# Patient Record
Sex: FEMALE | Race: WHITE | NOT HISPANIC OR LATINO | Employment: FULL TIME | URBAN - METROPOLITAN AREA
[De-identification: names, ages, dates, MRNs, and addresses within clinical notes are randomized per-mention and may not be internally consistent; named-entity substitution may affect disease eponyms.]

---

## 2017-05-16 DIAGNOSIS — R42 DIZZINESS: Primary | ICD-10-CM

## 2017-05-24 ENCOUNTER — OFFICE VISIT (OUTPATIENT)
Dept: NEUROLOGY | Facility: CLINIC | Age: 47
End: 2017-05-24
Payer: COMMERCIAL

## 2017-05-24 VITALS
DIASTOLIC BLOOD PRESSURE: 83 MMHG | HEIGHT: 65 IN | BODY MASS INDEX: 20.64 KG/M2 | SYSTOLIC BLOOD PRESSURE: 122 MMHG | WEIGHT: 123.88 LBS | HEART RATE: 75 BPM

## 2017-05-24 DIAGNOSIS — F41.1 GENERALIZED ANXIETY DISORDER: ICD-10-CM

## 2017-05-24 PROCEDURE — 1160F RVW MEDS BY RX/DR IN RCRD: CPT | Mod: S$GLB,,, | Performed by: PSYCHIATRY & NEUROLOGY

## 2017-05-24 PROCEDURE — 99999 PR PBB SHADOW E&M-EST. PATIENT-LVL III: CPT | Mod: PBBFAC,,, | Performed by: PSYCHIATRY & NEUROLOGY

## 2017-05-24 PROCEDURE — 99204 OFFICE O/P NEW MOD 45 MIN: CPT | Mod: S$GLB,,, | Performed by: PSYCHIATRY & NEUROLOGY

## 2017-05-24 RX ORDER — ALPRAZOLAM 0.25 MG/1
.125-.25 TABLET ORAL 2 TIMES DAILY PRN
COMMUNITY
End: 2018-07-03

## 2017-05-24 NOTE — PROGRESS NOTES
Subjective:       Patient ID: Gaby Álvarez is a 47 y.o. female.    Chief Complaint:  Dizziness      History of Present Illness  HPI    This is a 47-year-old female who was referred for evaluation of dizziness and vertigo.  The patient was accompanied by her .  She reports that her symptoms her started on April 25, 2017 when she noted sudden onset of a spinning sensation when turning in the bed.  The first few days she had significant positional vertigo with associated gait imbalance.  Symptoms appear to be more prominent when turning to the left or looking up.  She had seen a primary care physician and subsequently was started on meclizine which she did not tolerate.  She denies any prior history of similar symptoms but did report that about a year ago she had transient episode of dizziness that resolved on its own.  She was seen at the [facility] earlier this month and had audiological studies done.  She was noted to have left positional vertigo with asymmetrical hearing loss and was advised vestibular exercises and further vestibular testing however this was scheduled for the end of July.  Hence her referral here.    The patient had an MRI scan of the brain done recently that showed some nonspecific changes but was otherwise normal.  It is to be noted that the patient has had a history of migraine headaches in the past usually occurring around her periods.  She is presently in early menopause reporting that last period was 3 months ago.  She has not had any recent headaches.  She denies any recent sinus problems or viral illnesses.  She denies any history of head trauma.  The vertigo is not associated with any other focal neurological symptoms.  She has a history of chronic anxiety and had been on Xanax which she tried taking more recently for the symptoms however even a quarter of a tablet made her drowsy.  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  Her  was  present today did [place] an appointment to see him at the end of this month for the vestibular testing.       Review of Systems  Review of Systems   Constitutional: Negative.    HENT: Negative for hearing loss.    Eyes: Negative.  Negative for visual disturbance.   Respiratory: Negative.  Negative for shortness of breath.    Cardiovascular: Negative.  Negative for chest pain and palpitations.   Gastrointestinal: Negative.    Genitourinary: Negative.    Musculoskeletal: Negative.  Negative for back pain, gait problem and neck pain.   Skin: Negative.    Neurological: Positive for dizziness and headaches. Negative for tremors, seizures, syncope, speech difficulty, weakness and numbness.   Psychiatric/Behavioral: Negative.  Negative for confusion and decreased concentration.       Objective:      Neurologic Exam     Mental Status   Oriented to person, place, and time.   Registration: recalls 3 of 3 objects. Follows 3 step commands.   Attention: normal. Concentration: normal.   Speech: speech is normal   Level of consciousness: alert  Knowledge: good.   Able to name object. Able to read. Able to repeat. Normal comprehension.     Cranial Nerves   Cranial nerves II through XII intact.     Motor Exam   Muscle bulk: normal  Overall muscle tone: normal    Strength   Strength 5/5 throughout.     Sensory Exam   Light touch normal.   Proprioception normal.   Pinprick normal.     Gait, Coordination, and Reflexes     Gait  Gait: normal    Coordination   Romberg: negative  Finger to nose coordination: normal    Tremor   Resting tremor: absent  Intention tremor: absent  Action tremor: absent    Reflexes   Right brachioradialis: 1+  Left brachioradialis: 1+  Right biceps: 1+  Left biceps: 1+  Right triceps: 1+  Left triceps: 1+  Right patellar: 1+  Left patellar: 1+  Right achilles: 1+  Left achilles: 1+  Right plantar: normal  Left plantar: normal      Physical Exam    Constitutional: She is oriented to person, place, and time. She appears well-developed and well-nourished.   HENT:   Head: Normocephalic and atraumatic.   Neck: Normal range of motion. Neck supple. Carotid bruit is not present.   Neurological: She is oriented to person, place, and time. She has normal strength. She has a normal Finger-Nose-Finger Test and a normal Romberg Test. Gait normal.   Reflex Scores:       Tricep reflexes are 1+ on the right side and 1+ on the left side.       Bicep reflexes are 1+ on the right side and 1+ on the left side.       Brachioradialis reflexes are 1+ on the right side and 1+ on the left side.       Patellar reflexes are 1+ on the right side and 1+ on the left side.       Achilles reflexes are 1+ on the right side and 1+ on the left side.  Psychiatric: Her speech is normal.   Vitals reviewed.        Assessment:        1. Positional vertigo, unspecified laterality    2. Generalized anxiety disorder            Plan:         Discussed with patient and .  Advised him to keep her appointment with ENT later this month for an evaluation and vestibular testing.  Instead of the Xanax she would be given a prescription for Valium 2 mg, half tablet to be used twice a day as needed for the vertiginous symptoms.  However she has been advised to hold taking the Valium a day or 2 before she has the ENT testing.  She is advised that she may follow-up with me after the ENT evaluation is completed and the recommendations of treatment are initiated.  The possibility that some of her symptoms may be related to vertiginous migraine is to be kept in mind.  Magnesium oxide 250 mg daily at bedtime is initiated for migraine prophylaxis to see if this might help some of her vertiginous symptoms.  They will contact me after the ENT evaluation for follow-up in [place].

## 2017-05-24 NOTE — PATIENT INSTRUCTIONS
Discussed with patient and .  Advised him to keep her appointment with ENT later this month for an evaluation and vestibular testing.  Instead of the Xanax she would be given a prescription for Valium 2 mg, half tablet to be used twice a day as needed for the vertiginous symptoms.  However she has been advised to hold taking the Valium a day or 2 before she has the ENT testing.  She is advised that she may follow-up with me after the ENT evaluation is completed and the recommendations of treatment are initiated.  The possibility that some of her symptoms may be related to vertiginous migraine is to be kept in mind.  Magnesium oxide 250 mg daily at bedtime is initiated for migraine prophylaxis to see if this might help some of her vertiginous symptoms.  They will contact me after the ENT evaluation for follow-up in [place].

## 2017-05-25 RX ORDER — DIAZEPAM 2 MG/1
1-2 TABLET ORAL EVERY 12 HOURS PRN
Qty: 30 TABLET | Refills: 1 | Status: SHIPPED | OUTPATIENT
Start: 2017-05-25 | End: 2018-07-03

## 2017-07-05 DIAGNOSIS — Z12.31 SCREENING MAMMOGRAM, ENCOUNTER FOR: Primary | ICD-10-CM

## 2017-07-11 ENCOUNTER — HOSPITAL ENCOUNTER (OUTPATIENT)
Dept: RADIOLOGY | Facility: HOSPITAL | Age: 47
Discharge: HOME | End: 2017-07-11
Attending: OBSTETRICS & GYNECOLOGY
Payer: COMMERCIAL

## 2017-07-11 VITALS — HEIGHT: 65 IN | BODY MASS INDEX: 20.49 KG/M2 | WEIGHT: 123 LBS

## 2017-07-11 DIAGNOSIS — Z12.31 SCREENING MAMMOGRAM, ENCOUNTER FOR: ICD-10-CM

## 2017-07-11 PROCEDURE — 77067 SCR MAMMO BI INCL CAD: CPT | Mod: TC

## 2017-08-23 DIAGNOSIS — R42 DIZZINESS: Primary | ICD-10-CM

## 2018-07-03 ENCOUNTER — OFFICE VISIT (OUTPATIENT)
Dept: PSYCHIATRY | Facility: CLINIC | Age: 48
End: 2018-07-03
Payer: COMMERCIAL

## 2018-07-03 VITALS
HEIGHT: 65 IN | WEIGHT: 132.63 LBS | HEART RATE: 80 BPM | BODY MASS INDEX: 22.1 KG/M2 | DIASTOLIC BLOOD PRESSURE: 81 MMHG | SYSTOLIC BLOOD PRESSURE: 126 MMHG

## 2018-07-03 DIAGNOSIS — F43.23 ADJUSTMENT DISORDER WITH MIXED ANXIETY AND DEPRESSED MOOD: ICD-10-CM

## 2018-07-03 DIAGNOSIS — F41.1 GAD (GENERALIZED ANXIETY DISORDER): ICD-10-CM

## 2018-07-03 PROCEDURE — 90792 PSYCH DIAG EVAL W/MED SRVCS: CPT | Mod: S$GLB,,, | Performed by: PSYCHIATRY & NEUROLOGY

## 2018-07-03 PROCEDURE — 99999 PR PBB SHADOW E&M-EST. PATIENT-LVL III: CPT | Mod: PBBFAC,,, | Performed by: PSYCHIATRY & NEUROLOGY

## 2018-07-03 RX ORDER — BETAHISTINE HCL 100 %
POWDER (GRAM) MISCELLANEOUS
Refills: 5 | COMMUNITY
Start: 2018-06-19 | End: 2018-07-03

## 2018-07-03 RX ORDER — ESCITALOPRAM OXALATE 10 MG/1
TABLET ORAL
Qty: 1 TABLET | Refills: 0
Start: 2018-07-03 | End: 2018-07-20 | Stop reason: SDUPTHER

## 2018-07-03 RX ORDER — CLONAZEPAM 0.5 MG/1
0.25 TABLET ORAL NIGHTLY
COMMUNITY
Start: 2018-05-02 | End: 2018-09-04

## 2018-07-03 NOTE — PROGRESS NOTES
Outpatient Psychiatry Initial Visit (MD/NP)    7/3/2018    Gaby Álvarez, a 48 y.o. female, presenting for initial evaluation visit. Met with patient.    Reason for Encounter: self-referral. Patient complains of anxiety, depression.     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  I have not met patient before and do not feel this is a conflict of information.     Pt reports hx of a vestibular disorder, called superior canal dehiscence which is currently being evaluated.  She recently changed providers to a Neurotologist.in [place], Dr Zavaleta whom she saw today.  She is concerned because he does not file insurance and she will have to pay out of pocket for vestibular testing.  Pt reports symptoms of intense vertigo/dizziness which initially started 4/2017.  She reports symptoms were abrupt, unable to ambulate, was in bed for weeks.  She has been taking betahistine BID with some improvement, but has had some improvement in her balance, fullness in her ears.  She tried injections first but did not tolerate it and it caused high anxiety.  Pt reports her previous Neurologist Dr Rosa had recommended that she have a craniotomy which has caused her considerable stress.   Pt has been taking Clonazepam 0.25 mg nightly, but decided to stop it most of June and did not do well (very anxious, depressed). She is back to taking it 2-3 times a week. She did not experience any w/d symptoms.  Dr Zavaleta prescribed today Lexapro 10 mg - she believes to start taking 5 mg daily for unclear time duration (Rx in car, I tried to call patient to have her review it, but she could not understand it).     Pt reports this medical stressor has occurred along with problems with her 24 yo daughter (not getting along with her father), her son graduating from high school.   She  has taught x 23 years and she reports an incident occurred at school in the month of May which has caused her significant distress. She realized that during [employer] testing, there was something written on the wall that needed to be flipped over so the student would not have the information during the testing.   Pt did flip the info over within a few minutes, but was very bothered that she had done this so she admitted her wrong doing to her principal.  Pt reports she ended up being written up - the first time this has ever happened to her which has been very distressing for her.  Additionally, she reports there was a meeting at school with the student and other team members and she was so concerned they would talk about her, that she decided to record the conversation on her phone.  She realized later that nothing actually did record on her phone, but she has lived in intense fear that she may lose her job.  She also feels her coworkers are treating her differently, and she is upset that no one has asked her how she is doing.     Pt endorses the following:     MDE: depressed mood, anhedonia, hopelessness, feeling tired, worthlessness, + guilt.  poor focus. She has had decreased self care, excessive sleep.  Denies suicidal/homicidal ideations.  Appetite is ok.  PHQ-9: 12 (extremely difficult)  + isolative behavior. Pt is fearful about going back to school, especially team meeting in about 2 weeks.     IVONNE: Pt endorses feeling anxious, being unable to control anxiety, worrying excessively, trouble relaxing, irritability, restlessness. IVONNE-7: 12.  She denies symptoms of OCD    She denies hx of davina.  MDQ: 2 yes: irritability, easily distracted - minor problems.     Pt denies hx of violence, no HI.   She denies hx of auditory/visual hallucinations. Pt is paranoid that coworkers are plotting against her and talking about her. She does feel friends and coworkers are talking about her and are plotting together.    Past  Psychiatric History:  Prior diagnosis:  none  Inpatient psychiatric tx: none   Outpatient psychiatric tx:  None     Prior medications:   prescribed something x 2 days - Cymbalta or Wellbutrin - sick to stomach, did not take it PCP  Left work due to anxiety in  - mother ill. 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, congenital  Worst panic attack ever with DH Maneveur      Hx TBI: none   Hx seizures: none         Past Surgical History:  GALLBLADDER SURGERY[[id number]]       SECTION[[id number]                Family History:     Suicides:none   Substance abuse:  None   Bipolar Disorder:  None   Anxiety: father - anxieety   Depression: None       Social History:  Childhood: grew up in [place], 2 brother, middle child, 10 months older than younger brother, close to older brother, lost both parents 2010 mom, dad 11 months later   Marital status:  Children:   Resides: lives with  of 26 yrs, 22 yo daughter (), 18 yo - U, greatkkid, honor roll   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, Literacy   Hobbies: glittering shoes for [employer], used to exercise - vertigo limits this   Alevism: [place]   Education level:   : [employer] Bachelor's Science none   Legal:  None   Hx of abuse:  None       Substance History:  Tobacco: none   Alcohol: occasional, 1 glass wine occasional, not weekly  Drug use: none   Caffeine: decaf    Rehab:none   Prior/current AA: none       Review Of Systems:     GENERAL:  No weight gain or loss - lost 33 lbs  "a few yrs ago   SKIN:  No rashes or lacerations  HEAD:  No headaches  EYES:  No exophthalmos, jaundice or blindness  EARS:  occ dizziness, + tinnitus or hearing loss  NOSE:  No changes in smell  MOUTH & THROAT:  No dyskinetic movements or obvious goiter  CHEST:  No shortness of breath, hyperventilation or cough  CARDIOVASCULAR:  No tachycardia or chest pain  ABDOMEN:  No nausea, vomiting, pain, constipation or diarrhea, right sided abdominal pain RLQ  URINARY:  No frequency, dysuria or sexual dysfunction  ENDOCRINE:  No polydipsia, polyuria  MUSCULOSKELETAL:  No pain or stiffness of the joints  NEUROLOGIC:  No weakness, sensory changes, seizures,+ some confusion, + forgotful, forgot bill few months ago, memory loss, tremor or other abnormal movements    Current Evaluation:     Nutritional Screening: Considering the patient's height and weight, medications, medical history and preferences, should a referral be made to the dietitian? no    Constitutional  Vitals:  Most recent vital signs, dated less than 90 days prior to this appointment, were not reviewed (not available)  Vitals:    07/03/18 1445   BP: 126/81   Pulse: 80   Weight: 60.2 kg (132 lb 9.7 oz)   Height: 5' 5" (1.651 m)        General:  age appropriate, normal weight, well nourished, well dressed, neatly groomed     Musculoskeletal  Muscle Strength/Tone:  no tremor   Gait & Station:  non-ataxic     Psychiatric  Speech:  no latency; no press   Mood & Affect:  anxious  mood-congruent   Thought Process:  racing   Associations:  intact, circumstantial   Thought Content:  normal, no suicidality, no homicidality, delusions, or paranoia, hallucinations: (auditory: no, visual: no), paranoid   Insight:  fair   Judgement: fair    Orientation:  grossly intact, person, place, situation, time/date, day of week, month of year, year   Memory: intact for content of interview, memory >3 objects at five mins, able to remember recent events- no, able to remember remote " events- yes   Language: grossly intact   Attention Span & Concentration:  able to focus   Fund of Knowledge:  intact and appropriate to age and level of education, familiar with aspects of current personal life       Relevant Elements of Neurological Exam: normal gait    Functioning in Relationships:  Spouse/partner: supportive, encouraged her to get help  Peers: limited   Employers: + conflcits     Laboratory Data  No visits with results within 1 Month(s) from this visit.   Latest known visit with results is:   No results found for any previous visit.         Medications  Outpatient Encounter Prescriptions as of 7/3/2018   Medication Sig Dispense Refill    betahistine HCl (BETAHISTINE, BULK, MISC) 1 capsule by Misc.(Non-Drug; Combo Route) route 2 (two) times daily. Its a compounded drug made into capsule 12 mg      clonazePAM (KLONOPIN) 0.5 MG tablet Take 0.25 mg by mouth every evening. patietn only taking half pill trying to wean off      [DISCONTINUED] alprazolam (XANAX) 0.25 MG tablet Take 0.125-0.25 mg by mouth 2 (two) times daily as needed for Anxiety.      [DISCONTINUED] betahistine HCl (BETAHISTINE, BULK,) 100 % Powd   5    [DISCONTINUED] diazePAM (VALIUM) 2 MG tablet Take 0.5-1 tablets (1-2 mg total) by mouth every 12 (twelve) hours as needed for Anxiety (And dizziness). 30 tablet 1     No facility-administered encounter medications on file as of 7/3/2018.            Assessment - Diagnosis - Goals:     Impression: pt presents for intake following high anxiety/stress related to health and occupational stressors. Pt's symptoms are bordering on delusional.  Family has been very concerned and her sister in law reached out to schedule this appt for her.     Adjustment Disorder with mixed anxiety and depressed mood  Generalized Anxiety Disorder  Superior Canal Dehiscence     GAF: 52     Strengths and Liabilities: Strength: Patient accepts guidance/feedback, Strength: Patient is expressive/articulate.,  Strength: Patient is intelligent.    Treatment Goals:  Specify outcomes written in observable, behavioral terms:   Anxiety: acquiring relapse prevention skills, reducing negative automatic thoughts, reducing physical symptoms of anxiety and reducing time spent worrying (<30 minutes/day)  Depression: acquiring relapse prevention skills, increasing energy, increasing interest in usual activities and increasing motivation    Treatment Plan/Recommendations:   · Medication Management: The risks and benefits of medication were discussed with the patient.    - agree with trial of Lexapro 10 mg with instructions of: Take one-half tablet by mouth daily for 7 days, then increase to one tablet by mouth daily  - agree with continued use of Clonazepam 0.25 mg nightly PRN anxiety. Discussed risk of decreased RT, sedation, addictive potential, and not to mix with alcohol.   - may need to augment with atypical antipsychotic, but will start first with SSRI and monitor for improvement in thinking, decrease in paranoia  - Pt instructed to go to ER with thoughts of harming self, others   - Call to report any worsening of symptoms or problems with the medication    Return to Clinic: 2-3 weeks     Counseling time: 40 mins  Total time: 70 mins

## 2018-07-20 ENCOUNTER — OFFICE VISIT (OUTPATIENT)
Dept: PSYCHIATRY | Facility: CLINIC | Age: 48
End: 2018-07-20
Payer: COMMERCIAL

## 2018-07-20 VITALS
HEIGHT: 65 IN | DIASTOLIC BLOOD PRESSURE: 79 MMHG | BODY MASS INDEX: 21.89 KG/M2 | WEIGHT: 131.38 LBS | SYSTOLIC BLOOD PRESSURE: 113 MMHG | HEART RATE: 73 BPM

## 2018-07-20 DIAGNOSIS — F43.23 ADJUSTMENT DISORDER WITH MIXED ANXIETY AND DEPRESSED MOOD: ICD-10-CM

## 2018-07-20 DIAGNOSIS — F41.1 GAD (GENERALIZED ANXIETY DISORDER): ICD-10-CM

## 2018-07-20 PROCEDURE — 99214 OFFICE O/P EST MOD 30 MIN: CPT | Mod: S$GLB,,, | Performed by: PSYCHIATRY & NEUROLOGY

## 2018-07-20 PROCEDURE — 99999 PR PBB SHADOW E&M-EST. PATIENT-LVL III: CPT | Mod: PBBFAC,,, | Performed by: PSYCHIATRY & NEUROLOGY

## 2018-07-20 PROCEDURE — 3008F BODY MASS INDEX DOCD: CPT | Mod: CPTII,S$GLB,, | Performed by: PSYCHIATRY & NEUROLOGY

## 2018-07-20 RX ORDER — ESCITALOPRAM OXALATE 10 MG/1
TABLET ORAL
Qty: 30 TABLET | Refills: 2 | Status: SHIPPED | OUTPATIENT
Start: 2018-07-20 | End: 2018-08-20 | Stop reason: SDUPTHER

## 2018-07-20 NOTE — PROGRESS NOTES
Outpatient Psychiatry Follow Up Visit (MD/NP)    7/20/2018    Gaby Álvarez, a 48 y.o. female, presenting for initial evaluation visit. Met with patient.    Reason for Encounter: self-referral. Patient complains of anxiety, depression.     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  I have not met patient before and do not feel this is a conflict of information.     Pt reports hx of a vestibular disorder, called superior canal dehiscence which is currently being evaluated.  She recently changed providers to a Neurotologist.in [place], Dr Zavaleta whom she saw today.  She is concerned because he does not file insurance and she will have to pay out of pocket for vestibular testing.  Pt reports symptoms of intense vertigo/dizziness which initially started 4/2017.  She reports symptoms were abrupt, unable to ambulate, was in bed for weeks.  She has been taking betahistine BID with some improvement, but has had some improvement in her balance, fullness in her ears.  She tried injections first but did not tolerate it and it caused high anxiety.  Pt reports her previous Neurologist Dr Rosa had recommended that she have a craniotomy which has caused her considerable stress.   Pt has been taking Clonazepam 0.25 mg nightly, but decided to stop it most of June and did not do well (very anxious, depressed). She is back to taking it 2-3 times a week. She did not experience any w/d symptoms.  Dr Zavaleta prescribed today Lexapro 10 mg - she believes to start taking 5 mg daily for unclear time duration (Rx in car, I tried to call patient to have her review it, but she could not understand it).     Pt reports this medical stressor has occurred along with problems with her 22 yo daughter (not getting along with her father), her son graduating from high school.    She has taught x 23 years and she reports an incident occurred at school in the month of May which has caused her significant distress. She realized that during [employer] testing, there was something written on the wall that needed to be flipped over so the student would not have the information during the testing.   Pt did flip the info over within a few minutes, but was very bothered that she had done this so she admitted her wrong doing to her principal.  Pt reports she ended up being written up - the first time this has ever happened to her which has been very distressing for her.  Additionally, she reports there was a meeting at school with the student and other team members and she was so concerned they would talk about her, that she decided to record the conversation on her phone.  She realized later that nothing actually did record on her phone, but she has lived in intense fear that she may lose her job.  She also feels her coworkers are treating her differently, and she is upset that no one has asked her how she is doing.     Pt endorses the following:     MDE: depressed mood, anhedonia, hopelessness, feeling tired, worthlessness, + guilt.  poor focus. She has had decreased self care, excessive sleep.  Denies suicidal/homicidal ideations.  Appetite is ok.  PHQ-9: 12 (extremely difficult)  + isolative behavior. Pt is fearful about going back to school, especially team meeting in about 2 weeks.     IVONNE: Pt endorses feeling anxious, being unable to control anxiety, worrying excessively, trouble relaxing, irritability, restlessness. IVONNE-7: 12.  She denies symptoms of OCD    She denies hx of davina.  MDQ: 2 yes: irritability, easily distracted - minor problems.     Pt denies hx of violence, no HI.   She denies hx of auditory/visual hallucinations. Pt is paranoid that coworkers are plotting against her and talking about her. She does feel friends and coworkers are talking about her and are plotting together.    Past  Psychiatric History:  Prior diagnosis:  none  Inpatient psychiatric tx: none   Outpatient psychiatric tx:  None     Prior medications:   prescribed something x 2 days - Cymbalta or Wellbutrin - sick to stomach, did not take it PCP  Left work due to anxiety in  - mother ill. 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, congenital  Worst panic attack ever with DH Maneveur      Hx TBI: none   Hx seizures: none         Past Surgical History:  GALLBLADDER SURGERY[[id number]]       SECTION[[id number]                Family History:     Suicides:none   Substance abuse:  None   Bipolar Disorder:  None   Anxiety: father - anxieety   Depression: None       Social History:  Childhood: grew up in [place], 2 brother, middle child, 10 months older than younger brother, close to older brother, lost both parents 2010 mom, dad 11 months later   Marital status:  Children:   Resides: lives with  of 26 yrs, 24 yo daughter (), 16 yo - [employer], greatkkid, honor roll   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  She has taken  a half of Clonazepam a few times in interim, including the night she returned for a school meeting for the leadership team.  She is in charge of hospitality.  Pt reports her coworkers were still treating her differently, and she suspects they were texting each other about her.  She is still upset that no one asked if she was ok.  Noticed people were smirking at her.  She still worries about possibly losing her job.  Her principal did send her a card in the mail with a positive message, but the patient suspects it was a warning for her that she may lose her job.   She does notice some improvement.  She is going out of the home again, going to stores,  has noted some improvement.   She denies problems with sleep, appetite.  She anticipates increased anxiety with approach of school year.   Denies suicidal/homicidal ideations.  No full blown panic attacks.  She is worrying a little less, but still worries about everything.     Pt does not plan to return to the doctor she saw for second opinion for her vestibular condition.  She will resume care with Dr Rosa.   Does not have an appt scheduled at this time.   Denies symptoms of davina.  Denies auditory/visual hallucinations    Review Of Systems:     GENERAL:  No weight gain or loss - lost 33 lbs a few yrs ago   SKIN:  No rashes or lacerations  MOUTH & THROAT:  No dyskinetic movements or obvious goiter  CHEST:  No shortness of breath, hyperventilation or cough  CARDIOVASCULAR:  No tachycardia or chest pain  MUSCULOSKELETAL:  No pain or stiffness of the joints  NEUROLOGIC:  + 2 episodes of dizziness   Current Evaluation:     Nutritional Screening: Considering the patient's height and weight, medications, medical history and preferences, should a referral be made to the dietitian? no    Constitutional  Vitals:  Most recent vital signs, dated less than 90 days prior to this appointment, were not reviewed (not available)  Vitals:    07/20/18 0856   BP: 113/79   Pulse:  "73   Weight: 59.6 kg (131 lb 6.3 oz)   Height: 5' 5" (1.651 m)        General:  age appropriate, normal weight, well nourished, well dressed, neatly groomed     Musculoskeletal  Muscle Strength/Tone:  no tremor   Gait & Station:  non-ataxic     Psychiatric  Speech:  no latency; no press   Mood & Affect:  anxious  mood-congruent   Thought Process:  racing   Associations:  intact, circumstantial   Thought Content:  normal, no suicidality, no homicidality, delusions, or paranoia, hallucinations: (auditory: no, visual: no), paranoid   Insight:  fair   Judgement: fair    Orientation:  grossly intact, person, place, situation, time/date, day of week, month of year, year   Memory: intact for content of interview, memory >3 objects at five mins, able to remember recent events- no, able to remember remote events- yes   Language: grossly intact   Attention Span & Concentration:  able to focus   Fund of Knowledge:  intact and appropriate to age and level of education, familiar with aspects of current personal life       Relevant Elements of Neurological Exam: normal gait    Functioning in Relationships:  Spouse/partner: supportive, encouraged her to get help  Peers: limited   Employers: + conflcits     Laboratory Data  No visits with results within 1 Month(s) from this visit.   Latest known visit with results is:   No results found for any previous visit.         Medications  Outpatient Encounter Prescriptions as of 7/20/2018   Medication Sig Dispense Refill    betahistine HCl (BETAHISTINE, BULK, MISC) 1 capsule by Misc.(Non-Drug; Combo Route) route 2 (two) times daily. Its a compounded drug made into capsule 12 mg      escitalopram oxalate (LEXAPRO) 10 MG tablet Take one-half tablet by mouth daily for 7 days, then increase to one tablet by mouth daily 1 tablet 0    clonazePAM (KLONOPIN) 0.5 MG tablet Take 0.25 mg by mouth every evening. patietn only taking half pill trying to wean off       No facility-administered " encounter medications on file as of 7/20/2018.            Assessment - Diagnosis - Goals:     Impression: pt presents for intake following high anxiety/stress related to health and occupational stressors. Pt's symptoms are bordering on delusional.  Family has been very concerned and her sister in law reached out to schedule this appt for her.     Adjustment Disorder with mixed anxiety and depressed mood R/O Delusional Disorder   Generalized Anxiety Disorder  Superior Canal Dehiscence     GAF: 53     Strengths and Liabilities: Strength: Patient accepts guidance/feedback, Strength: Patient is expressive/articulate., Strength: Patient is intelligent.    Treatment Goals:  Specify outcomes written in observable, behavioral terms:   Anxiety: acquiring relapse prevention skills, reducing negative automatic thoughts, reducing physical symptoms of anxiety and reducing time spent worrying (<30 minutes/day)  Depression: acquiring relapse prevention skills, increasing energy, increasing interest in usual activities and increasing motivation    Treatment Plan/Recommendations:   · Medication Management: The risks and benefits of medication were discussed with the patient.    - Continue Lexapro 10 mg daily   - agree with continued use of Clonazepam 0.25 mg nightly PRN anxiety. Discussed risk of decreased RT, sedation, addictive potential, and not to mix with alcohol.   - may need to augment with atypical antipsychotic, but will start first with SSRI and monitor for improvement in thinking, decrease in paranoia  - Pt instructed to go to ER with thoughts of harming self, others   - Call to report any worsening of symptoms or problems with the medication    Return to Clinic: 4 weeks

## 2018-08-20 ENCOUNTER — OFFICE VISIT (OUTPATIENT)
Dept: PSYCHIATRY | Facility: CLINIC | Age: 48
End: 2018-08-20
Payer: COMMERCIAL

## 2018-08-20 VITALS
HEIGHT: 65 IN | BODY MASS INDEX: 22.56 KG/M2 | DIASTOLIC BLOOD PRESSURE: 81 MMHG | SYSTOLIC BLOOD PRESSURE: 118 MMHG | HEART RATE: 79 BPM | WEIGHT: 135.38 LBS

## 2018-08-20 DIAGNOSIS — F33.3 MDD (MAJOR DEPRESSIVE DISORDER), RECURRENT, SEVERE, WITH PSYCHOSIS: ICD-10-CM

## 2018-08-20 DIAGNOSIS — F41.1 GAD (GENERALIZED ANXIETY DISORDER): ICD-10-CM

## 2018-08-20 PROCEDURE — 99999 PR PBB SHADOW E&M-EST. PATIENT-LVL III: CPT | Mod: PBBFAC,,, | Performed by: PSYCHIATRY & NEUROLOGY

## 2018-08-20 PROCEDURE — 3008F BODY MASS INDEX DOCD: CPT | Mod: CPTII,S$GLB,, | Performed by: PSYCHIATRY & NEUROLOGY

## 2018-08-20 PROCEDURE — 99214 OFFICE O/P EST MOD 30 MIN: CPT | Mod: S$GLB,,, | Performed by: PSYCHIATRY & NEUROLOGY

## 2018-08-20 RX ORDER — ESCITALOPRAM OXALATE 10 MG/1
TABLET ORAL
Qty: 30 TABLET | Refills: 2 | Status: SHIPPED | OUTPATIENT
Start: 2018-08-20 | End: 2018-09-04

## 2018-08-20 RX ORDER — ARIPIPRAZOLE 2 MG/1
2 TABLET ORAL DAILY
Qty: 30 TABLET | Refills: 0 | Status: SHIPPED | OUTPATIENT
Start: 2018-08-20 | End: 2018-09-04 | Stop reason: SDUPTHER

## 2018-09-04 ENCOUNTER — OFFICE VISIT (OUTPATIENT)
Dept: PSYCHIATRY | Facility: CLINIC | Age: 48
End: 2018-09-04
Payer: COMMERCIAL

## 2018-09-04 VITALS
HEART RATE: 75 BPM | WEIGHT: 140.13 LBS | HEIGHT: 65 IN | SYSTOLIC BLOOD PRESSURE: 112 MMHG | BODY MASS INDEX: 23.35 KG/M2 | DIASTOLIC BLOOD PRESSURE: 77 MMHG

## 2018-09-04 DIAGNOSIS — F33.3 MDD (MAJOR DEPRESSIVE DISORDER), RECURRENT, SEVERE, WITH PSYCHOSIS: ICD-10-CM

## 2018-09-04 DIAGNOSIS — F41.1 GAD (GENERALIZED ANXIETY DISORDER): ICD-10-CM

## 2018-09-04 PROCEDURE — 99214 OFFICE O/P EST MOD 30 MIN: CPT | Mod: S$GLB,,, | Performed by: PSYCHIATRY & NEUROLOGY

## 2018-09-04 PROCEDURE — 3008F BODY MASS INDEX DOCD: CPT | Mod: CPTII,S$GLB,, | Performed by: PSYCHIATRY & NEUROLOGY

## 2018-09-04 PROCEDURE — 99999 PR PBB SHADOW E&M-EST. PATIENT-LVL III: CPT | Mod: PBBFAC,,, | Performed by: PSYCHIATRY & NEUROLOGY

## 2018-09-04 RX ORDER — ESCITALOPRAM OXALATE 10 MG/1
TABLET ORAL
Qty: 1 TABLET | Refills: 0
Start: 2018-09-04 | End: 2018-10-04 | Stop reason: DRUGHIGH

## 2018-09-04 RX ORDER — ARIPIPRAZOLE 2 MG/1
2 TABLET ORAL DAILY
Qty: 30 TABLET | Refills: 1 | Status: SHIPPED | OUTPATIENT
Start: 2018-09-04 | End: 2018-10-04 | Stop reason: SDUPTHER

## 2018-09-04 RX ORDER — ESCITALOPRAM OXALATE 20 MG/1
20 TABLET ORAL DAILY
Qty: 30 TABLET | Refills: 1 | Status: SHIPPED | OUTPATIENT
Start: 2018-09-04 | End: 2018-10-04 | Stop reason: SDUPTHER

## 2018-09-04 NOTE — PROGRESS NOTES
Outpatient Psychiatry Follow Up Visit (MD/NP)    9/4/2018    Gaby Álvarez, a 48 y.o. female, presenting for follow up visit. Met with patient alone and with her .      Reason for Encounter: self-referral. Patient complains of anxiety, depression.     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  I have not met patient before and do not feel this is a conflict of information.     Pt reports hx of a vestibular disorder, called superior canal dehiscence which is currently being evaluated.  She recently changed providers to a Neurotologist.in [place], Dr Zavaleta whom she saw today.  She is concerned because he does not file insurance and she will have to pay out of pocket for vestibular testing.  Pt reports symptoms of intense vertigo/dizziness which initially started 4/2017.  She reports symptoms were abrupt, unable to ambulate, was in bed for weeks.  She has been taking betahistine BID with some improvement, but has had some improvement in her balance, fullness in her ears.  She tried injections first but did not tolerate it and it caused high anxiety.  Pt reports her previous Neurologist Dr Rosa had recommended that she have a craniotomy which has caused her considerable stress.   Pt has been taking Clonazepam 0.25 mg nightly, but decided to stop it most of June and did not do well (very anxious, depressed). She is back to taking it 2-3 times a week. She did not experience any w/d symptoms.  Dr Zavaleta prescribed today Lexapro 10 mg - she believes to start taking 5 mg daily for unclear time duration (Rx in car, I tried to call patient to have her review it, but she could not understand it).     Pt reports this medical stressor has occurred along with problems with her 24 yo daughter (not getting along with her father), her son graduating  from high school.   She has taught x 23 years and she reports an incident occurred at school in the month of May which has caused her significant distress. She realized that during LEAP testing, there was something written on the wall that needed to be flipped over so the student would not have the information during the testing.   Pt did flip the info over within a few minutes, but was very bothered that she had done this so she admitted her wrong doing to her principal.  Pt reports she ended up being written up - the first time this has ever happened to her which has been very distressing for her.  Additionally, she reports there was a meeting at school with the student and other team members and she was so concerned they would talk about her, that she decided to record the conversation on her phone.  She realized later that nothing actually did record on her phone, but she has lived in intense fear that she may lose her job.  She also feels her coworkers are treating her differently, and she is upset that no one has asked her how she is doing.     Pt endorses the following:     MDE: depressed mood, anhedonia, hopelessness, feeling tired, worthlessness, + guilt.  poor focus. She has had decreased self care, excessive sleep.  Denies suicidal/homicidal ideations.  Appetite is ok.  PHQ-9: 12 (extremely difficult)  + isolative behavior. Pt is fearful about going back to school, especially team meeting in about 2 weeks.     IVONNE: Pt endorses feeling anxious, being unable to control anxiety, worrying excessively, trouble relaxing, irritability, restlessness. IVONNE-7: 12.  She denies symptoms of OCD    She denies hx of davina.  MDQ: 2 yes: irritability, easily distracted - minor problems.     Pt denies hx of violence, no HI.   She denies hx of auditory/visual hallucinations. Pt is paranoid that coworkers are plotting against her and talking about her. She does feel friends and coworkers are talking about her and are plotting  together.    Past Psychiatric History:  Prior diagnosis:  none  Inpatient psychiatric tx: none   Outpatient psychiatric tx:  None     Prior medications:   prescribed something x 2 days - Cymbalta or Wellbutrin - sick to stomach, did not take it PCP  Left work due to anxiety in  - mother ill. 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, congenital  Worst panic attack ever with DH Maneveur      Hx TBI: none   Hx seizures: none         Past Surgical History:  GALLBLADDER SURGERY[[id number]]       SECTION[[id number]                Family History:     Suicides:none   Substance abuse:  None   Bipolar Disorder:  None   Anxiety: father - anxieety   Depression: None       Social History:  Childhood: grew up in [place], 2 brother, middle child, 10 months older than younger brother, close to older brother, lost both parents 2010 mom, dad 11 months later   Marital status:  Children:   Resides: lives with  of 26 yrs, 24 yo daughter (), 18 yo - [employer], greatkkid, honor roll   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 Abilify 2 mg daily was added last visit.  She has not taken Clonazepam.   School is back in session.  Anxiety has been very high. She thinks she needs a leave of absence.  She took off several days recently and felt better away from stress of school.  She continues to think that teachers and principal at school are shunning her, but pt feels she is less focused on this.  Anxiety makes symptoms of superior canal dehiscence worse and she has not had time to address this med issue.  She is considering paying out of pocket and seeing SCD specialist, Dr Zavaleta. She is fearful to have work up done as it triggered significant anxiety in past.  Betahistine TID does help with her symptoms.   Pt is anxious more than depressed.   When anxiety rises, she gets more neuro symptoms.  Ears are full.  No vertigo, but off balance.  She thinks a lot of her symptoms are related to SCD.  She has poor focus, poor memory - ST memory issues.   She does feel like she needs time off.  She wants to get better and be herself again.    Dr Laughlin does not take insurance.  He will perform a 4 hour test to determine if she needs surgery - Middle fossa craniotomy. No falls in interim.  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   She does not feel ready to have surgery.   She has been worrying about all the "crap" at school.   Anxiety is through the roof.  Coworkers do not respect her.   Se feels like the longer she is way from work, she feels better. + excessive worry, but she feels she can control it.   Does not feel she can go back to work.     PSYCH ROS:   Does not feel as sad as she used to feel this past summer.  Still not motivated.  Sleeps a lot. Appetite is fine, + wt gain. 5 lbs.  No hopelessness.  "I have lost my confidence, knocked off my horse."  Denies suicidal/homicidal ideations.  No panic attacks.  Tense, worried, on edge, no racing thoughts, mld irritability with daughter.   She has let go of thoughts she has " "done something wrong. Feels isolated at work.  Admin does not come to her for advice, she does not eat lunch with friends.   Paranoia is reduced, not looking at social media anymore.    Denies auditory/visual hallucinations  Not crying as much.      Depression: 3-4/10   Anxiety: 6-7/10     Rare wine. No drug use.    Per  [place]: pt is still paranoia and anxious.  There has been a slight improvement on Abilify. Constantly worried about what people think of her. Anxiety is very high.    Clonazepam - stopped it, it was making her feel more down.      Pt reports Dr Rosa, Neurology had recommended she see a mental health provider who specialized in tx patients with SCD.  She does not know name but will let me know.     Review Of Systems:     GENERAL:  + weight gain   SKIN:  No rashes or lacerations  MOUTH & THROAT:  No dyskinetic movements or obvious goiter  CHEST:  No shortness of breath, hyperventilation or cough  CARDIOVASCULAR:  No tachycardia or chest pain  MUSCULOSKELETAL:  No pain or stiffness of the joints  NEUROLOGIC:  Fullness, off balance.   Current Evaluation:     Nutritional Screening: Considering the patient's height and weight, medications, medical history and preferences, should a referral be made to the dietitian? no    Constitutional  Vitals:  Most recent vital signs, dated less than 90 days prior to this appointment, were not reviewed (not available)  Vitals:    09/04/18 0819   BP: 112/77   Pulse: 75   Weight: 63.6 kg (140 lb 1.6 oz)   Height: 5' 5" (1.651 m)        General:  age appropriate, normal weight, well nourished, well dressed, neatly groomed     Musculoskeletal  Muscle Strength/Tone:  no tremor   Gait & Station:  non-ataxic     Psychiatric  Speech:  no latency; no press   Mood & Affect:  anxious  mood-congruent, less anxious appearing    Thought Process:  illogical at times. Can answer questions in linear fashion   Associations:  No RAMAKRISHNA    Thought Content:  no suicidality, no " homicidality, delusions, hallucinations: (auditory: no, visual: no), + paranoid   Insight:  fair   Judgement: fair    Orientation:  grossly intact, person, place, situation, time/date, day of week, month of year, year   Memory: intact for content of interview, memory >3 objects at five mins, able to remember recent events- no, able to remember remote events- yes   Language: grossly intact   Attention Span & Concentration:  able to focus   Fund of Knowledge:  intact and appropriate to age and level of education, familiar with aspects of current personal life       Relevant Elements of Neurological Exam: normal gait    Functioning in Relationships:  Spouse/partner: supportive, encouraged her to get help  Peers: limited   Employers: + conflcits     Laboratory Data  No visits with results within 1 Month(s) from this visit.   Latest known visit with results is:   No results found for any previous visit.         Medications  Outpatient Encounter Medications as of 9/4/2018   Medication Sig Dispense Refill    ARIPiprazole (ABILIFY) 2 MG Tab Take 1 tablet (2 mg total) by mouth once daily. 30 tablet 0    betahistine HCl (BETAHISTINE, BULK, MISC) 1 capsule by Misc.(Non-Drug; Combo Route) route 2 (two) times daily. Its a compounded drug made into capsule 12 mg      escitalopram oxalate (LEXAPRO) 10 MG tablet Take one tablet by mouth daily 30 tablet 2    [DISCONTINUED] clonazePAM (KLONOPIN) 0.5 MG tablet Take 0.25 mg by mouth every evening. patietn only taking half pill trying to wean off       No facility-administered encounter medications on file as of 9/4/2018.            Assessment - Diagnosis - Goals:     Impression: pt presents for intake following high anxiety/stress related to health and occupational stressors. Pt's symptoms are bordering on delusional.  Family has been very concerned and her sister in law reached out to schedule this appt for her.  Pt with limited progress with addition of Lexapro. Addition of  Abilify last visits showing small benefit.  Anxiety remains high.     MDD, single episode, severe with paranoid ideations   R/O Delusional Disorder   Generalized Anxiety Disorder  Superior Canal Dehiscence     GAF: 53    Strengths and Liabilities: Strength: Patient accepts guidance/feedback, Strength: Patient is expressive/articulate., Strength: Patient is intelligent.    Treatment Goals:  Specify outcomes written in observable, behavioral terms:   Anxiety: acquiring relapse prevention skills, reducing negative automatic thoughts, reducing physical symptoms of anxiety and reducing time spent worrying (<30 minutes/day)  Depression: acquiring relapse prevention skills, increasing energy, increasing interest in usual activities and increasing motivation    Treatment Plan/Recommendations:   · Medication Management: The risks and benefits of medication were discussed with the patient.    - titrate Lexapro to 15 mg daily until prescription is completed, then 20 mg daily to target anxiety.   - D/C Clonazepam - pt feels this med makes her feel worse  - Continue Abilify 2 mg daily for paranoia, augment treatment of mood symptoms.  Typical TENISHA's reviewed including weight gain, abnormal movements, EPS, TD, metabolic side effects.  Consider titration next visit  - Pt instructed to go to ER with thoughts of harming self, others   - Call to report any worsening of symptoms or problems with the medication  - discussed referral to psychotherapy - she will contact provider to Dr Rosa recommended   - Will proceed with medical leave of absence effective immediately - to be reassessed in one month.  Pt's symptoms are severe enough to limit her ability to perform her job and stressors at work are triggering an exacerbation of her symptoms. Letter provided to pt today.     Return to Clinic: 4 weeks

## 2018-09-06 ENCOUNTER — PATIENT MESSAGE (OUTPATIENT)
Dept: PSYCHIATRY | Facility: CLINIC | Age: 48
End: 2018-09-06

## 2018-09-07 ENCOUNTER — PATIENT MESSAGE (OUTPATIENT)
Dept: PSYCHIATRY | Facility: CLINIC | Age: 48
End: 2018-09-07

## 2018-09-07 DIAGNOSIS — Z12.31 SCREENING MAMMOGRAM, ENCOUNTER FOR: Primary | ICD-10-CM

## 2018-09-10 ENCOUNTER — TELEPHONE (OUTPATIENT)
Dept: PSYCHIATRY | Facility: CLINIC | Age: 48
End: 2018-09-10

## 2018-09-10 NOTE — TELEPHONE ENCOUNTER
----- Message from Stephanie Mcpherson MD sent at 9/8/2018  6:51 PM CDT -----  Please forward pt letter dated 9/4/18 - alert via My Chart, thank you

## 2018-09-11 ENCOUNTER — PATIENT MESSAGE (OUTPATIENT)
Dept: PSYCHIATRY | Facility: CLINIC | Age: 48
End: 2018-09-11

## 2018-09-11 NOTE — TELEPHONE ENCOUNTER
Spoke to patient and explained that the paperwork was received and that we do have it and received it on Friday after Dr Mcpherson was out  Of clinic and explained she was out due to a family emergency and she will be back in clinic tomorrow and will call her when paperwork is completed.  Patient understood

## 2018-09-14 ENCOUNTER — HOSPITAL ENCOUNTER (OUTPATIENT)
Dept: RADIOLOGY | Facility: HOSPITAL | Age: 48
Discharge: HOME | End: 2018-09-14
Attending: OBSTETRICS & GYNECOLOGY
Payer: COMMERCIAL

## 2018-09-14 DIAGNOSIS — Z12.31 SCREENING MAMMOGRAM, ENCOUNTER FOR: ICD-10-CM

## 2018-09-14 PROCEDURE — 77063 BREAST TOMOSYNTHESIS BI: CPT | Mod: TC,PO

## 2018-10-04 ENCOUNTER — OFFICE VISIT (OUTPATIENT)
Dept: PSYCHIATRY | Facility: CLINIC | Age: 48
End: 2018-10-04
Payer: COMMERCIAL

## 2018-10-04 VITALS
HEART RATE: 65 BPM | BODY MASS INDEX: 23.66 KG/M2 | SYSTOLIC BLOOD PRESSURE: 107 MMHG | WEIGHT: 142 LBS | HEIGHT: 65 IN | DIASTOLIC BLOOD PRESSURE: 75 MMHG

## 2018-10-04 DIAGNOSIS — F41.1 GAD (GENERALIZED ANXIETY DISORDER): ICD-10-CM

## 2018-10-04 DIAGNOSIS — F33.3 MDD (MAJOR DEPRESSIVE DISORDER), RECURRENT, SEVERE, WITH PSYCHOSIS: ICD-10-CM

## 2018-10-04 PROCEDURE — 90833 PSYTX W PT W E/M 30 MIN: CPT | Mod: S$GLB,,, | Performed by: PSYCHIATRY & NEUROLOGY

## 2018-10-04 PROCEDURE — 99999 PR PBB SHADOW E&M-EST. PATIENT-LVL III: CPT | Mod: PBBFAC,,, | Performed by: PSYCHIATRY & NEUROLOGY

## 2018-10-04 PROCEDURE — 3008F BODY MASS INDEX DOCD: CPT | Mod: CPTII,S$GLB,, | Performed by: PSYCHIATRY & NEUROLOGY

## 2018-10-04 PROCEDURE — 99213 OFFICE O/P EST LOW 20 MIN: CPT | Mod: S$GLB,,, | Performed by: PSYCHIATRY & NEUROLOGY

## 2018-10-04 RX ORDER — ALPRAZOLAM 0.25 MG/1
0.25 TABLET ORAL DAILY PRN
Qty: 20 TABLET | Refills: 0 | Status: SHIPPED | OUTPATIENT
Start: 2018-10-04 | End: 2019-11-26

## 2018-10-04 RX ORDER — ESCITALOPRAM OXALATE 20 MG/1
20 TABLET ORAL DAILY
Qty: 90 TABLET | Refills: 0 | Status: SHIPPED | OUTPATIENT
Start: 2018-10-04 | End: 2018-11-19 | Stop reason: SDUPTHER

## 2018-10-04 RX ORDER — ARIPIPRAZOLE 2 MG/1
2 TABLET ORAL DAILY
Qty: 90 TABLET | Refills: 0 | Status: SHIPPED | OUTPATIENT
Start: 2018-10-04 | End: 2018-11-19

## 2018-10-04 NOTE — PROGRESS NOTES
Outpatient Psychiatry Follow Up Visit (MD/NP)    10/4/2018    Gaby Álvarez, a 48 y.o. female, presenting for follow up visit. Met with patient alone and with her .      Reason for Encounter: self-referral. Patient complains of anxiety, depression.     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  I have not met patient before and do not feel this is a conflict of information.     Pt reports hx of a vestibular disorder, called superior canal dehiscence which is currently being evaluated.  She recently changed providers to a Neurotologist.in [place], Dr Zavaleta whom she saw today.  She is concerned because he does not file insurance and she will have to pay out of pocket for vestibular testing.  Pt reports symptoms of intense vertigo/dizziness which initially started 4/2017.  She reports symptoms were abrupt, unable to ambulate, was in bed for weeks.  She has been taking betahistine BID with some improvement, but has had some improvement in her balance, fullness in her ears.  She tried injections first but did not tolerate it and it caused high anxiety.  Pt reports her previous Neurologist Dr Rosa had recommended that she have a craniotomy which has caused her considerable stress.   Pt has been taking Clonazepam 0.25 mg nightly, but decided to stop it most of June and did not do well (very anxious, depressed). She is back to taking it 2-3 times a week. She did not experience any w/d symptoms.  Dr Zavaleta prescribed today Lexapro 10 mg - she believes to start taking 5 mg daily for unclear time duration (Rx in car, I tried to call patient to have her review it, but she could not understand it).     Pt reports this medical stressor has occurred along with problems with her 22 yo daughter (not getting along with her father), her son graduating  from high school.   She has taught x 23 years and she reports an incident occurred at school in the month of May which has caused her significant distress. She realized that during [employer] testing, there was something written on the wall that needed to be flipped over so the student would not have the information during the testing.   Pt did flip the info over within a few minutes, but was very bothered that she had done this so she admitted her wrong doing to her principal.  Pt reports she ended up being written up - the first time this has ever happened to her which has been very distressing for her.  Additionally, she reports there was a meeting at school with the student and other team members and she was so concerned they would talk about her, that she decided to record the conversation on her phone.  She realized later that nothing actually did record on her phone, but she has lived in intense fear that she may lose her job.  She also feels her coworkers are treating her differently, and she is upset that no one has asked her how she is doing.     Pt endorses the following:     MDE: depressed mood, anhedonia, hopelessness, feeling tired, worthlessness, + guilt.  poor focus. She has had decreased self care, excessive sleep.  Denies suicidal/homicidal ideations.  Appetite is ok.  PHQ-9: 12 (extremely difficult)  + isolative behavior. Pt is fearful about going back to school, especially team meeting in about 2 weeks.     IVONNE: Pt endorses feeling anxious, being unable to control anxiety, worrying excessively, trouble relaxing, irritability, restlessness. IVONNE-7: 12.  She denies symptoms of OCD    She denies hx of davina.  MDQ: 2 yes: irritability, easily distracted - minor problems.     Pt denies hx of violence, no HI.   She denies hx of auditory/visual hallucinations. Pt is paranoid that coworkers are plotting against her and talking about her. She does feel friends and coworkers are talking about her and are plotting  together.    Past Psychiatric History:  Prior diagnosis:  none  Inpatient psychiatric tx: none   Outpatient psychiatric tx:  None     Prior medications:   prescribed something x 2 days - Cymbalta or Wellbutrin - sick to stomach, did not take it PCP  Left work due to anxiety in  - mother ill. 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, congenital  Worst panic attack ever with [employer] M[employer]      Hx TBI: none   Hx seizures: none         Past Surgical History:  GALLBLADDER SURGERY[[id number]]       SECTION[[id number]                Family History:     Suicides:none   Substance abuse:  None   Bipolar Disorder:  None   Anxiety: father - anxieety   Depression: None       Social History:  Childhood: grew up in [place], 2 brother, middle child, 10 months older than younger brother, close to older brother, lost both parents 2010 mom, dad 11 months later   Marital status:  Children:   Resides: lives with  of 26 yrs, 24 yo daughter (), 16 yo - [employer], greatkkid, honor roll   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  She has not taken Clonazepam (too sedating)   Currently on medical leave from work - off until March. Receiving a portion of her salary. Will receive short term disability. Pt was seen x one by Dr Dominick [facility] upon prior referral of Dr Rosa (possible specialist in SCD).  He recommended PRN Xanax per pt in addition to what she is currently taking.  Pt asked for a support group to possibly attend - he agreed this would be helpful, possibility for future development.     She is much improved.  So relieved to not be in school environment  Unsure if she will f/up with Dr Santos or Dr Rosa - Dr Santos wants to do additional vestibular testing on her which made her feel worse/anxious in past - she endorses some anticipatory anxiety about how to proceed.     She denies depressed mood.  Motivation/energy are improving. With less anxiety, her vestibular symptoms are improved.   She is calmer, no recent anxiety attacks.  Worries some that she will run into coworkers in store "what will they think?"   Appetite is increased, hungry a lot.  Normally a light eater.  Less irritable.  Denies suicidal/homicidal ideations.  No davina.  Still expresses some paranoid thinking about coworkers, but able to let it go more easily.     She has downloaded a [employer] liana and has started Yoga.    Her  has also started doing Yoga with her.     reports pt is improved.  He is encouraging that she pursue vestibular testing. He does note paranoia is less.      Rare wine. No drug use `.    Clonazepam - stopped it, it was making her feel more down.      Medications:   Lexapro 20 mg daily   Abilify 2 mg daily     Psychotherapy:   · Target symptoms: depression, anxiety, paranoia   · Why chosen therapy is appropriate versus another modality: relevant to diagnosis, patient responds to this modality  · Outcome monitoring methods: self-report, observation  · Therapeutic " "intervention type: supportive psychotherapy, brief CBT  · Topics discussed/themes: building skills sets for symptom management, symptom recognition, nutrition, exercise  · The patient's response to the intervention is accepting. The patient's progress toward treatment goals is positive progress.  · Duration of intervention: 20 minutes    Review Of Systems:     GENERAL:  + weight gain Body mass index is 23.63 kg/m².  SKIN:  No rashes or lacerations  MOUTH & THROAT:  No dyskinetic movements or obvious goiter  CHEST:  No shortness of breath, hyperventilation or cough  CARDIOVASCULAR:  No tachycardia or chest pain  MUSCULOSKELETAL:  No pain or stiffness of the joints  NEUROLOGIC:  Fullness, off balance - vestibular symptoms are improved.   Current Evaluation:     Nutritional Screening: Considering the patient's height and weight, medications, medical history and preferences, should a referral be made to the dietitian? no    Constitutional  Vitals:  Most recent vital signs, dated less than 90 days prior to this appointment, were not reviewed (not available)  Vitals:    10/04/18 0900   BP: 107/75   Pulse: 65   Weight: 64.4 kg (141 lb 15.6 oz)   Height: 5' 5" (1.651 m)        General:  age appropriate, normal weight, well nourished, well dressed, neatly groomed     Musculoskeletal  Muscle Strength/Tone:  no tremor   Gait & Station:  non-ataxic     Psychiatric  Speech:  no latency; no press   Mood & Affect:  "better"  Full   Thought Process:  Linear, more logical    Associations:  No RAMAKRISHNA    Thought Content:  no suicidality, no homicidality, delusions, hallucinations: (auditory: no, visual: no), less paranoid   Insight:  improved   Judgement: Improved    Orientation:  grossly intact, person, place, situation, time/date, day of week, month of year, year   Memory: intact for content of interview, memory >3 objects at five mins, able to remember recent events- no, able to remember remote events- yes   Language: grossly intact "   Attention Span & Concentration:  able to focus   Fund of Knowledge:  intact and appropriate to age and level of education, familiar with aspects of current personal life       Relevant Elements of Neurological Exam: normal gait    Functioning in Relationships:  Spouse/partner: supportive, encouraged her to get help  Peers: limited   Employers: + conflicts, now on leave    Laboratory Data  No visits with results within 1 Month(s) from this visit.   Latest known visit with results is:   No results found for any previous visit.         Medications  Outpatient Encounter Medications as of 10/4/2018   Medication Sig Dispense Refill    ARIPiprazole (ABILIFY) 2 MG Tab Take 1 tablet (2 mg total) by mouth once daily. 30 tablet 1    betahistine HCl (BETAHISTINE, BULK, MISC) 1 capsule by Misc.(Non-Drug; Combo Route) route 2 (two) times daily. Its a compounded drug made into capsule 12 mg      escitalopram oxalate (LEXAPRO) 20 MG tablet Take 1 tablet (20 mg total) by mouth once daily. 30 tablet 1    [DISCONTINUED] escitalopram oxalate (LEXAPRO) 10 MG tablet Take one and one-half tablets by mouth daily until prescription runs out. 1 tablet 0     No facility-administered encounter medications on file as of 10/4/2018.            Assessment - Diagnosis - Goals:     Impression: pt presents for intake following high anxiety/stress related to health and occupational stressors. Pt's symptoms are bordering on delusional.  Family has been very concerned and her sister in law reached out to schedule this appt for her.  Pt with limited progress with addition of Lexapro. Addition of Abilify last visits showing small benefit.  Now with titration of Lexapro and taking Abilify several months and taking medical leave of absence, the patient is considerably improved.    MDD, single episode, in partial remission   R/O Delusional Disorder   Generalized Anxiety Disorder  Superior Canal Dehiscence     GAF: 60    Strengths and Liabilities:  Strength: Patient accepts guidance/feedback, Strength: Patient is expressive/articulate., Strength: Patient is intelligent.    Treatment Goals:  Specify outcomes written in observable, behavioral terms:   Anxiety: acquiring relapse prevention skills, reducing negative automatic thoughts, reducing physical symptoms of anxiety and reducing time spent worrying (<30 minutes/day)  Depression: acquiring relapse prevention skills, increasing energy, increasing interest in usual activities and increasing motivation    Treatment Plan/Recommendations:   · Medication Management: The risks and benefits of medication were discussed with the patient.    - continue Lexapro 20 mg daily to target anxiety, mood.   - Continue Abilify 2 mg daily for paranoia, augment treatment of mood symptoms.  Typical TENISHA's reviewed including weight gain, abnormal movements, EPS, TD, metabolic side effects.  - Small Rx of Xanax 0.25 mg daily PRN anxiety.  Discussed risk of decreased RT, sedation, addictive potential, and not to mix with alcohol.   - Pt instructed to go to ER with thoughts of harming self, others   - Call to report any worsening of symptoms or problems with the medication  - Continue medical leave of absence   - Psychotherapy is recommended; discussed [facility] - contact # provided.   - follow up with Neuro   - Discussed nonpharmacologic interventions for anxiety including regular exercise, healthy diet, practice of mindfulness, social relatedness      Return to Clinic: 6 - 8 weeks

## 2018-10-10 PROBLEM — F33.3 MDD (MAJOR DEPRESSIVE DISORDER), RECURRENT, SEVERE, WITH PSYCHOSIS: Status: ACTIVE | Noted: 2018-10-10

## 2018-11-19 ENCOUNTER — OFFICE VISIT (OUTPATIENT)
Dept: PSYCHIATRY | Facility: CLINIC | Age: 48
End: 2018-11-19
Payer: COMMERCIAL

## 2018-11-19 VITALS
HEIGHT: 65 IN | DIASTOLIC BLOOD PRESSURE: 74 MMHG | HEART RATE: 70 BPM | WEIGHT: 147.69 LBS | BODY MASS INDEX: 24.61 KG/M2 | SYSTOLIC BLOOD PRESSURE: 108 MMHG

## 2018-11-19 DIAGNOSIS — F33.3 MDD (MAJOR DEPRESSIVE DISORDER), RECURRENT, SEVERE, WITH PSYCHOSIS: ICD-10-CM

## 2018-11-19 DIAGNOSIS — F41.1 GAD (GENERALIZED ANXIETY DISORDER): ICD-10-CM

## 2018-11-19 PROCEDURE — 3008F BODY MASS INDEX DOCD: CPT | Mod: CPTII,S$GLB,, | Performed by: PSYCHIATRY & NEUROLOGY

## 2018-11-19 PROCEDURE — 99999 PR PBB SHADOW E&M-EST. PATIENT-LVL III: CPT | Mod: PBBFAC,,, | Performed by: PSYCHIATRY & NEUROLOGY

## 2018-11-19 PROCEDURE — 90833 PSYTX W PT W E/M 30 MIN: CPT | Mod: S$GLB,,, | Performed by: PSYCHIATRY & NEUROLOGY

## 2018-11-19 PROCEDURE — 99213 OFFICE O/P EST LOW 20 MIN: CPT | Mod: S$GLB,,, | Performed by: PSYCHIATRY & NEUROLOGY

## 2018-11-19 RX ORDER — ESCITALOPRAM OXALATE 20 MG/1
20 TABLET ORAL DAILY
Qty: 90 TABLET | Refills: 0 | Status: SHIPPED | OUTPATIENT
Start: 2018-11-19 | End: 2019-01-17 | Stop reason: SDUPTHER

## 2018-11-19 RX ORDER — ARIPIPRAZOLE 2 MG/1
TABLET ORAL
Qty: 1 TABLET | Refills: 0
Start: 2018-11-19 | End: 2018-11-21 | Stop reason: SDUPTHER

## 2018-11-19 RX ORDER — BUSPIRONE HYDROCHLORIDE 7.5 MG/1
TABLET ORAL
Qty: 60 TABLET | Refills: 2 | Status: SHIPPED | OUTPATIENT
Start: 2018-11-19 | End: 2019-01-17

## 2018-11-19 NOTE — PROGRESS NOTES
Outpatient Psychiatry Follow Up Visit (MD/NP)    11/19/2018    Gaby Álvarez, a 48 y.o. female, presenting for follow up visit. Met with patient alone     Reason for Encounter: self-referral. Patient complains of anxiety, depression.     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  I have not met patient before and do not feel this is a conflict of information.     Pt reports hx of a vestibular disorder, called superior canal dehiscence which is currently being evaluated.  She recently changed providers to a Neurotologist.in [place], Dr Zavaleta whom she saw today.  She is concerned because he does not file insurance and she will have to pay out of pocket for vestibular testing.  Pt reports symptoms of intense vertigo/dizziness which initially started 4/2017.  She reports symptoms were abrupt, unable to ambulate, was in bed for weeks.  She has been taking betahistine BID with some improvement, but has had some improvement in her balance, fullness in her ears.  She tried injections first but did not tolerate it and it caused high anxiety.  Pt reports her previous Neurologist Dr Rosa had recommended that she have a craniotomy which has caused her considerable stress.   Pt has been taking Clonazepam 0.25 mg nightly, but decided to stop it most of June and did not do well (very anxious, depressed). She is back to taking it 2-3 times a week. She did not experience any w/d symptoms.  Dr Zavaleta prescribed today Lexapro 10 mg - she believes to start taking 5 mg daily for unclear time duration (Rx in car, I tried to call patient to have her review it, but she could not understand it).     Pt reports this medical stressor has occurred along with problems with her 22 yo daughter (not getting along with her father), her son graduating from high school.    She has taught x 23 years and she reports an incident occurred at school in the month of May which has caused her significant distress. She realized that during LEAP testing, there was something written on the wall that needed to be flipped over so the student would not have the information during the testing.   Pt did flip the info over within a few minutes, but was very bothered that she had done this so she admitted her wrong doing to her principal.  Pt reports she ended up being written up - the first time this has ever happened to her which has been very distressing for her.  Additionally, she reports there was a meeting at school with the student and other team members and she was so concerned they would talk about her, that she decided to record the conversation on her phone.  She realized later that nothing actually did record on her phone, but she has lived in intense fear that she may lose her job.  She also feels her coworkers are treating her differently, and she is upset that no one has asked her how she is doing.     Pt endorses the following:     MDE: depressed mood, anhedonia, hopelessness, feeling tired, worthlessness, + guilt.  poor focus. She has had decreased self care, excessive sleep.  Denies suicidal/homicidal ideations.  Appetite is ok.  PHQ-9: 12 (extremely difficult)  + isolative behavior. Pt is fearful about going back to school, especially team meeting in about 2 weeks.     IVONNE: Pt endorses feeling anxious, being unable to control anxiety, worrying excessively, trouble relaxing, irritability, restlessness. IVONNE-7: 12.  She denies symptoms of OCD    She denies hx of davina.  MDQ: 2 yes: irritability, easily distracted - minor problems.     Pt denies hx of violence, no HI.   She denies hx of auditory/visual hallucinations. Pt is paranoid that coworkers are plotting against her and talking about her. She does feel friends and coworkers are talking about her and are plotting together.    Past  Psychiatric History:  Prior diagnosis:  none  Inpatient psychiatric tx: none   Outpatient psychiatric tx:  None     Prior medications:   prescribed something x 2 days - Cymbalta or Wellbutrin - sick to stomach, did not take it PCP  Left work due to anxiety in  - mother ill. 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, congenital  Worst panic attack ever with DH Maneveur      Hx TBI: none   Hx seizures: none         Past Surgical History:  GALLBLADDER SURGERY[PRC385]       SECTION[[id number]                Family History:     Suicides:none   Substance abuse:  None   Bipolar Disorder:  None   Anxiety: father - anxieety   Depression: None       Social History:  Childhood: grew up in [place], 2 brother, middle child, 10 months older than younger brother, close to older brother, lost both parents 2010 mom, dad 11 months later   Marital status:  Children:   Resides: lives with  of 26 yrs, 24 yo daughter (), 18 yo - [employer], greatkkid, honor roll   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  She has not taken Xanax.   Currently on medical leave from work - off until March. Receiving a portion of her salary. Receives short term disability. Pt was seen x one by Dr Dominick Hernandez [facility] upon prior referral of Dr Rosa (possible specialist in SCD).  He recommended PRN Xanax per pt in addition to what she is currently taking.  Pt asked for a support group to possibly attend - he agreed this would be helpful, possibility for future development.     Pt has not followed up with Dr Rosa or Dr Santos.  She is managing symptoms of Superior canal dehiscence with betahistine. Does not want surgery.   Pt has gained 15 lbs since intake, and 12 lbs since starting Abilify. Body mass index is 24.58 kg/m².  She has decided to retire from teaching position after 27 yrs.  Had hoped to make it to 28 yrs, but cannot even think about school without getting anxious.     Anxiety is decreased, worries about things,but can work way through it.  Appetite is increased. Hungry all the time. Sleeping well. Not been going to gym or doing Yoga. Isolating. She avoids coworkers, shops in stores in different town.  Feels less confident. Not going to social events. Less depressed. Enjoys some pleasurable activities with family.  Denies hopelessness/worthlessness. Denies suicidal/homicidal ideations.  No crying spells. Doubts self. Defines self as perfectionist.  Will miss students, but cannot think of going back into school.  Denies symptoms of davina/psychosis.   Can see how she put up a wall of defense and may have pushed friends/coworkers away. No paranoid ideations volunteered.     Denies suicidal/homicidal ideations.  Depression < 5/10. Anxiety 3/10. (10 worst)  No panic attacks or agoraphobia.     Ready to move forward with next chapter of her life.       Rare wine. No drug use `.    Psychotherapy:   · Target symptoms: depression, anxiety  · Why chosen therapy is appropriate versus  "another modality: relevant to diagnosis, patient responds to this modality  · Outcome monitoring methods: self-report, observation  · Therapeutic intervention type: supportive psychotherapy, brief CBT  · Topics discussed/themes: building skills sets for symptom management, symptom recognition, nutrition, exercise  · The patient's response to the intervention is accepting. The patient's progress toward treatment goals is fair progress.  · Duration of intervention: 20 minutes    Medications:   Lexapro 20 mg daily   Abilify 2 mg daily   Xanax 0.25 mg daily prn anxiety     Psychotherapy:   · Target symptoms: depression, anxiety, paranoia   · Why chosen therapy is appropriate versus another modality: relevant to diagnosis, patient responds to this modality  · Outcome monitoring methods: self-report, observation  · Therapeutic intervention type: supportive psychotherapy, brief CBT  · Topics discussed/themes: building skills sets for symptom management, symptom recognition, nutrition, exercise  · The patient's response to the intervention is accepting. The patient's progress toward treatment goals is positive progress.  · Duration of intervention: 20 minutes    Review Of Systems:     GENERAL:  + weight gain Body mass index is 24.58 kg/m².  CARDIOVASCULAR:  No tachycardia or chest pain  MUSCULOSKELETAL:  No pain or stiffness of the joints  NEUROLOGIC:  Fullness, off balance - vestibular symptoms are improved.   Current Evaluation:     Nutritional Screening: Considering the patient's height and weight, medications, medical history and preferences, should a referral be made to the dietitian? no    Constitutional  Vitals:  Most recent vital signs, dated less than 90 days prior to this appointment, were not reviewed (not available)  Vitals:    11/19/18 1350   BP: 108/74   Pulse: 70   Weight: 67 kg (147 lb 11.3 oz)   Height: 5' 5" (1.651 m)        General:  age appropriate, normal weight, well nourished, well dressed, neatly " "groomed, + wt gain noted      Musculoskeletal  Muscle Strength/Tone:  no tremor   Gait & Station:  non-ataxic     Psychiatric  Speech:  no latency; no press   Mood & Affect:  "ok"   Mostly full, smiles    Thought Process:  Linear, more logical    Associations:  No RAMAKRISHNA    Thought Content:  no suicidality, no homicidality, delusions, hallucinations: (auditory: no, visual: no), less paranoid   Insight:  improved   Judgement: Improved    Orientation:  grossly intact, person, place, situation, time/date, day of week, month of year, year   Memory: intact for content of interview, memory >3 objects at five mins, able to remember recent events- no, able to remember remote events- yes   Language: grossly intact   Attention Span & Concentration:  able to focus   Fund of Knowledge:  intact and appropriate to age and level of education, familiar with aspects of current personal life       Relevant Elements of Neurological Exam: normal gait    Functioning in Relationships:  Spouse/partner: supportive, encouraged her to get help  Peers: limited   Employers: + conflicts, now on leave    Laboratory Data  No visits with results within 1 Month(s) from this visit.   Latest known visit with results is:   No results found for any previous visit.         Medications  Outpatient Encounter Medications as of 11/19/2018   Medication Sig Dispense Refill    betahistine HCl (BETAHISTINE, BULK, MISC) 1 capsule by Misc.(Non-Drug; Combo Route) route 2 (two) times daily. Its a compounded drug made into capsule 12 mg      ALPRAZolam (XANAX) 0.25 MG tablet Take 1 tablet (0.25 mg total) by mouth daily as needed for Anxiety. 20 tablet 0    ARIPiprazole (ABILIFY) 2 MG Tab Take 1 tablet (2 mg total) by mouth once daily. 90 tablet 0    escitalopram oxalate (LEXAPRO) 20 MG tablet Take 1 tablet (20 mg total) by mouth once daily. 90 tablet 0     No facility-administered encounter medications on file as of 11/19/2018.            Assessment - Diagnosis - " Goals:     Impression: pt presents for intake following high anxiety/stress related to health and occupational stressors. Pt's symptoms are bordering on delusional.  Family has been very concerned and her sister in law reached out to schedule this appt for her.  Pt with limited progress with addition of Lexapro. Addition of Abilify last visits showing small benefit.  Now with titration of Lexapro and taking Abilify several months and taking medical leave of absence, the patient is improved. + wt gain on Abilify - request to wean off.     MDD, single episode, in partial remission   R/O Delusional Disorder   Generalized Anxiety Disorder  Superior Canal Dehiscence     GAF: 60    Strengths and Liabilities: Strength: Patient accepts guidance/feedback, Strength: Patient is expressive/articulate., Strength: Patient is intelligent.    Treatment Goals:  Specify outcomes written in observable, behavioral terms:   Anxiety: acquiring relapse prevention skills, reducing negative automatic thoughts, reducing physical symptoms of anxiety and reducing time spent worrying (<30 minutes/day)  Depression: acquiring relapse prevention skills, increasing energy, increasing interest in usual activities and increasing motivation    Treatment Plan/Recommendations:   · Medication Management: The risks and benefits of medication were discussed with the patient.    - continue Lexapro 20 mg daily to target anxiety, mood.   - Wean Abilify to 1 mg daily for 10 days, then stop - pt has gained significant wt on this med.   - Xanax 0.25 mg daily PRN anxiety.  Discussed risk of decreased RT, sedation, addictive potential, and not to mix with alcohol.   - Pt instructed to go to ER with thoughts of harming self, others   - Call to report any worsening of symptoms or problems with the medication  - Continue medical leave of absence   - Psychotherapy is recommended; discussed seeing LCSW at [facility] - contact # provided.   - follow up with Neuro   - Discussed  nonpharmacologic interventions for anxiety including regular exercise, healthy diet, practice of mindfulness, social relatedness  - Add Buspar 7.5 mg BID to target residual anxiety.  Discussed risks of med interaction.  Pt will wait until she has weaned off of Abilify before starting   - LAZARO: Dr Rosa - to update him on her current meds and to her [employer] for release of her info (pt presents a letter today from this company)      Return to Clinic: January

## 2018-11-20 ENCOUNTER — DOCUMENTATION ONLY (OUTPATIENT)
Dept: PSYCHIATRY | Facility: CLINIC | Age: 48
End: 2018-11-20

## 2018-11-20 NOTE — PROGRESS NOTES
Received lab work from [email or web address], 9/19/18:   Dr Poncho Rothman    T4, free 1.0  T3, free 2.6  DHEA 231    RBC Mg 4.7  Vitamin B6 10.1  Zinc 87     CMP: gluc 80, BUN 13, Cr 0.73, eGFR 96, Na 140, K 4.5, , Ca 9.9, normal liver enzymes     HbA1c 4.8    TSH 1.42

## 2018-11-21 ENCOUNTER — PATIENT MESSAGE (OUTPATIENT)
Dept: PSYCHIATRY | Facility: CLINIC | Age: 48
End: 2018-11-21

## 2018-11-21 RX ORDER — ARIPIPRAZOLE 2 MG/1
TABLET ORAL
Qty: 15 TABLET | Refills: 0 | Status: SHIPPED | OUTPATIENT
Start: 2018-11-21 | End: 2018-12-19

## 2018-12-03 ENCOUNTER — PATIENT MESSAGE (OUTPATIENT)
Dept: PSYCHIATRY | Facility: CLINIC | Age: 48
End: 2018-12-03

## 2018-12-10 ENCOUNTER — PATIENT MESSAGE (OUTPATIENT)
Dept: PSYCHIATRY | Facility: CLINIC | Age: 48
End: 2018-12-10

## 2018-12-18 ENCOUNTER — PATIENT MESSAGE (OUTPATIENT)
Dept: PSYCHIATRY | Facility: CLINIC | Age: 48
End: 2018-12-18

## 2018-12-18 RX ORDER — ARIPIPRAZOLE 2 MG/1
TABLET ORAL
Qty: 15 TABLET | Refills: 0 | OUTPATIENT
Start: 2018-12-18

## 2018-12-19 ENCOUNTER — PATIENT MESSAGE (OUTPATIENT)
Dept: PSYCHIATRY | Facility: CLINIC | Age: 48
End: 2018-12-19

## 2019-01-17 ENCOUNTER — OFFICE VISIT (OUTPATIENT)
Dept: PSYCHIATRY | Facility: CLINIC | Age: 49
End: 2019-01-17
Payer: COMMERCIAL

## 2019-01-17 ENCOUNTER — TELEPHONE (OUTPATIENT)
Dept: PSYCHIATRY | Facility: CLINIC | Age: 49
End: 2019-01-17

## 2019-01-17 VITALS
SYSTOLIC BLOOD PRESSURE: 110 MMHG | HEIGHT: 65 IN | WEIGHT: 157.19 LBS | HEART RATE: 75 BPM | DIASTOLIC BLOOD PRESSURE: 77 MMHG | BODY MASS INDEX: 26.19 KG/M2

## 2019-01-17 DIAGNOSIS — F41.1 GAD (GENERALIZED ANXIETY DISORDER): ICD-10-CM

## 2019-01-17 DIAGNOSIS — F33.3 MDD (MAJOR DEPRESSIVE DISORDER), RECURRENT, SEVERE, WITH PSYCHOSIS: ICD-10-CM

## 2019-01-17 PROCEDURE — 90833 PSYTX W PT W E/M 30 MIN: CPT | Mod: S$GLB,,, | Performed by: PSYCHIATRY & NEUROLOGY

## 2019-01-17 PROCEDURE — 99999 PR PBB SHADOW E&M-EST. PATIENT-LVL III: CPT | Mod: PBBFAC,,, | Performed by: PSYCHIATRY & NEUROLOGY

## 2019-01-17 PROCEDURE — 3008F PR BODY MASS INDEX (BMI) DOCUMENTED: ICD-10-PCS | Mod: CPTII,S$GLB,, | Performed by: PSYCHIATRY & NEUROLOGY

## 2019-01-17 PROCEDURE — 99999 PR PBB SHADOW E&M-EST. PATIENT-LVL III: ICD-10-PCS | Mod: PBBFAC,,, | Performed by: PSYCHIATRY & NEUROLOGY

## 2019-01-17 PROCEDURE — 3008F BODY MASS INDEX DOCD: CPT | Mod: CPTII,S$GLB,, | Performed by: PSYCHIATRY & NEUROLOGY

## 2019-01-17 PROCEDURE — 99213 PR OFFICE/OUTPT VISIT, EST, LEVL III, 20-29 MIN: ICD-10-PCS | Mod: S$GLB,,, | Performed by: PSYCHIATRY & NEUROLOGY

## 2019-01-17 PROCEDURE — 99213 OFFICE O/P EST LOW 20 MIN: CPT | Mod: S$GLB,,, | Performed by: PSYCHIATRY & NEUROLOGY

## 2019-01-17 PROCEDURE — 90833 PR PSYCHOTHERAPY W/PATIENT W/E&M, 30 MIN (ADD ON): ICD-10-PCS | Mod: S$GLB,,, | Performed by: PSYCHIATRY & NEUROLOGY

## 2019-01-17 RX ORDER — ESCITALOPRAM OXALATE 20 MG/1
20 TABLET ORAL DAILY
Qty: 90 TABLET | Refills: 1 | Status: SHIPPED | OUTPATIENT
Start: 2019-01-17 | End: 2019-03-06 | Stop reason: SDUPTHER

## 2019-01-17 NOTE — PROGRESS NOTES
Outpatient Psychiatry Follow Up Visit (MD/NP)    1/17/2019    Gaby Álvarez, a 48 y.o. female, presenting for follow up visit. Met with patient alone     Reason for Encounter: self-referral. Patient complains of anxiety, depression.     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  I have not met patient before and do not feel this is a conflict of information.     Pt reports hx of a vestibular disorder, called superior canal dehiscence which is currently being evaluated.  She recently changed providers to a Neurotologist.in [place], Dr Zavaleta whom she saw today.  She is concerned because he does not file insurance and she will have to pay out of pocket for vestibular testing.  Pt reports symptoms of intense vertigo/dizziness which initially started 4/2017.  She reports symptoms were abrupt, unable to ambulate, was in bed for weeks.  She has been taking betahistine BID with some improvement, but has had some improvement in her balance, fullness in her ears.  She tried injections first but did not tolerate it and it caused high anxiety.  Pt reports her previous Neurologist Dr Rosa had recommended that she have a craniotomy which has caused her considerable stress.   Pt has been taking Clonazepam 0.25 mg nightly, but decided to stop it most of June and did not do well (very anxious, depressed). She is back to taking it 2-3 times a week. She did not experience any w/d symptoms.  Dr Zavaleta prescribed today Lexapro 10 mg - she believes to start taking 5 mg daily for unclear time duration (Rx in car, I tried to call patient to have her review it, but she could not understand it).     Pt reports this medical stressor has occurred along with problems with her 24 yo daughter (not getting along with her father), her son graduating from high school.   She  has taught x 23 years and she reports an incident occurred at school in the month of May which has caused her significant distress. She realized that during [employer] testing, there was something written on the wall that needed to be flipped over so the student would not have the information during the testing.   Pt did flip the info over within a few minutes, but was very bothered that she had done this so she admitted her wrong doing to her principal.  Pt reports she ended up being written up - the first time this has ever happened to her which has been very distressing for her.  Additionally, she reports there was a meeting at school with the student and other team members and she was so concerned they would talk about her, that she decided to record the conversation on her phone.  She realized later that nothing actually did record on her phone, but she has lived in intense fear that she may lose her job.  She also feels her coworkers are treating her differently, and she is upset that no one has asked her how she is doing.     Pt endorses the following:     MDE: depressed mood, anhedonia, hopelessness, feeling tired, worthlessness, + guilt.  poor focus. She has had decreased self care, excessive sleep.  Denies suicidal/homicidal ideations.  Appetite is ok.  PHQ-9: 12 (extremely difficult)  + isolative behavior. Pt is fearful about going back to school, especially team meeting in about 2 weeks.     IVONNE: Pt endorses feeling anxious, being unable to control anxiety, worrying excessively, trouble relaxing, irritability, restlessness. IVONNE-7: 12.  She denies symptoms of OCD    She denies hx of davina.  MDQ: 2 yes: irritability, easily distracted - minor problems.     Pt denies hx of violence, no HI.   She denies hx of auditory/visual hallucinations. Pt is paranoid that coworkers are plotting against her and talking about her. She does feel friends and coworkers are talking about her and are plotting together.    Past  Psychiatric History:  Prior diagnosis:  none  Inpatient psychiatric tx: none   Outpatient psychiatric tx:  None     Prior medications:   prescribed something x 2 days - Cymbalta or Wellbutrin - sick to stomach, did not take it PCP  Left work due to anxiety in  - mother ill. 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, congenital  Worst panic attack ever with [employer]      Hx TBI: none   Hx seizures: none         Past Surgical History:  GALLBLADDER SURGERY[[id number]]       SECTION[[id number]                Family History:     Suicides:none   Substance abuse:  None   Bipolar Disorder:  None   Anxiety: father - anxieety   Depression: None       Social History:  Childhood: grew up in [place], 2 brother, middle child, 10 months older than younger brother, close to older brother, lost both parents 2010 mom, dad 11 months later   Marital status:  Children:   Resides: lives with  of 26 yrs, 22 yo daughter (), 18 yo - [employer], greatkkid, honor roll   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 Rare Xanax (1/4 tab), no Buspar.   Currently on medical leave from work - but thinks she can return to work soon.  She knows she will be anxious, but she is lacking a sense of purpose.  Does not feel productive.  Does not like being alone.  Feels lost.  She has not been exercising. Not eating healthy.  She has been trying to reach out to some of her coworkers to break the ice.     Pt has not followed up with Dr Rosa or Dr Santos.  She is managing symptoms of Superior canal dehiscence with betahistine. Does not want surgery.     Anxiety is decreased, worries about things,but can work way through it.  Appetite is increased. Sleeping well. Not been going to gym or doing Yoga. Less depressed. Enjoys some pleasurable activities with family.  Denies hopelessness/worthlessness. Denies suicidal/homicidal ideations.  No crying spells. Doubts self. Defines self as perfectionist. Mild paranoia volunteered, but dwelling on this less.   No recent panic attacks. Her SCD symptoms are improved since she is less anxious.   IVONNE-7: 3 (anxious, worrying, irritable).   PHQ-9: 6 (anhedonia, depressed mood, sleep and appetite issues, feels mad at herself). Staying up late. Trying not to wake up earlier. No hopelessness.    Denies suicidal/homicidal ideations.  Rare wine. No drug use `.    Psychotherapy:   · Target symptoms: depression, anxiety  · Why chosen therapy is appropriate versus another modality: relevant to diagnosis, patient responds to this modality  · Outcome monitoring methods: self-report, observation  · Therapeutic intervention type: supportive psychotherapy, brief CBT  · Topics discussed/themes: building skills sets for symptom management, symptom recognition, nutrition, exercise  · The patient's response to the intervention is accepting. The patient's progress toward treatment goals is fair progress.  · Duration of intervention: 20 minutes    Medications:   Lexapro 20  "mg daily   Xanax 0.25 mg daily prn anxiety     Psychotherapy:   · Target symptoms: depression, anxiety, paranoia   · Why chosen therapy is appropriate versus another modality: relevant to diagnosis, patient responds to this modality  · Outcome monitoring methods: self-report, observation  · Therapeutic intervention type: supportive psychotherapy, brief CBT  · Topics discussed/themes: building skills sets for symptom management, symptom recognition, nutrition, exercise  · The patient's response to the intervention is accepting. The patient's progress toward treatment goals is positive progress.  · Duration of intervention: 20 minutes    Review Of Systems:     GENERAL:  + weight gain Body mass index is 26.16 kg/m².  CARDIOVASCULAR:  No tachycardia or chest pain  MUSCULOSKELETAL:  No pain or stiffness of the joints  NEUROLOGIC:  Fullness, off balance - vestibular symptoms are improved.   Current Evaluation:     Nutritional Screening: Considering the patient's height and weight, medications, medical history and preferences, should a referral be made to the dietitian? no    Constitutional  Vitals:  Most recent vital signs, dated less than 90 days prior to this appointment, were not reviewed (not available)  Vitals:    01/17/19 1059   BP: 110/77   Pulse: 75   Weight: 71.3 kg (157 lb 3 oz)   Height: 5' 5" (1.651 m)        General:  age appropriate, normal weight, well nourished, well dressed, neatly groomed, + wt gain     Musculoskeletal  Muscle Strength/Tone:  no tremor   Gait & Station:  non-ataxic     Psychiatric  Speech:  no latency; no press   Mood & Affect:  "better"  Mostly full, smiles    Thought Process:  Linear, more logical    Associations:  No RAMAKRISHNA    Thought Content:  no suicidality, no homicidality, delusions, hallucinations: (auditory: no, visual: no), less paranoid   Insight:  improved   Judgement: Improved    Orientation:  grossly intact, person, place, situation, time/date, day of week, month of year, year "   Memory: intact for content of interview, memory >3 objects at five mins, able to remember recent events- no, able to remember remote events- yes   Language: grossly intact   Attention Span & Concentration:  able to focus   Fund of Knowledge:  intact and appropriate to age and level of education, familiar with aspects of current personal life       Relevant Elements of Neurological Exam: normal gait    Functioning in Relationships:  Spouse/partner: supportive, encouraged her to get help  Peers: limited   Employers:  on leave    Laboratory Data  No visits with results within 1 Month(s) from this visit.   Latest known visit with results is:   No results found for any previous visit.         Medications  Outpatient Encounter Medications as of 1/17/2019   Medication Sig Dispense Refill    betahistine HCl (BETAHISTINE, BULK, MISC) 1 capsule by Misc.(Non-Drug; Combo Route) route 2 (two) times daily. Its a compounded drug made into capsule 12 mg      ALPRAZolam (XANAX) 0.25 MG tablet Take 1 tablet (0.25 mg total) by mouth daily as needed for Anxiety. 20 tablet 0    escitalopram oxalate (LEXAPRO) 20 MG tablet Take 1 tablet (20 mg total) by mouth once daily. 90 tablet 0    [DISCONTINUED] busPIRone (BUSPAR) 7.5 MG tablet Take one tablet PO twice daily 60 tablet 2     No facility-administered encounter medications on file as of 1/17/2019.            Assessment - Diagnosis - Goals:     Impression: pt presents for intake following high anxiety/stress related to health and occupational stressors. Pt's symptoms are bordering on delusional.  Family has been very concerned and her sister in law reached out to schedule this appt for her.  Pt with limited progress with addition of Lexapro. Addition of Abilify last visits showing small benefit.  Now with titration of Lexapro and taking Abilify several months and taking medical leave of absence, the patient is improved. + wt gain on Abilify - request to wean off.  No significant  decompensation off of Abilify.  Pt would like to return to work.     MDD, single episode, in partial remission   R/O Delusional Disorder   Generalized Anxiety Disorder  Superior Canal Dehiscence     GAF: 60    Strengths and Liabilities: Strength: Patient accepts guidance/feedback, Strength: Patient is expressive/articulate., Strength: Patient is intelligent.    Treatment Goals:  Specify outcomes written in observable, behavioral terms:   Anxiety: acquiring relapse prevention skills, reducing negative automatic thoughts, reducing physical symptoms of anxiety and reducing time spent worrying (<30 minutes/day)  Depression: acquiring relapse prevention skills, increasing energy, increasing interest in usual activities and increasing motivation    Treatment Plan/Recommendations:   · Medication Management: The risks and benefits of medication were discussed with the patient.    - continue Lexapro 20 mg daily to target anxiety, mood.   - Xanax 0.25 mg daily PRN anxiety.  Discussed risk of decreased RT, sedation, addictive potential, and not to mix with alcohol.   - Pt instructed to go to ER with thoughts of harming self, others   - Call to report any worsening of symptoms or problems with the medication  - Psychotherapy is recommended; discussed seeing LCSW at [facility] [place] - contact # provided.   - follow up with Neuro   - Discussed nonpharmacologic interventions for anxiety including regular exercise, healthy diet, practice of mindfulness, social relatedness  - Pt cleared to return to work full time 1/24/19.   - LAZARO: Dr Rosa - to update him on her current meds and to her [employer] for release of her info (pt presents a letter today from this company)      Return to Clinic: about 2 months

## 2019-03-06 ENCOUNTER — OFFICE VISIT (OUTPATIENT)
Dept: PSYCHIATRY | Facility: CLINIC | Age: 49
End: 2019-03-06
Payer: COMMERCIAL

## 2019-03-06 VITALS
HEIGHT: 65 IN | HEART RATE: 63 BPM | BODY MASS INDEX: 26.12 KG/M2 | DIASTOLIC BLOOD PRESSURE: 78 MMHG | WEIGHT: 156.75 LBS | SYSTOLIC BLOOD PRESSURE: 114 MMHG

## 2019-03-06 DIAGNOSIS — F41.1 GAD (GENERALIZED ANXIETY DISORDER): ICD-10-CM

## 2019-03-06 DIAGNOSIS — F33.3 MDD (MAJOR DEPRESSIVE DISORDER), RECURRENT, SEVERE, WITH PSYCHOSIS: Primary | ICD-10-CM

## 2019-03-06 PROCEDURE — 99213 OFFICE O/P EST LOW 20 MIN: CPT | Mod: S$GLB,,, | Performed by: PSYCHIATRY & NEUROLOGY

## 2019-03-06 PROCEDURE — 99999 PR PBB SHADOW E&M-EST. PATIENT-LVL III: CPT | Mod: PBBFAC,,, | Performed by: PSYCHIATRY & NEUROLOGY

## 2019-03-06 PROCEDURE — 99213 PR OFFICE/OUTPT VISIT, EST, LEVL III, 20-29 MIN: ICD-10-PCS | Mod: S$GLB,,, | Performed by: PSYCHIATRY & NEUROLOGY

## 2019-03-06 PROCEDURE — 90833 PSYTX W PT W E/M 30 MIN: CPT | Mod: S$GLB,,, | Performed by: PSYCHIATRY & NEUROLOGY

## 2019-03-06 PROCEDURE — 90833 PR PSYCHOTHERAPY W/PATIENT W/E&M, 30 MIN (ADD ON): ICD-10-PCS | Mod: S$GLB,,, | Performed by: PSYCHIATRY & NEUROLOGY

## 2019-03-06 PROCEDURE — 3008F PR BODY MASS INDEX (BMI) DOCUMENTED: ICD-10-PCS | Mod: CPTII,S$GLB,, | Performed by: PSYCHIATRY & NEUROLOGY

## 2019-03-06 PROCEDURE — 3008F BODY MASS INDEX DOCD: CPT | Mod: CPTII,S$GLB,, | Performed by: PSYCHIATRY & NEUROLOGY

## 2019-03-06 PROCEDURE — 99999 PR PBB SHADOW E&M-EST. PATIENT-LVL III: ICD-10-PCS | Mod: PBBFAC,,, | Performed by: PSYCHIATRY & NEUROLOGY

## 2019-03-06 RX ORDER — AMOXICILLIN 500 MG/1
CAPSULE ORAL
COMMUNITY
Start: 2019-02-26 | End: 2019-06-05 | Stop reason: ALTCHOICE

## 2019-03-06 RX ORDER — ESCITALOPRAM OXALATE 20 MG/1
20 TABLET ORAL DAILY
Qty: 90 TABLET | Refills: 2 | Status: SHIPPED | OUTPATIENT
Start: 2019-03-06 | End: 2019-06-05 | Stop reason: SDUPTHER

## 2019-03-06 NOTE — PROGRESS NOTES
Outpatient Psychiatry Follow Up Visit (MD/NP)    3/6/2019    Gaby Álvarez, a 48 y.o. female, presenting for follow up visit. Met with patient alone     Reason for Encounter: self-referral. Patient complains of anxiety, depression.     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  I have not met patient before and do not feel this is a conflict of information.     Pt reports hx of a vestibular disorder, called superior canal dehiscence which is currently being evaluated.  She recently changed providers to a Neurotologist.in [place], Dr Zavaleta whom she saw today.  She is concerned because he does not file insurance and she will have to pay out of pocket for vestibular testing.  Pt reports symptoms of intense vertigo/dizziness which initially started 4/2017.  She reports symptoms were abrupt, unable to ambulate, was in bed for weeks.  She has been taking betahistine BID with some improvement, but has had some improvement in her balance, fullness in her ears.  She tried injections first but did not tolerate it and it caused high anxiety.  Pt reports her previous Neurologist Dr Rosa had recommended that she have a craniotomy which has caused her considerable stress.   Pt has been taking Clonazepam 0.25 mg nightly, but decided to stop it most of June and did not do well (very anxious, depressed). She is back to taking it 2-3 times a week. She did not experience any w/d symptoms.  Dr Zavaleta prescribed today Lexapro 10 mg - she believes to start taking 5 mg daily for unclear time duration (Rx in car, I tried to call patient to have her review it, but she could not understand it).     Pt reports this medical stressor has occurred along with problems with her 24 yo daughter (not getting along with her father), her son graduating from high school.   She  has taught x 23 years and she reports an incident occurred at school in the month of May which has caused her significant distress. She realized that during LE[employer] testing, there was something written on the wall that needed to be flipped over so the student would not have the information during the testing.   Pt did flip the info over within a few minutes, but was very bothered that she had done this so she admitted her wrong doing to her principal.  Pt reports she ended up being written up - the first time this has ever happened to her which has been very distressing for her.  Additionally, she reports there was a meeting at school with the student and other team members and she was so concerned they would talk about her, that she decided to record the conversation on her phone.  She realized later that nothing actually did record on her phone, but she has lived in intense fear that she may lose her job.  She also feels her coworkers are treating her differently, and she is upset that no one has asked her how she is doing.     Pt endorses the following:     MDE: depressed mood, anhedonia, hopelessness, feeling tired, worthlessness, + guilt.  poor focus. She has had decreased self care, excessive sleep.  Denies suicidal/homicidal ideations.  Appetite is ok.  PHQ-9: 12 (extremely difficult)  + isolative behavior. Pt is fearful about going back to school, especially team meeting in about 2 weeks.     IVONNE: Pt endorses feeling anxious, being unable to control anxiety, worrying excessively, trouble relaxing, irritability, restlessness. IVONNE-7: 12.  She denies symptoms of OCD    She denies hx of davina.  MDQ: 2 yes: irritability, easily distracted - minor problems.     Pt denies hx of violence, no HI.   She denies hx of auditory/visual hallucinations. Pt is paranoid that coworkers are plotting against her and talking about her. She does feel friends and coworkers are talking about her and are plotting together.    Past  Psychiatric History:  Prior diagnosis:  none  Inpatient psychiatric tx: none   Outpatient psychiatric tx:  None     Prior medications:   prescribed something x 2 days - Cymbalta or Wellbutrin - sick to stomach, did not take it PCP  Left work due to anxiety in  - mother ill. 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, congenital  Worst panic attack ever with [employer] Maneveur      Hx TBI: none   Hx seizures: none         Past Surgical History:  GALLBLADDER SURGERY[SOC019]       SECTION[[id number]                Family History:     Suicides:none   Substance abuse:  None   Bipolar Disorder:  None   Anxiety: father - anxieety   Depression: None       Social History:  Childhood: grew up in [place], 2 brother, middle child, 10 months older than younger brother, close to older brother, lost both parents 2010 mom, dad 11 months later   Marital status:  Children:   Resides: lives with  of 26 yrs, 24 yo daughter (), 16 yo - [employer], greatkkid, honor roll   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 Rare Xanax (1/4 - 1/2 tab)  She is managing symptoms of Superior canal dehiscence with betahistine. Does not want surgery.     "I am back at work. It was awkward.  But I am back at work.  I have lost friends. 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  She feels people scatter when she walks in.   Coworker made comment about problems with microphones.  Pt expressing mild paranoid ideations.     Feels stigmatized, but at point of whatever.  Sister in law dx with stage IV cancer. More in her plate/   She was my other go to.    tells her to stick to it.  She can retire in 2 yrs.   Feels she is being targeted in meetings with eye contact.     She feels herself.  With the children, she is focused on her job.  Does her job description.   Offers to help team.     Going back was extremely difficult.  Sucked it up.   Taking one step at a time.     Not depressed. Feels ok.  Will visit sister in law today.    I know these women.   I love when the bell rings, I go to duty, so at ease.       Sleeping well.  Appetite is fine.  Body mass index is 26.08 kg/m².   Eating healthy.  Sister in law motivating her.   trying to lose weight.      Energy is low at times.   Denies hopelessness/worthlessness.   Not doing housework - her goal this week.  Can experience.   Denies suicidal/homicidal ideations. Denies auditory/visual hallucinations  Self care is good.     Anxiety improved helps vestibular issue.  Quick dizzy spells, breathe through it.  Took one Xanax 1/4 tab crumb once, before she went back to fine.   Anticipating going back to work.    She has an URI - on antibiotic, sore thraot.     Can deescalate her own paranoia.  feels like she has not let go of what happened.   Anxiety was so intense, turned 48, son graduate, end of year baseball, work mistake, anxiety [employer] high   Feels she has gotten stronger.     Denies suicidal/homicidal ideations.  Rare wine. No " "drug use `.    Psychotherapy:   · Target symptoms: depression, anxiety  · Why chosen therapy is appropriate versus another modality: relevant to diagnosis, patient responds to this modality  · Outcome monitoring methods: self-report, observation  · Therapeutic intervention type: supportive psychotherapy  · Topics discussed/themes: building skills sets for symptom management, symptom recognition, nutrition, exercise  · The patient's response to the intervention is accepting. The patient's progress toward treatment goals is fair progress.  · Duration of intervention: 20 minutes    Medications:   Lexapro 20 mg daily   Xanax 0.25 mg daily prn anxiety - very rare     Psychotherapy:   · Target symptoms: depression, anxiety, paranoia   · Why chosen therapy is appropriate versus another modality: relevant to diagnosis, patient responds to this modality  · Outcome monitoring methods: self-report, observation  · Therapeutic intervention type: supportive psychotherapy, brief CBT  · Topics discussed/themes: building skills sets for symptom management, symptom recognition, nutrition, exercise  · The patient's response to the intervention is accepting. The patient's progress toward treatment goals is positive progress.  · Duration of intervention: 20 minutes    Review Of Systems:     GENERAL:  + weight gain Body mass index is 26.08 kg/m².  CARDIOVASCULAR:  No tachycardia or chest pain  MUSCULOSKELETAL:  No pain or stiffness of the joints  NEUROLOGIC:  Fullness, off balance - vestibular symptoms are improved.   Current Evaluation:     Nutritional Screening: Considering the patient's height and weight, medications, medical history and preferences, should a referral be made to the dietitian? no    Constitutional  Vitals:  Most recent vital signs, dated less than 90 days prior to this appointment, were not reviewed (not available)  Vitals:    03/06/19 0848   BP: 114/78   Pulse: 63   Weight: 71.1 kg (156 lb 12 oz)   Height: 5' 5" " "(1.651 m)        General:  age appropriate, normal weight, well nourished, well dressed, neatly groomed     Musculoskeletal  Muscle Strength/Tone:  no tremor   Gait & Station:  non-ataxic     Psychiatric  Speech:  no latency; no press   Mood & Affect:  "better"  Mostly full, smiles    Thought Process:  Linear with questions,    Associations:  No RAMAKRISHNA    Thought Content:  no suicidality, no homicidality, hallucinations: (auditory: no, visual: no), mild paranoid ideations    Insight:  improved   Judgement: Improved    Orientation:  grossly intact, person, place, situation, time/date, day of week, month of year, year   Memory: intact for content of interview, memory >3 objects at five mins, able to remember recent events- no, able to remember remote events- yes   Language: grossly intact   Attention Span & Concentration:  able to focus   Fund of Knowledge:  intact and appropriate to age and level of education, familiar with aspects of current personal life       Relevant Elements of Neurological Exam: normal gait    Functioning in Relationships:  Spouse/partner: supportive, encouraged her to get help  Peers: limited   Employers:  on leave    Laboratory Data  No visits with results within 1 Month(s) from this visit.   Latest known visit with results is:   No results found for any previous visit.         Medications  Outpatient Encounter Medications as of 3/6/2019   Medication Sig Dispense Refill    amoxicillin (AMOXIL) 500 MG capsule       betahistine HCl (BETAHISTINE, BULK, MISC) 1 capsule by Misc.(Non-Drug; Combo Route) route 2 (two) times daily. Its a compounded drug made into capsule 12 mg      ALPRAZolam (XANAX) 0.25 MG tablet Take 1 tablet (0.25 mg total) by mouth daily as needed for Anxiety. 20 tablet 0    escitalopram oxalate (LEXAPRO) 20 MG tablet Take 1 tablet (20 mg total) by mouth once daily. 90 tablet 1     No facility-administered encounter medications on file as of 3/6/2019.            Assessment - " Diagnosis - Goals:     Impression: pt presents for intake following high anxiety/stress related to health and occupational stressors. Pt's symptoms are bordering on delusional.  Family has been very concerned and her sister in law reached out to schedule this appt for her.  Pt with limited progress with addition of Lexapro. Addition of Abilify last visits showing small benefit.  Now with titration of Lexapro and taking Abilify several months and taking medical leave of absence, the patient is improved. + wt gain on Abilify - request to wean off.  No significant decompensation off of Abilify.  Pt has returned to work, reports she is doing fairly well.     MDD, single episode, in partial remission   R/O Delusional Disorder   Generalized Anxiety Disorder  Superior Canal Dehiscence     GAF: 60    Strengths and Liabilities: Strength: Patient accepts guidance/feedback, Strength: Patient is expressive/articulate., Strength: Patient is intelligent.    Treatment Goals:  Specify outcomes written in observable, behavioral terms:   Anxiety: acquiring relapse prevention skills, reducing negative automatic thoughts, reducing physical symptoms of anxiety and reducing time spent worrying (<30 minutes/day)  Depression: acquiring relapse prevention skills, increasing energy, increasing interest in usual activities and increasing motivation    Treatment Plan/Recommendations:   · Medication Management: The risks and benefits of medication were discussed with the patient.    - continue Lexapro 20 mg daily to target anxiety, mood.   - Xanax 0.25 mg daily PRN anxiety.  Discussed risk of decreased RT, sedation, addictive potential, and not to mix with alcohol.   - Pt instructed to go to ER with thoughts of harming self, others   - Call to report any worsening of symptoms or problems with the medication  - Psychotherapy is recommended; discussed seeing LCSW at [facility] [place] - contact # provided.   - follow up with Neuro   - Discussed nonpharmacologic  interventions for anxiety including regular exercise, healthy diet, practice of mindfulness, social relatedness  - Pt cleared to return to work full time 1/24/19.   - LAZARO: Dr Rosa - to update him on her current meds and to her [employer] for release of her info (pt presents a letter today from this company)      Return to Clinic: about 2 months

## 2019-03-06 NOTE — PATIENT INSTRUCTIONS
Psychotherapy:  Carey Cano LCSW,  Chalo Martinez LCSW, Dr Mayda Vivas psychologist    [phone]

## 2019-06-05 ENCOUNTER — OFFICE VISIT (OUTPATIENT)
Dept: PSYCHIATRY | Facility: CLINIC | Age: 49
End: 2019-06-05
Payer: COMMERCIAL

## 2019-06-05 VITALS
DIASTOLIC BLOOD PRESSURE: 71 MMHG | HEART RATE: 67 BPM | BODY MASS INDEX: 26.08 KG/M2 | HEIGHT: 65 IN | SYSTOLIC BLOOD PRESSURE: 110 MMHG | WEIGHT: 156.5 LBS

## 2019-06-05 DIAGNOSIS — F41.1 GAD (GENERALIZED ANXIETY DISORDER): ICD-10-CM

## 2019-06-05 DIAGNOSIS — F33.3 MDD (MAJOR DEPRESSIVE DISORDER), RECURRENT, SEVERE, WITH PSYCHOSIS: ICD-10-CM

## 2019-06-05 PROCEDURE — 3008F PR BODY MASS INDEX (BMI) DOCUMENTED: ICD-10-PCS | Mod: CPTII,S$GLB,, | Performed by: PSYCHIATRY & NEUROLOGY

## 2019-06-05 PROCEDURE — 99213 PR OFFICE/OUTPT VISIT, EST, LEVL III, 20-29 MIN: ICD-10-PCS | Mod: S$GLB,,, | Performed by: PSYCHIATRY & NEUROLOGY

## 2019-06-05 PROCEDURE — 90833 PR PSYCHOTHERAPY W/PATIENT W/E&M, 30 MIN (ADD ON): ICD-10-PCS | Mod: S$GLB,,, | Performed by: PSYCHIATRY & NEUROLOGY

## 2019-06-05 PROCEDURE — 99999 PR PBB SHADOW E&M-EST. PATIENT-LVL III: ICD-10-PCS | Mod: PBBFAC,,, | Performed by: PSYCHIATRY & NEUROLOGY

## 2019-06-05 PROCEDURE — 99213 OFFICE O/P EST LOW 20 MIN: CPT | Mod: S$GLB,,, | Performed by: PSYCHIATRY & NEUROLOGY

## 2019-06-05 PROCEDURE — 99999 PR PBB SHADOW E&M-EST. PATIENT-LVL III: CPT | Mod: PBBFAC,,, | Performed by: PSYCHIATRY & NEUROLOGY

## 2019-06-05 PROCEDURE — 90833 PSYTX W PT W E/M 30 MIN: CPT | Mod: S$GLB,,, | Performed by: PSYCHIATRY & NEUROLOGY

## 2019-06-05 PROCEDURE — 3008F BODY MASS INDEX DOCD: CPT | Mod: CPTII,S$GLB,, | Performed by: PSYCHIATRY & NEUROLOGY

## 2019-06-05 RX ORDER — ESCITALOPRAM OXALATE 20 MG/1
20 TABLET ORAL DAILY
Refills: 1 | COMMUNITY
Start: 2019-05-20 | End: 2019-06-05 | Stop reason: SDUPTHER

## 2019-06-05 RX ORDER — ESCITALOPRAM OXALATE 20 MG/1
20 TABLET ORAL DAILY
Qty: 90 TABLET | Refills: 2 | Status: SHIPPED | OUTPATIENT
Start: 2019-06-05 | End: 2019-07-30 | Stop reason: DRUGHIGH

## 2019-06-05 NOTE — PROGRESS NOTES
Outpatient Psychiatry Follow Up Visit (MD/NP)    6/5/2019    Gaby Álvarez, a 49 y.o. female, presenting for follow up visit. Met with patient alone     Reason for Encounter: self-referral. Patient complains of anxiety, depression.     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  I have not met patient before and do not feel this is a conflict of information.     Pt reports hx of a vestibular disorder, called superior canal dehiscence which is currently being evaluated.  She recently changed providers to a Neurotologist.in [place], Dr Zavaleta whom she saw today.  She is concerned because he does not file insurance and she will have to pay out of pocket for vestibular testing.  Pt reports symptoms of intense vertigo/dizziness which initially started 4/2017.  She reports symptoms were abrupt, unable to ambulate, was in bed for weeks.  She has been taking betahistine BID with some improvement, but has had some improvement in her balance, fullness in her ears.  She tried injections first but did not tolerate it and it caused high anxiety.  Pt reports her previous Neurologist Dr Rosa had recommended that she have a craniotomy which has caused her considerable stress.   Pt has been taking Clonazepam 0.25 mg nightly, but decided to stop it most of June and did not do well (very anxious, depressed). She is back to taking it 2-3 times a week. She did not experience any w/d symptoms.  Dr Zavaleta prescribed today Lexapro 10 mg - she believes to start taking 5 mg daily for unclear time duration (Rx in car, I tried to call patient to have her review it, but she could not understand it).     Pt reports this medical stressor has occurred along with problems with her 22 yo daughter (not getting along with her father), her son graduating from high school.   She  has taught x 23 years and she reports an incident occurred at school in the month of May which has caused her significant distress. She realized that during [employer] testing, there was something written on the wall that needed to be flipped over so the student would not have the information during the testing.   Pt did flip the info over within a few minutes, but was very bothered that she had done this so she admitted her wrong doing to her principal.  Pt reports she ended up being written up - the first time this has ever happened to her which has been very distressing for her.  Additionally, she reports there was a meeting at school with the student and other team members and she was so concerned they would talk about her, that she decided to record the conversation on her phone.  She realized later that nothing actually did record on her phone, but she has lived in intense fear that she may lose her job.  She also feels her coworkers are treating her differently, and she is upset that no one has asked her how she is doing.     Pt endorses the following:     MDE: depressed mood, anhedonia, hopelessness, feeling tired, worthlessness, + guilt.  poor focus. She has had decreased self care, excessive sleep.  Denies suicidal/homicidal ideations.  Appetite is ok.  PHQ-9: 12 (extremely difficult)  + isolative behavior. Pt is fearful about going back to school, especially team meeting in about 2 weeks.     IVONNE: Pt endorses feeling anxious, being unable to control anxiety, worrying excessively, trouble relaxing, irritability, restlessness. IVONNE-7: 12.  She denies symptoms of OCD    She denies hx of davina.  MDQ: 2 yes: irritability, easily distracted - minor problems.     Pt denies hx of violence, no HI.   She denies hx of auditory/visual hallucinations. Pt is paranoid that coworkers are plotting against her and talking about her. She does feel friends and coworkers are talking about her and are plotting together.    Past  Psychiatric History:  Prior diagnosis:  none  Inpatient psychiatric tx: none   Outpatient psychiatric tx:  None     Prior medications:   prescribed something x 2 days - Cymbalta or Wellbutrin - sick to stomach, did not take it PCP  Left work due to anxiety in  - mother ill. 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, congenital  Worst panic attack ever with [employer] Maneveur      Hx TBI: none   Hx seizures: none         Past Surgical History:  GALLBLADDER SURGERY[[id number]]       SECTION[[id number]                Family History:     Suicides:none   Substance abuse:  None   Bipolar Disorder:  None   Anxiety: father - anxieety   Depression: None       Social History:  Childhood: grew up in [place], 2 brother, middle child, 10 months older than younger brother, close to older brother, lost both parents 2010 mom, dad 11 months later   Marital status:  Children:   Resides: lives with  of 26 yrs, 24 yo daughter (), 16 yo - [employer], greatkkid, honor roll   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 Rare Xanax (1/4 - 1/2 tab)  She is managing symptoms of Superior canal dehiscence with betahistine which she reduced to once daily. Has not followed up yet with Dr Rosa. Does not want surgery.    Pt was able to return to work and complete school year.  She looks forward to [employer] at 28 yr donald, 2/2021.  Feels it is time to retire.  Notes staff/faculty treat her differently now. No lary paranoia, but may be more sensitive to other's behavior and internalizing that it is relating to her, some rejection sensitivity.  Does not feel accepted - she offers several scenarios in which it is understandable why she feels this way.  Her sister in law's cancer has really put things into perspective for her.  She feels better now that she is off of school.  [place], does not like having to run into people.  Really enjoying craft hobby, decorating shoes for [employer].   Daughter helps her.  Daughter now has good job, son is home from college.  Overall, she feels more positive.  "It is all good."   She considered lowering her dose of Lexapro, but she made a small mistake with standardized testing and this triggered her and was reminder of last year.  Felt down a little, but now improved. Slept more that week.  Realizes she needs dose of Lexapro she is taking. Did take Xanax a few times that week.   She is trying to work out more on treadmill and eat healthier.   Denies suicidal/homicidal ideations. Denies symptoms of davina/psychosis. No self harm or violence. No panic attacks or symptoms of OCD.     IVONNE-7: 3, somewhat difficult (nerovus, worry, impending doom).  PHQ-9: 5 (some feeling down, anhedonia, fatigue, appetite, feeling down about self). Sleeps 8 hrs a night.     Pt notes her skin on fingers keeps splitting, is dry, sometimes, she picks at it.  Primarily involves her thumb and first finger.  Noted also on palm of right hand. No other " "rashes.  Fingers feels a little swollen. Uses aquaphor which helps some. Unclear if related to crafting, no new products used.     Can deescalate her anxiety, paranoia now.  Anxiety was so intense, turned 48, son graduate, end of year baseball, work mistake, anxiety [employer] high   Feels she has gotten stronger.     Rare wine. No drug use `.    Psychotherapy:   · Target symptoms: depression, anxiety  · Why chosen therapy is appropriate versus another modality: relevant to diagnosis, patient responds to this modality  · Outcome monitoring methods: self-report, observation  · Therapeutic intervention type: supportive psychotherapy, insight oriented   · Topics discussed/themes: building skills sets for symptom management, symptom recognition, nutrition, exercise  · The patient's response to the intervention is accepting. The patient's progress toward treatment goals is good progress.  · Duration of intervention: 20 minutes    Medications:   Lexapro 20 mg daily   Xanax 0.25 mg daily prn anxiety - rare     Review Of Systems:     GENERAL:  weight stable - Body mass index is 26.05 kg/m².  CARDIOVASCULAR:  No tachycardia or chest pain  MUSCULOSKELETAL:  No pain or stiffness of the joints  NEUROLOGIC:  Fullness, off balance - vestibular symptoms are improved.   DERM: skin is peeling on distal finger tips  Current Evaluation:     Nutritional Screening: Considering the patient's height and weight, medications, medical history and preferences, should a referral be made to the dietitian? no    Constitutional  Vitals:  Most recent vital signs, dated more than 90 days prior to this appointment, were reviewed (not available)  Vitals:    06/05/19 0951   BP: 110/71   Pulse: 67   Weight: 71 kg (156 lb 8.4 oz)   Height: 5' 5" (1.651 m)        General:  age appropriate, normal weight, well nourished, well dressed, neatly groomed     Musculoskeletal  Muscle Strength/Tone:  no tremor   Gait & Station:  non-ataxic     Psychiatric  Speech:  no " "latency; no press   Mood & Affect:  "better"  Mostly full, smiles    Thought Process:  Linear with questions,    Associations:  No RAMAKRISHNA    Thought Content:  no suicidality, no homicidality, hallucinations: (auditory: no, visual: no), no paranoid ideations    Insight:  improved   Judgement: Improved    Orientation:  grossly intact, person, place, situation, time/date, day of week, month of year, year   Memory: intact for content of interview, memory >3 objects at five mins, able to remember recent events- no, able to remember remote events- yes   Language: grossly intact   Attention Span & Concentration:  able to focus   Fund of Knowledge:  intact and appropriate to age and level of education, familiar with aspects of current personal life       Relevant Elements of Neurological Exam: normal gait    Functioning in Relationships:  Spouse/partner: supportive, encouraged her to get help  Peers: limited   Employers:  on leave    Laboratory Data  No visits with results within 1 Month(s) from this visit.   Latest known visit with results is:   No results found for any previous visit.         Medications  Outpatient Encounter Medications as of 6/5/2019   Medication Sig Dispense Refill    betahistine HCl (BETAHISTINE, BULK, MISC) 1 capsule by Misc.(Non-Drug; Combo Route) route 2 (two) times daily. Its a compounded drug made into capsule 12 mg      escitalopram oxalate (LEXAPRO) 20 MG tablet Take 20 mg by mouth once daily.  1    ALPRAZolam (XANAX) 0.25 MG tablet Take 1 tablet (0.25 mg total) by mouth daily as needed for Anxiety. 20 tablet 0    escitalopram oxalate (LEXAPRO) 20 MG tablet Take 1 tablet (20 mg total) by mouth once daily. 90 tablet 2    [DISCONTINUED] amoxicillin (AMOXIL) 500 MG capsule        No facility-administered encounter medications on file as of 6/5/2019.            Assessment - Diagnosis - Goals:     Impression: pt presents for intake following high anxiety/stress related to health and occupational " stressors. Pt's symptoms are bordering on delusional.  Family has been very concerned and her sister in law reached out to schedule this appt for her.  Pt with limited progress with addition of Lexapro. Addition of Abilify last visits showing small benefit.  Now with titration of Lexapro and taking Abilify several months and taking medical leave of absence, the patient is improved. + wt gain on Abilify - request to wean off.  No significant decompensation off of Abilify.  Pt has returned to work, reports she is doing fairly well despite stressors of job. Work performance is good.  Plans to retire in 1.5 yrs, at her 28 yr donald. Seems to be tolerating lexapro. Will need derm eval of skin concerns on fingers.     MDD, single episode, in partial remission   Generalized Anxiety Disorder  Superior Canal Dehiscence     GAF: 63     Strengths and Liabilities: Strength: Patient accepts guidance/feedback, Strength: Patient is expressive/articulate., Strength: Patient is intelligent.    Treatment Goals:  Specify outcomes written in observable, behavioral terms:   Anxiety: acquiring relapse prevention skills, reducing negative automatic thoughts, reducing physical symptoms of anxiety and reducing time spent worrying (<30 minutes/day)  Depression: acquiring relapse prevention skills, increasing energy, increasing interest in usual activities and increasing motivation    Treatment Plan/Recommendations:   · Medication Management: The risks and benefits of medication were discussed with the patient.    - continue Lexapro 20 mg daily to target anxiety, mood.   - Xanax 0.25 mg daily PRN anxiety.  Discussed risk of decreased RT, sedation, addictive potential, and not to mix with alcohol.   - Pt instructed to go to ER with thoughts of harming self, others   - Call to report any worsening of symptoms or problems with the medication  - Psychotherapy is recommended; discussed seeing LCSW at [facility] - contact # provided. She has not followed up    - follow up with Neuro   - Discussed nonpharmacologic interventions for anxiety including regular exercise, healthy diet, practice of mindfulness, social relatedness  - Recommend she schedule an appt with her Dermatologist for evaluation of skin concerns on her finger - not clearly related to lexapro, no generalized rash or symptoms reported.      Return to Clinic: about 2 months - prior to school starting

## 2019-07-30 ENCOUNTER — OFFICE VISIT (OUTPATIENT)
Dept: PSYCHIATRY | Facility: CLINIC | Age: 49
End: 2019-07-30
Payer: COMMERCIAL

## 2019-07-30 VITALS
WEIGHT: 147.94 LBS | SYSTOLIC BLOOD PRESSURE: 109 MMHG | HEIGHT: 65 IN | HEART RATE: 66 BPM | DIASTOLIC BLOOD PRESSURE: 72 MMHG | BODY MASS INDEX: 24.65 KG/M2

## 2019-07-30 DIAGNOSIS — F33.3 MDD (MAJOR DEPRESSIVE DISORDER), RECURRENT, SEVERE, WITH PSYCHOSIS: ICD-10-CM

## 2019-07-30 DIAGNOSIS — F41.1 GAD (GENERALIZED ANXIETY DISORDER): ICD-10-CM

## 2019-07-30 PROCEDURE — 3008F BODY MASS INDEX DOCD: CPT | Mod: CPTII,S$GLB,, | Performed by: PSYCHIATRY & NEUROLOGY

## 2019-07-30 PROCEDURE — 99999 PR PBB SHADOW E&M-EST. PATIENT-LVL III: CPT | Mod: PBBFAC,,, | Performed by: PSYCHIATRY & NEUROLOGY

## 2019-07-30 PROCEDURE — 3008F PR BODY MASS INDEX (BMI) DOCUMENTED: ICD-10-PCS | Mod: CPTII,S$GLB,, | Performed by: PSYCHIATRY & NEUROLOGY

## 2019-07-30 PROCEDURE — 99999 PR PBB SHADOW E&M-EST. PATIENT-LVL III: ICD-10-PCS | Mod: PBBFAC,,, | Performed by: PSYCHIATRY & NEUROLOGY

## 2019-07-30 PROCEDURE — 99214 PR OFFICE/OUTPT VISIT, EST, LEVL IV, 30-39 MIN: ICD-10-PCS | Mod: S$GLB,,, | Performed by: PSYCHIATRY & NEUROLOGY

## 2019-07-30 PROCEDURE — 99214 OFFICE O/P EST MOD 30 MIN: CPT | Mod: S$GLB,,, | Performed by: PSYCHIATRY & NEUROLOGY

## 2019-07-30 RX ORDER — CRISABOROLE 20 MG/G
OINTMENT TOPICAL
COMMUNITY
Start: 2019-06-17 | End: 2020-02-27

## 2019-07-30 RX ORDER — ESCITALOPRAM OXALATE 10 MG/1
TABLET ORAL
Qty: 45 TABLET | Refills: 0 | Status: SHIPPED | OUTPATIENT
Start: 2019-07-30 | End: 2019-09-03 | Stop reason: SDUPTHER

## 2019-07-30 RX ORDER — ESCITALOPRAM OXALATE 10 MG/1
TABLET ORAL
Qty: 30 TABLET | Refills: 0 | Status: SHIPPED | OUTPATIENT
Start: 2019-07-30 | End: 2019-07-30 | Stop reason: SDUPTHER

## 2019-07-30 NOTE — PATIENT INSTRUCTIONS
Reduce Lexapro to 15 mg daily for 2 weeks (using 10 mg tablet - one and one half tabs), then reduce to 10 mg daily (ok to break 20 mg tab in half too).       Please update me in about 3-4 weeks

## 2019-07-30 NOTE — PROGRESS NOTES
Outpatient Psychiatry Follow Up Visit (MD/NP)    7/30/2019    Gaby Álvarez, a 49 y.o. female, presenting for follow up visit. Met with patient alone     Reason for Encounter: self-referral. Patient complains of anxiety, depression.     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  I have not met patient before and do not feel this is a conflict of information.     Pt reports hx of a vestibular disorder, called superior canal dehiscence which is currently being evaluated.  She recently changed providers to a Neurotologist.in [place], Dr Zavaleta whom she saw today.  She is concerned because he does not file insurance and she will have to pay out of pocket for vestibular testing.  Pt reports symptoms of intense vertigo/dizziness which initially started 4/2017.  She reports symptoms were abrupt, unable to ambulate, was in bed for weeks.  She has been taking betahistine BID with some improvement, but has had some improvement in her balance, fullness in her ears.  She tried injections first but did not tolerate it and it caused high anxiety.  Pt reports her previous Neurologist Dr Rosa had recommended that she have a craniotomy which has caused her considerable stress.   Pt has been taking Clonazepam 0.25 mg nightly, but decided to stop it most of June and did not do well (very anxious, depressed). She is back to taking it 2-3 times a week. She did not experience any w/d symptoms.  Dr Zavaleta prescribed today Lexapro 10 mg - she believes to start taking 5 mg daily for unclear time duration (Rx in car, I tried to call patient to have her review it, but she could not understand it).     Pt reports this medical stressor has occurred along with problems with her 24 yo daughter (not getting along with her father), her son graduating from high school.   She  has taught x 23 years and she reports an incident occurred at school in the month of May which has caused her significant distress. She realized that during [employer] testing, there was something written on the wall that needed to be flipped over so the student would not have the information during the testing.   Pt did flip the info over within a few minutes, but was very bothered that she had done this so she admitted her wrong doing to her principal.  Pt reports she ended up being written up - the first time this has ever happened to her which has been very distressing for her.  Additionally, she reports there was a meeting at school with the student and other team members and she was so concerned they would talk about her, that she decided to record the conversation on her phone.  She realized later that nothing actually did record on her phone, but she has lived in intense fear that she may lose her job.  She also feels her coworkers are treating her differently, and she is upset that no one has asked her how she is doing.     Pt endorses the following:     MDE: depressed mood, anhedonia, hopelessness, feeling tired, worthlessness, + guilt.  poor focus. She has had decreased self care, excessive sleep.  Denies suicidal/homicidal ideations.  Appetite is ok.  PHQ-9: 12 (extremely difficult)  + isolative behavior. Pt is fearful about going back to school, especially team meeting in about 2 weeks.     IVONNE: Pt endorses feeling anxious, being unable to control anxiety, worrying excessively, trouble relaxing, irritability, restlessness. IVONNE-7: 12.  She denies symptoms of OCD    She denies hx of davina.  MDQ: 2 yes: irritability, easily distracted - minor problems.     Pt denies hx of violence, no HI.   She denies hx of auditory/visual hallucinations. Pt is paranoid that coworkers are plotting against her and talking about her. She does feel friends and coworkers are talking about her and are plotting together.    Past  Psychiatric History:  Prior diagnosis:  none  Inpatient psychiatric tx: none   Outpatient psychiatric tx:  None     Prior medications:   prescribed something x 2 days - Cymbalta or Wellbutrin - sick to stomach, did not take it PCP  Left work due to anxiety in  - mother ill. 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, congenital  Worst panic attack ever with DH Maneveur      Hx TBI: none   Hx seizures: none         Past Surgical History:  GALLBLADDER SURGERY[[id number]]       SECTION[[id number]                Family History:     Suicides:none   Substance abuse:  None   Bipolar Disorder:  None   Anxiety: father - anxieety   Depression: None       Social History:  Childhood: grew up in [place], 2 brother, middle child, 10 months older than younger brother, close to older brother, lost both parents 2010 mom, dad 11 months later   Marital status:  Children:   Resides: lives with  of 26 yrs, 24 yo daughter (), 16 yo - [employer], greatkkid, honor roll   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 Rare Xanax (1/4 - 1/2 tab)  She is managing symptoms of Superior canal dehiscence with betahistine which she reduced to every other daily. Has not followed up yet with Dr Rosa but contacted his office to discuss med reduction. .SCD symptoms are improved and she believes were being triggered by anxiety which is now improved.   She was off balance x 2, which was relieved by Xanax and she believes may have been anxiety after all.  She is careful not to take Xanax often.   Soon will have Ob Gyn visit and will have hormones checked.     Pt will return to school 8/1/19.  She feels anxious about new year, but managing. There are several women at her work who are  and speak to her in a way which makes her uncomfortable which she feels may relate back to her concerns from prior school year, but she does not dwell on it.  She brought a cousin with her to school one day and cousin noted the women were acting odd, "there was an elephant in the room"  (which was validating).  She will retire in 17 months and soon will announce to the school. She has had an active summer around the house and with family.  Her sister in law is going through cancer tx and this has given pt perspective.  She has continued working on [employer] arts and crafts which she enjoys.     PHQ-9: 2.  Not difficult at all. (sleeps too much, fatigue).  Sleeps 10 hrs a night (summer routine).  She is following keto diet and has lost 9 lbs.     IVONNE-7: 2 somewhat difficult. (nervous, worried).      Pt does not feel depressed.  No anhedonia.  Denies suicidal/homicidal ideations. Denies symptoms of davina/psychosis. No significant paranoia.  Denies hopelessness/worthlessness.  No self harm or violence.     Pt continues to report peeling on tips of fingers which is now involving most fingers bilaterally, her ankle/backs of her heels, and achilles area, and has small patches on elbows, over eyelids. Sometimes she picks the skin. " " It first involved her thumb, first finger but has spread to other fingers and palms.  Pt saw dermatology PA and was dx with probable psoriasis and was given Rx of Eucrisa. She is also using Eucerin and Cereva.  The skin splits, cracks.  No blisters.  No systemic symptoms, no fever. She questions if could be med related - this is why she lowered betahistine - was told it could dry her skin.    Symptoms first started not long after she started lexapro     Anxiety was so intense last year - she had turned 48, son graduated from high school, end of year baseball, work mistake, anxiety [employer]   Feels she has gotten stronger.     Rare wine - less on diet. No drug use `.    Medications:   Lexapro 20 mg daily   Xanax 0.25 mg daily prn anxiety - rare   Betahistine every other day    Review Of Systems:     GENERAL:  weight loss   CARDIOVASCULAR:  No tachycardia or chest pain  MUSCULOSKELETAL:  No pain or stiffness of the joints  NEUROLOGIC:  Fullness, off balance - vestibular symptoms are improved.   DERM: skin peeling, possible psoriasis   Current Evaluation:     Nutritional Screening: Considering the patient's height and weight, medications, medical history and preferences, should a referral be made to the dietitian? no    Constitutional  Vitals:  Most recent vital signs, dated more than 90 days prior to this appointment, were reviewed (not available)  Vitals:    07/30/19 0832   BP: 109/72   Pulse: 66   Weight: 67.1 kg (147 lb 14.9 oz)   Height: 5' 5" (1.651 m)        General:  age appropriate, normal weight, well nourished, well dressed, neatly groomed, thinner      Musculoskeletal  Muscle Strength/Tone:  no tremor   Gait & Station:  non-ataxic     Psychiatric  Speech:  no latency; no press   Mood & Affect:  "better"  full    Thought Process:  Linear with questions,    Associations:  No RAMAKRISHNA    Thought Content:  no suicidality, no homicidality, hallucinations: (auditory: no, visual: no), no paranoid ideations    Insight: "  improved   Judgement: Improved    Orientation:  grossly intact, person, place, situation, time/date, day of week, month of year, year   Memory: intact for content of interview, memory >3 objects at five mins, able to remember recent events- no, able to remember remote events- yes   Language: grossly intact   Attention Span & Concentration:  able to focus   Fund of Knowledge:  intact and appropriate to age and level of education, familiar with aspects of current personal life       Relevant Elements of Neurological Exam: normal gait    Functioning in Relationships:  Spouse/partner: supportive, encouraged her to get help  Peers: limited   Employers:  Will retire in 17 months, returns to school this week     Laboratory Data  No visits with results within 1 Month(s) from this visit.   Latest known visit with results is:   No results found for any previous visit.         Medications  Outpatient Encounter Medications as of 7/30/2019   Medication Sig Dispense Refill    betahistine HCl (BETAHISTINE, BULK, MISC) 1 capsule by Misc.(Non-Drug; Combo Route) route 2 (two) times daily. Its a compounded drug made into capsule 12 mg   Patient only taking one daily now      EUCRISA 2 % Oint       ALPRAZolam (XANAX) 0.25 MG tablet Take 1 tablet (0.25 mg total) by mouth daily as needed for Anxiety. 20 tablet 0    escitalopram oxalate (LEXAPRO) 20 MG tablet Take 1 tablet (20 mg total) by mouth once daily. 90 tablet 2     No facility-administered encounter medications on file as of 7/30/2019.            Assessment - Diagnosis - Goals:     Impression: pt presents for intake following high anxiety/stress related to health and occupational stressors. Pt's symptoms are bordering on delusional.  Family has been very concerned and her sister in law reached out to schedule this appt for her.  Pt with limited progress with addition of Lexapro. Addition of Abilify last visits showing small benefit.  Now with titration of Lexapro and taking  Abilify several months and taking medical leave of absence, the patient is improved. + wt gain on Abilify - request to wean off.  No significant decompensation off of Abilify.  Pt has returned to work, reports she is doing fairly well despite stressors of job. Work performance is good.  Plans to retire in 1.5 yrs, at her 28 yr donald. Requests to reduce lexapro in case it is causing skin symptoms. Has seen derm - thought to be possibly related to psoriasis.     MDD, single episode, in full remission   Generalized Anxiety Disorder  Superior Canal Dehiscence   Possible Psoriasis     GAF: 63     Strengths and Liabilities: Strength: Patient accepts guidance/feedback, Strength: Patient is expressive/articulate., Strength: Patient is intelligent.    Treatment Goals:  Specify outcomes written in observable, behavioral terms:   Anxiety: acquiring relapse prevention skills, reducing negative automatic thoughts, reducing physical symptoms of anxiety and reducing time spent worrying (<30 minutes/day)  Depression: acquiring relapse prevention skills, increasing energy, increasing interest in usual activities and increasing motivation    Treatment Plan/Recommendations:   · Medication Management: The risks and benefits of medication were discussed with the patient.    - reduce Lexapro 20 mg:  Take one and one-half tablets by mouth daily for 2 weeks, then reduce to one tablet by mouth daily  - continue Xanax 0.25 mg daily PRN anxiety.  Discussed risk of decreased RT, sedation, addictive potential, and not to mix with alcohol. (infrequent)  - Pt instructed to go to ER with thoughts of harming self, others   - Call to report any worsening of symptoms or problems with the medication  - Psychotherapy is recommended; discussed [place] [facility] [place]. She has not followed up   - follow up with Dermatology   - Discussed nonpharmacologic interventions for anxiety including regular exercise, healthy diet, practice of  mindfulness, social relatedness    Return to Clinic: about 2 months, pt asked to update me on how she is doing in 3-4 weeks

## 2019-08-05 ENCOUNTER — PATIENT MESSAGE (OUTPATIENT)
Dept: PSYCHIATRY | Facility: CLINIC | Age: 49
End: 2019-08-05

## 2019-09-03 RX ORDER — ESCITALOPRAM OXALATE 10 MG/1
TABLET ORAL
Qty: 30 TABLET | Refills: 2 | Status: SHIPPED | OUTPATIENT
Start: 2019-09-03 | End: 2019-09-26 | Stop reason: DRUGHIGH

## 2019-09-25 ENCOUNTER — PATIENT MESSAGE (OUTPATIENT)
Dept: PSYCHIATRY | Facility: CLINIC | Age: 49
End: 2019-09-25

## 2019-09-26 ENCOUNTER — PATIENT MESSAGE (OUTPATIENT)
Dept: PSYCHIATRY | Facility: CLINIC | Age: 49
End: 2019-09-26

## 2019-09-26 RX ORDER — ESCITALOPRAM OXALATE 20 MG/1
20 TABLET ORAL DAILY
Qty: 30 TABLET | Refills: 1 | Status: SHIPPED | OUTPATIENT
Start: 2019-09-26 | End: 2019-10-29 | Stop reason: SDUPTHER

## 2019-10-03 DIAGNOSIS — R92.8 ABNORMAL MAMMOGRAM: ICD-10-CM

## 2019-10-03 DIAGNOSIS — Z12.31 VISIT FOR SCREENING MAMMOGRAM: Primary | ICD-10-CM

## 2019-10-10 ENCOUNTER — HOSPITAL ENCOUNTER (OUTPATIENT)
Dept: RADIOLOGY | Facility: HOSPITAL | Age: 49
Discharge: HOME | End: 2019-10-10
Attending: OBSTETRICS & GYNECOLOGY
Payer: COMMERCIAL

## 2019-10-10 DIAGNOSIS — Z12.31 VISIT FOR SCREENING MAMMOGRAM: ICD-10-CM

## 2019-10-10 PROCEDURE — 77063 BREAST TOMOSYNTHESIS BI: CPT | Mod: TC,PO

## 2019-10-16 ENCOUNTER — HOSPITAL ENCOUNTER (OUTPATIENT)
Dept: RADIOLOGY | Facility: HOSPITAL | Age: 49
Discharge: HOME | End: 2019-10-16
Attending: OBSTETRICS & GYNECOLOGY
Payer: COMMERCIAL

## 2019-10-16 DIAGNOSIS — R92.8 ABNORMAL MAMMOGRAM: ICD-10-CM

## 2019-10-16 PROCEDURE — 77065 DX MAMMO INCL CAD UNI: CPT | Mod: TC,PO,LT

## 2019-10-29 RX ORDER — ESCITALOPRAM OXALATE 20 MG/1
20 TABLET ORAL DAILY
Qty: 30 TABLET | Refills: 1 | Status: SHIPPED | OUTPATIENT
Start: 2019-10-29 | End: 2019-11-26

## 2019-10-29 RX ORDER — ESCITALOPRAM OXALATE 10 MG/1
TABLET ORAL
Qty: 30 TABLET | Refills: 2 | OUTPATIENT
Start: 2019-10-29

## 2019-11-26 ENCOUNTER — OFFICE VISIT (OUTPATIENT)
Dept: PSYCHIATRY | Facility: CLINIC | Age: 49
End: 2019-11-26
Payer: COMMERCIAL

## 2019-11-26 VITALS
WEIGHT: 140.44 LBS | SYSTOLIC BLOOD PRESSURE: 102 MMHG | BODY MASS INDEX: 23.4 KG/M2 | DIASTOLIC BLOOD PRESSURE: 72 MMHG | HEIGHT: 65 IN | HEART RATE: 67 BPM

## 2019-11-26 DIAGNOSIS — F41.1 GAD (GENERALIZED ANXIETY DISORDER): ICD-10-CM

## 2019-11-26 DIAGNOSIS — F32.5 MDD (MAJOR DEPRESSIVE DISORDER), SINGLE EPISODE, IN FULL REMISSION: ICD-10-CM

## 2019-11-26 PROCEDURE — 99999 PR PBB SHADOW E&M-EST. PATIENT-LVL III: CPT | Mod: PBBFAC,,, | Performed by: PSYCHIATRY & NEUROLOGY

## 2019-11-26 PROCEDURE — 99214 PR OFFICE/OUTPT VISIT, EST, LEVL IV, 30-39 MIN: ICD-10-PCS | Mod: S$GLB,,, | Performed by: PSYCHIATRY & NEUROLOGY

## 2019-11-26 PROCEDURE — 3008F PR BODY MASS INDEX (BMI) DOCUMENTED: ICD-10-PCS | Mod: CPTII,S$GLB,, | Performed by: PSYCHIATRY & NEUROLOGY

## 2019-11-26 PROCEDURE — 99999 PR PBB SHADOW E&M-EST. PATIENT-LVL III: ICD-10-PCS | Mod: PBBFAC,,, | Performed by: PSYCHIATRY & NEUROLOGY

## 2019-11-26 PROCEDURE — 3008F BODY MASS INDEX DOCD: CPT | Mod: CPTII,S$GLB,, | Performed by: PSYCHIATRY & NEUROLOGY

## 2019-11-26 PROCEDURE — 99214 OFFICE O/P EST MOD 30 MIN: CPT | Mod: S$GLB,,, | Performed by: PSYCHIATRY & NEUROLOGY

## 2019-11-26 RX ORDER — ESCITALOPRAM OXALATE 20 MG/1
TABLET ORAL
Qty: 1 TABLET | Refills: 0
Start: 2019-11-26 | End: 2020-01-02 | Stop reason: ALTCHOICE

## 2019-11-26 RX ORDER — CHOLECALCIFEROL (VITAMIN D3) 25 MCG
5000 TABLET ORAL DAILY
COMMUNITY

## 2019-11-26 RX ORDER — SERTRALINE HYDROCHLORIDE 50 MG/1
TABLET, FILM COATED ORAL
Qty: 30 TABLET | Refills: 0 | Status: SHIPPED | OUTPATIENT
Start: 2019-11-26 | End: 2019-12-23

## 2019-11-26 RX ORDER — LANOLIN ALCOHOL/MO/W.PET/CERES
2500 CREAM (GRAM) TOPICAL DAILY
COMMUNITY

## 2019-11-26 RX ORDER — ASCORBIC ACID 500 MG
500 TABLET ORAL DAILY
COMMUNITY

## 2019-11-26 RX ORDER — HALOBETASOL PROPIONATE AND TAZAROTENE .1; .45 MG/G; MG/G
LOTION TOPICAL
COMMUNITY
Start: 2019-09-30 | End: 2020-08-04

## 2019-11-26 NOTE — PROGRESS NOTES
Outpatient Psychiatry Follow Up Visit (MD/NP)    11/26/2019    Gaby Álvarez, a 49 y.o. female, presenting for follow up visit. Met with patient alone     Reason for Encounter: self-referral. Patient complains of anxiety, depression.     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  I have not met patient before and do not feel this is a conflict of information.     Pt reports hx of a vestibular disorder, called superior canal dehiscence which is currently being evaluated.  She recently changed providers to a Neurotologist.in [place], Dr Zavaleta.  Pt reports symptoms of intense vertigo/dizziness which initially started 4/2017.  She reports symptoms were abrupt, unable to ambulate, was in bed for weeks, prescribed betahistine with some improvement. She tried injections first but did not tolerate it and it caused high anxiety.  Pt reports her previous Neurologist Dr Rosa had recommended that she have a craniotomy which has caused her considerable stress.   Previous tx with Clonazepam 0.25 mg nightly, but did not like how she felt on it.  Dr Zavaleta prescribed Lexapro 10 mg daily (same day as intake).     Pt reports this medical stressor has occurred along with problems with her 24 yo daughter (not getting along with her father), her son graduating from high school.   She has taught x 23 years and she reports an incident occurred at school in the month of May which has caused her significant distress. She realized that during [employer] testing, there was something written on the wall that needed to be flipped over so the student would not have the information during the testing.   Pt did flip the info over within a few minutes, but was very bothered that she had done this so she admitted her wrong doing to her principal.  Pt reports she ended up being  written up - the first time this has ever happened to her which has been very distressing for her.  Additionally, she reports there was a meeting at school with the student and other team members and she was so concerned they would talk about her, that she decided to record the conversation on her phone.  She realized later that nothing actually did record on her phone, but she has lived in intense fear that she may lose her job.  She also feels her coworkers are treating her differently, and she is upset that no one has asked her how she is doing.   She denies hx of davina.     Pt denies hx of violence, no HI.   She denies hx of auditory/visual hallucinations. Pt is paranoid that coworkers are plotting against her and talking about her. She does feel friends and coworkers are talking about her and are plotting together.    Past Psychiatric History:  Prior diagnosis:  none  Inpatient psychiatric tx: none   Outpatient psychiatric tx:  None     Prior medications:   2005 prescribed something x 2 days - Cymbalta or Wellbutrin - sick to stomach, did not take it PCP  Left work due to anxiety in 2010 - mother ill at time   Since under my care:  Abilify - weaned off     Prior suicide attempts: none  Prior hx self harm: none   Prior psychotherapy: none  Prior psychological testing:  None       Past medical history:  Superior canal dehiscence   Vertigo 4/25/18 - MRI, CT scan, vestibular   Had worst panic attack ever with DH Maneuver     Social History:  Childhood: grew up in [place], 2 brother, middle child, 10 months older than younger brother, close to older brother, lost both parents 2010 mom, dad 11 months later   Marital status:    Resides: lives with  of 27 yrs, 25 yo daughter (), 19 yo - [employer], great kid, honor roll   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  She previously weaned off of Abilify, Xanax.   Has returned to school - difficult.  Relationships feels superficial, feels people treat her differently. Sits alone at lunch.  Does not trust anyone.  Saw Gyn in interim - was offered HRT which she declined. She tried getting off betahistine for SCD, has had a few spells with poor balance, no falls, lasts for a blink of an eye.  Now taking it once a day.  No follow up with Neuro in interim.    tells her she worries too much.  She and  follow keto diet - she has lost 20 lbs.  Doing well with this, there relationship is strengthened.   and daughter do not get along, she is in middle, feels very pressured. Daughter is immature, still lives with them.   She is having hard time relaxing.      20 mg daily and previously weaned off of Abilify. Rare Xanax (1/4 - 1/2 tab)  She is managing symptoms of Superior canal dehiscence with betahistine which she reduced to every other daily. Has not followed up yet with Dr Rosa but contacted his office to discuss med reduction. .SCD  Does not feel respected at work.  Enjoys working with children.  Work is "awkward."  In Feb, it will be one year in which she can retire.  Worries how she will get a reference from colleagues -  cannot believe that she has this concern. Some insight into how she is climbing the latter of inference (we discussed this previously).     Worries so much, some days can let things roll off of her back.   Usually open and honest, Wall up with others.   Feels great this week since she is off for the week (Thanksgiving break).       GAD7 11/24/2019   Feeling nervous, anxious, on edge Several days   Not being able to stop or control worrying Not at all   Worrying too much about different things Several days "   Trouble relaxing Not at all   Being so restless that its hard to sit still Not at all   Becoming easily annoyed or irritable Several days   Feeling afraid as if something awful might happen Not at all   If you marked you are experiencing any of the aforementioned problems, how difficult have these made it for you to do your work, take care of things at home, or get along with other people? Not difficult at all   IVONNE-7 Score 3     Trouble falling or staying asleep, or sleeping too much: (P) Not at all  Feeling tired or having little energy: (P) Several days  Poor appetite or overeating: (P) Not at all  Feeling bad about yourself - or that you are a failure or have let yourself or your family down: (P) Not at all  Trouble concentrating on things, such as reading the newspaper or watching television: (P) Not at all  Moving or speaking so slowly that other people could have noticed. Or the opposite - being so fidgety or restless that you have been moving around a lot more than usual: (P) Not at all  Thoughts that you would be better off dead, or of hurting yourself in some way: (P) Not at all  PHQ-9 Total Score: (P) 2  If you checked off any problems, how difficult have these problems made it for you to do your work, take care of things at home, or get along with other people?: (P) Not difficult at all      Pt does not feel depressed.  No anhedonia.  Denies suicidal/homicidal ideations. Denies symptoms of davina/psychosis. No significant paranoia.  Denies hopelessness/worthlessness.  No self harm or violence.     Pt continues to report peeling on tips of fingers which is now involving most fingers bilaterally, her ankle/backs of her heels, and achilles area, and has small patches on elbows, over eyelids. Sometimes she picks the skin.  It first involved her thumb, first finger but has spread to other fingers and palms.  Pt saw dermatology PA and was dx with probable psoriasis and was given Rx of Eucrisa. She is also  "using Eucerin and Cereva.  The skin splits, cracks.  No blisters.  No systemic symptoms, no fever. She questions if could be med related - this is why she lowered betahistine - was told it could dry her skin.    Symptoms first started not long after she started lexapro     Rare wine - less on diet. No drug use `.    Medications:   Lexapro 10 mg daily   Betahistine every day    Review Of Systems:     GENERAL:  weight loss   CARDIOVASCULAR:  No tachycardia or chest pain  MUSCULOSKELETAL:  No pain or stiffness of the joints  NEUROLOGIC:  Fullness, off balance at times - vestibular symptoms are improved.   DERM: skin peeling, possible psoriasis vs eczema?   Current Evaluation:     Nutritional Screening: Considering the patient's height and weight, medications, medical history and preferences, should a referral be made to the dietitian? no    Constitutional  Vitals:  Most recent vital signs, dated more than 90 days prior to this appointment, were reviewed (not available)  Vitals:    11/26/19 1421   BP: 102/72   Pulse: 67   Weight: 63.7 kg (140 lb 6.9 oz)   Height: 5' 5" (1.651 m)        General:  age appropriate, normal weight, well nourished, well dressed, neatly groomed, thinner, good eye contact      Musculoskeletal  Muscle Strength/Tone:  no tremor   Gait & Station:  non-ataxic     Psychiatric  Speech:  no latency; no press   Mood & Affect:  "good today"   full    Thought Process:  Linear with questions    Associations:  No RAMAKRISHNA    Thought Content:  no suicidality, no homicidality, hallucinations: (auditory: no, visual: no), no paranoid ideations    Insight:  Fair    Judgement: Appropriate for circumstances    Orientation:  grossly intact, person, place, situation, time/date, day of week, month of year, year   Memory: intact for content of interview, memory >3 objects at five mins, able to remember recent events- no, able to remember remote events- yes   Language: grossly intact   Attention Span & Concentration:  able " to focus   Fund of Knowledge:  intact and appropriate to age and level of education, familiar with aspects of current personal life       Relevant Elements of Neurological Exam: normal gait    Functioning in Relationships:  Spouse/partner: supportive, encouraged her to get help  Peers: limited   Employers:  Will retire next year     Laboratory Data  No visits with results within 1 Month(s) from this visit.   Latest known visit with results is:   No results found for any previous visit.         Medications  Outpatient Encounter Medications as of 11/26/2019   Medication Sig Dispense Refill    betahistine HCl (BETAHISTINE, BULK, MISC) 1 capsule by Misc.(Non-Drug; Combo Route) route 2 (two) times daily. Its a compounded drug made into capsule 12 mg   Patient only taking one daily now      DUOBRII 0.01-0.045 % Lotn       escitalopram oxalate (LEXAPRO) 20 MG tablet Take 1 tablet (20 mg total) by mouth once daily. 30 tablet 1    ALPRAZolam (XANAX) 0.25 MG tablet Take 1 tablet (0.25 mg total) by mouth daily as needed for Anxiety. 20 tablet 0    EUCRISA 2 % Oint        No facility-administered encounter medications on file as of 11/26/2019.            Assessment - Diagnosis - Goals:     Impression: pt presents for intake following high anxiety/stress related to health and occupational stressors. Pt's symptoms are bordering on delusional.  Family has been very concerned and her sister in law reached out to schedule this appt for her.  Pt with limited progress with addition of Lexapro. Addition of Abilify last visits showing small benefit.  Now with titration of Lexapro and taking Abilify several months and taking medical leave of absence, the patient is improved. + wt gain on Abilify - request to wean off.  No significant decompensation off of Abilify.  Pt has returned to work, reports she is doing fairly well despite stressors of job. Work performance is good.  Plans to retire in 1.5 yrs, at her 28 yr donald. Requests to  reduce lexapro in case it is causing skin symptoms. Has seen derm - thought to be possibly related to psoriasis.     MDD, single episode, in full remission   Generalized Anxiety Disorder  Superior Canal Dehiscence   Possible Psoriasis vs Eczema     GAF: 60     Strengths and Liabilities: Strength: Patient accepts guidance/feedback, Strength: Patient is expressive/articulate., Strength: Patient is intelligent.    Treatment Goals:  Specify outcomes written in observable, behavioral terms:   Anxiety: acquiring relapse prevention skills, reducing negative automatic thoughts, reducing physical symptoms of anxiety and reducing time spent worrying (<30 minutes/day)  Depression: acquiring relapse prevention skills, increasing energy, increasing interest in usual activities and increasing motivation    Treatment Plan/Recommendations:   · Medication Management: The risks and benefits of medication were discussed with the patient.    - concern about ongoing derm issue which pt states temporally is related to when she started Lexapro.  Will begin to taper off of Lexapro: Reduce to 5 mg daily.  Start Sertraline 50 mg: Take one-half tablet by mouth daily for one week, then increase to one tablet by mouth daily. Contact me in 2 weeks - will likely titrate Sertraline and wean off of Lexapro at that time. Discussed risks of serotonin syndrome, GI upset.   - Pt instructed to go to ER with thoughts of harming self, others   - Call to report any worsening of symptoms or problems with the medication  - Psychotherapy is recommended; [place] [facility] [place]. She has not followed up   - pt advised to follow up with Dermatology, Neuro   - Discussed nonpharmacologic interventions for anxiety including regular exercise, healthy diet, practice of mindfulness, social relatedness  - pt will send via My Chart the dosages and list of all OTC supplements she is taking     Return to Clinic: about 2 months, with update in 2  weeks per My Chart or sooner PRN

## 2019-11-29 PROBLEM — F32.5 MDD (MAJOR DEPRESSIVE DISORDER), SINGLE EPISODE, IN FULL REMISSION: Status: ACTIVE | Noted: 2018-10-10

## 2019-12-02 ENCOUNTER — PATIENT MESSAGE (OUTPATIENT)
Dept: PSYCHIATRY | Facility: CLINIC | Age: 49
End: 2019-12-02

## 2019-12-22 ENCOUNTER — DOCUMENTATION ONLY (OUTPATIENT)
Dept: PSYCHIATRY | Facility: CLINIC | Age: 49
End: 2019-12-22

## 2019-12-23 RX ORDER — SERTRALINE HYDROCHLORIDE 50 MG/1
TABLET, FILM COATED ORAL
Qty: 30 TABLET | Refills: 0 | Status: SHIPPED | OUTPATIENT
Start: 2019-12-23 | End: 2020-01-02

## 2020-01-02 ENCOUNTER — PATIENT MESSAGE (OUTPATIENT)
Dept: PSYCHIATRY | Facility: CLINIC | Age: 50
End: 2020-01-02

## 2020-01-02 RX ORDER — SERTRALINE HYDROCHLORIDE 50 MG/1
TABLET, FILM COATED ORAL
Qty: 1 TABLET | Refills: 0
Start: 2020-01-02 | End: 2020-01-25

## 2020-01-25 RX ORDER — SERTRALINE HYDROCHLORIDE 50 MG/1
TABLET, FILM COATED ORAL
Qty: 30 TABLET | Refills: 2 | Status: SHIPPED | OUTPATIENT
Start: 2020-01-25 | End: 2020-02-27 | Stop reason: DRUGHIGH

## 2020-02-13 ENCOUNTER — PATIENT MESSAGE (OUTPATIENT)
Dept: PSYCHIATRY | Facility: CLINIC | Age: 50
End: 2020-02-13

## 2020-02-16 ENCOUNTER — PATIENT MESSAGE (OUTPATIENT)
Dept: PSYCHIATRY | Facility: CLINIC | Age: 50
End: 2020-02-16

## 2020-02-27 ENCOUNTER — OFFICE VISIT (OUTPATIENT)
Dept: PSYCHIATRY | Facility: CLINIC | Age: 50
End: 2020-02-27
Payer: COMMERCIAL

## 2020-02-27 VITALS
HEIGHT: 65 IN | SYSTOLIC BLOOD PRESSURE: 121 MMHG | BODY MASS INDEX: 23.98 KG/M2 | WEIGHT: 143.94 LBS | DIASTOLIC BLOOD PRESSURE: 84 MMHG | HEART RATE: 67 BPM

## 2020-02-27 DIAGNOSIS — F33.0 MDD (MAJOR DEPRESSIVE DISORDER), RECURRENT EPISODE, MILD: ICD-10-CM

## 2020-02-27 DIAGNOSIS — F41.1 GAD (GENERALIZED ANXIETY DISORDER): ICD-10-CM

## 2020-02-27 PROCEDURE — 99999 PR PBB SHADOW E&M-EST. PATIENT-LVL III: CPT | Mod: PBBFAC,,, | Performed by: PSYCHIATRY & NEUROLOGY

## 2020-02-27 PROCEDURE — 90833 PSYTX W PT W E/M 30 MIN: CPT | Mod: S$GLB,,, | Performed by: PSYCHIATRY & NEUROLOGY

## 2020-02-27 PROCEDURE — 99213 PR OFFICE/OUTPT VISIT, EST, LEVL III, 20-29 MIN: ICD-10-PCS | Mod: S$GLB,,, | Performed by: PSYCHIATRY & NEUROLOGY

## 2020-02-27 PROCEDURE — 3008F BODY MASS INDEX DOCD: CPT | Mod: CPTII,S$GLB,, | Performed by: PSYCHIATRY & NEUROLOGY

## 2020-02-27 PROCEDURE — 90833 PR PSYCHOTHERAPY W/PATIENT W/E&M, 30 MIN (ADD ON): ICD-10-PCS | Mod: S$GLB,,, | Performed by: PSYCHIATRY & NEUROLOGY

## 2020-02-27 PROCEDURE — 99999 PR PBB SHADOW E&M-EST. PATIENT-LVL III: ICD-10-PCS | Mod: PBBFAC,,, | Performed by: PSYCHIATRY & NEUROLOGY

## 2020-02-27 PROCEDURE — 3008F PR BODY MASS INDEX (BMI) DOCUMENTED: ICD-10-PCS | Mod: CPTII,S$GLB,, | Performed by: PSYCHIATRY & NEUROLOGY

## 2020-02-27 PROCEDURE — 99213 OFFICE O/P EST LOW 20 MIN: CPT | Mod: S$GLB,,, | Performed by: PSYCHIATRY & NEUROLOGY

## 2020-02-27 RX ORDER — SERTRALINE HYDROCHLORIDE 100 MG/1
100 TABLET, FILM COATED ORAL DAILY
Qty: 30 TABLET | Refills: 1 | Status: SHIPPED | OUTPATIENT
Start: 2020-02-27 | End: 2020-03-27

## 2020-02-27 RX ORDER — SERTRALINE HYDROCHLORIDE 50 MG/1
TABLET, FILM COATED ORAL
Qty: 1 TABLET | Refills: 0
Start: 2020-02-27 | End: 2020-03-27 | Stop reason: DRUGHIGH

## 2020-02-27 NOTE — PROGRESS NOTES
Outpatient Psychiatry Follow Up Visit (MD/NP)    2/27/2020    Gaby Álvarez, a 49 y.o. female, presenting for follow up visit. Met with patient alone     Reason for Encounter: self-referral. Patient complains of anxiety, depression.     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  I have not met patient before and do not feel this is a conflict of information.     Pt reports hx of a vestibular disorder, called superior canal dehiscence which is currently being evaluated.  She recently changed providers to a Neurotologist.in [place], Dr Zavaleta.  Pt reports symptoms of intense vertigo/dizziness which initially started 4/2017.  She reports symptoms were abrupt, unable to ambulate, was in bed for weeks, prescribed betahistine with some improvement. She tried injections first but did not tolerate it and it caused high anxiety.  Pt reports her previous Neurologist Dr Rosa had recommended that she have a craniotomy which has caused her considerable stress.   Previous tx with Clonazepam 0.25 mg nightly, but did not like how she felt on it.  Dr Zavaleta prescribed Lexapro 10 mg daily (same day as intake).     Pt reports this medical stressor has occurred along with problems with her 22 yo daughter (not getting along with her father), her son graduating from high school.   She has taught x 23 years and she reports an incident occurred at school in the month of May which has caused her significant distress. She realized that during LEAP testing, there was something written on the wall that needed to be flipped over so the student would not have the information during the testing.   Pt did flip the info over within a few minutes, but was very bothered that she had done this so she admitted her wrong doing to her principal.  Pt reports she ended up being  written up - the first time this has ever happened to her which has been very distressing for her.  Additionally, she reports there was a meeting at school with the student and other team members and she was so concerned they would talk about her, that she decided to record the conversation on her phone.  She realized later that nothing actually did record on her phone, but she has lived in intense fear that she may lose her job.  She also feels her coworkers are treating her differently, and she is upset that no one has asked her how she is doing.   She denies hx of davina.     Pt denies hx of violence, no HI.   She denies hx of auditory/visual hallucinations. Pt is paranoid that coworkers are plotting against her and talking about her. She does feel friends and coworkers are talking about her and are plotting together.    Past Psychiatric History:  Prior diagnosis:  none  Inpatient psychiatric tx: none   Outpatient psychiatric tx:  None     Prior medications:   2005 prescribed something x 2 days - Cymbalta or Wellbutrin - sick to stomach, did not take it PCP  Left work due to anxiety in 2010 - mother ill at time   Since under my care:  Abilify - weaned off     Prior suicide attempts: none  Prior hx self harm: none   Prior psychotherapy: none  Prior psychological testing:  None       Past medical history:  Superior canal dehiscence   Vertigo 4/25/18 - MRI, CT scan, vestibular   Had worst panic attack ever with DH Maneuver     Social History:  Childhood: grew up in [place], 2 brother, middle child, 10 months older than younger brother, close to older brother, lost both parents 2010 mom, dad 11 months later   Marital status:    Resides: lives with  of 27 yrs, 25 yo daughter (), 17 yo - [employer], great kid, honor roll   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  Rash on fingers, elbows persists.  Pt seen by Derm yesterday and dx with psoriasis.  Likely worsened by stress.     Pt reports she was taking only Sertraline 25 mg daily in January and noted and dip in mood with inc'd crying spells.  She has titrated to 50 mg daily and is feeling a little better.  She is having a hard time at school.  Pt focused on this today.  Does not feel respected by other teachers/admin.  No longer feels she has a work family.  Plans to retire 1/21.   Getting weird vibes from others. Suspicious, mildly paranoid. Fear her reputation will be tarnished.  + guilt, very worried about mistake she made (realized she does not have paper copy of student's IEP).   Feels worthless, hopeless at times.  Depressed at times, but worry > sadness.  No panic attacks.  Denies trouble relaxing.   Has not been exercising as much with weather, but plans to start walking.  Was following keto diet, has been off track, but resuming.   Denies suicidal/homicidal ideations.  Denies symptoms of davina/psychosis.  No self harm or violence.   Ready to move on.    Internalizing a lot.  Not sharing with  as much. No issues with students/teaching.   Feels so far removed.    "Worried, fixating, will I lose my job?"   Recent realization about student's IEP sent her into another worry phase, money, college.   Past week, depressed, did nothing, felt tired.  Having difficulty sleeping.   Last pm - went to sleep at 5 am.     Saw Dermatologist in [place] yesterday- dx psoriasis, possibly brought on by stress.  Got steroid shot yesterday at 4 pm. Read all night, impacted her sleep.   Sleeping a little more than usual.   Worries what others think of her, her reputation,  No one has her back.   Straight 4s on " evaluations.  Guarded around others, but forces self to be very polite, professional,     Using Betahistine as PRN.  Feels off balance at times.  No falls.     Trouble falling or staying asleep, or sleeping too much: (P) Several days  Feeling tired or having little energy: (P) Not at all  Poor appetite or overeating: (P) Several days  Feeling bad about yourself - or that you are a failure or have let yourself or your family down: (P) Several days  Trouble concentrating on things, such as reading the newspaper or watching television: (P) Not at all  Moving or speaking so slowly that other people could have noticed. Or the opposite - being so fidgety or restless that you have been moving around a lot more than usual: (P) Not at all  Thoughts that you would be better off dead, or of hurting yourself in some way: (P) Not at all  PHQ-9 Total Score: (P) 4  If you checked off any problems, how difficult have these problems made it for you to do your work, take care of things at home, or get along with other people?: (P) Not difficult at all      GAD7 2/26/2020   1. Feeling nervous, anxious, or on edge? 2   2. Not being able to stop or control worrying? 1   3. Worrying too much about different things? 1   4. Trouble relaxing? 0   5. Being so restless that it is hard to sit still? 0   6. Becoming easily annoyed or irritable? 1   7. Feeling afraid as if something awful might happen? 1   8. If you checked off any problems, how difficult have these problems made it for you to do your work, take care of things at home, or get along with other people? 1   IVONNE-7 Score 6   Rare wine - less on diet. No drug use    Psychotherapy:   · Target symptoms: depression, anxiety  · Why chosen therapy is appropriate versus another modality: relevant to diagnosis, patient responds to this modality  · Outcome monitoring methods: self-report, observation  · Therapeutic intervention type: supportive psychotherapy, brief CBT  · Topics  "discussed/themes: building skills sets for symptom management, symptom recognition, nutrition, exercise  · The patient's response to the intervention is accepting. The patient's progress toward treatment goals is limited progress.  · Duration of intervention: 20 minutes    Medications:   Sertraline 50 mg daily   Betahistine only PRN use     Review Of Systems:     GENERAL:  weight gain 3 lbs   CARDIOVASCULAR:  No tachycardia or chest pain  MUSCULOSKELETAL:  No pain or stiffness of the joints  NEUROLOGIC:  Fullness, off balance at times - vestibular symptoms are improved.   DERM: psoriasis   Current Evaluation:     Nutritional Screening: Considering the patient's height and weight, medications, medical history and preferences, should a referral be made to the dietitian? no    Constitutional  Vitals:  Most recent vital signs, dated more than 90 days prior to this appointment, were reviewed (not available)       General:  age appropriate, normal weight, well nourished, well dressed, neatly groomed, good eye contact      Musculoskeletal  Muscle Strength/Tone:  no tremor   Gait & Station:  non-ataxic     Psychiatric  Speech:  no latency; no press   Mood & Affect:  "down recently"   Restricted    Thought Process:  Linear with questions    Associations:  No RAMAKRISHNA    Thought Content:  no suicidality, no homicidality, hallucinations: (auditory: no, visual: no), mild paranoid ideations    Insight:  Fair    Judgement: Appropriate for circumstances    Orientation:  grossly intact, person, place, situation, time/date, day of week, month of year, year   Memory: intact for content of interview, able to remember recent events- no, able to remember remote events- yes   Language: grossly intact   Attention Span & Concentration:  able to focus   Fund of Knowledge:  intact and appropriate to age and level of education, familiar with aspects of current personal life       Relevant Elements of Neurological Exam: normal gait    Functioning " in Relationships:  Spouse/partner: supportive, encouraged her to get help  Peers: limited   Employers:  Will retire next year     Laboratory Data  No visits with results within 1 Month(s) from this visit.   Latest known visit with results is:   No results found for any previous visit.         Medications  Outpatient Encounter Medications as of 2/27/2020   Medication Sig Dispense Refill    ascorbic acid, vitamin C, (VITAMIN C) 500 MG tablet Take 500 mg by mouth once daily.      betahistine HCl (BETAHISTINE, BULK, MISC) 1 capsule by Misc.(Non-Drug; Combo Route) route 2 (two) times daily. Its a compounded drug made into capsule 12 mg   Patient only taking one daily now      cyanocobalamin (VITAMIN B-12) 1000 MCG tablet Take 2,500 mcg by mouth once daily.      DUOBRII 0.01-0.045 % Lotn       EUCRISA 2 % Oint       sertraline (ZOLOFT) 50 MG tablet TAKE 1 TABLET BY MOUTH EVERY DAY 30 tablet 2    vitamin D (VITAMIN D3) 1000 units Tab Take 5,000 Units by mouth once daily.       No facility-administered encounter medications on file as of 2/27/2020.            Assessment - Diagnosis - Goals:     Impression: pt presents for intake following high anxiety/stress related to health and occupational stressors. Pt's symptoms are bordering on delusional.  Family has been very concerned and her sister in law reached out to schedule this appt for her.  Pt with limited progress with addition of Lexapro. Addition of Abilify last visits showing small benefit.  Now with titration of Lexapro and taking Abilify several months and taking medical leave of absence, the patient is improved. + wt gain on Abilify - request to wean off.  No significant decompensation off of Abilify.  Pt has returned to work, reports she is doing fairly well despite stressors of job. Work performance is good.  Plans to retire in 1.5 yrs, at her 28 yr donald. Requests to reduce lexapro in case it is causing skin symptoms. Has seen derm - dx with psoriasis,  probably stress related.     MDD, single episode, mild   Generalized Anxiety Disorder  Superior Canal Dehiscence, Psoriasis    GAF: 58   Strengths and Liabilities: Strength: Patient accepts guidance/feedback, Strength: Patient is expressive/articulate., Strength: Patient is intelligent.    Treatment Goals:  Specify outcomes written in observable, behavioral terms:   Anxiety: acquiring relapse prevention skills, reducing negative automatic thoughts, reducing physical symptoms of anxiety and reducing time spent worrying (<30 minutes/day)  Depression: acquiring relapse prevention skills, increasing energy, increasing interest in usual activities and increasing motivation    Treatment Plan/Recommendations:   · Medication Management: The risks and benefits of medication were discussed with the patient.    - Titrate Sertraline to 75 mg daily until complete current RX, then titrate to 100 mg daily.   - Pt instructed to go to ER with thoughts of harming self, others   - Call to report any worsening of symptoms or problems with the medication  - Psychotherapy is recommended; discussed seeing LCS[facility] at [facility] - contact # provided. She has not followed up   - Discussed nonpharmacologic interventions for anxiety including regular exercise, healthy diet, practice of mindfulness, social relatedness    Return to Clinic: about 2 months

## 2020-03-01 PROBLEM — F33.0 MDD (MAJOR DEPRESSIVE DISORDER), RECURRENT EPISODE, MILD: Status: ACTIVE | Noted: 2020-03-01

## 2020-03-27 RX ORDER — SERTRALINE HYDROCHLORIDE 100 MG/1
TABLET, FILM COATED ORAL
Qty: 90 TABLET | Refills: 0 | Status: SHIPPED | OUTPATIENT
Start: 2020-03-27 | End: 2020-06-03 | Stop reason: SDUPTHER

## 2020-05-10 RX ORDER — SERTRALINE HYDROCHLORIDE 50 MG/1
TABLET, FILM COATED ORAL
Qty: 90 TABLET | Refills: 0 | OUTPATIENT
Start: 2020-05-10

## 2020-06-03 ENCOUNTER — OFFICE VISIT (OUTPATIENT)
Dept: PSYCHIATRY | Facility: CLINIC | Age: 50
End: 2020-06-03
Payer: COMMERCIAL

## 2020-06-03 DIAGNOSIS — F33.41 MDD (MAJOR DEPRESSIVE DISORDER), RECURRENT, IN PARTIAL REMISSION: ICD-10-CM

## 2020-06-03 DIAGNOSIS — F41.1 GAD (GENERALIZED ANXIETY DISORDER): ICD-10-CM

## 2020-06-03 PROCEDURE — 90833 PR PSYCHOTHERAPY W/PATIENT W/E&M, 30 MIN (ADD ON): ICD-10-PCS | Mod: 95,,, | Performed by: PSYCHIATRY & NEUROLOGY

## 2020-06-03 PROCEDURE — 99213 PR OFFICE/OUTPT VISIT, EST, LEVL III, 20-29 MIN: ICD-10-PCS | Mod: 95,,, | Performed by: PSYCHIATRY & NEUROLOGY

## 2020-06-03 PROCEDURE — 99213 OFFICE O/P EST LOW 20 MIN: CPT | Mod: 95,,, | Performed by: PSYCHIATRY & NEUROLOGY

## 2020-06-03 PROCEDURE — 90833 PSYTX W PT W E/M 30 MIN: CPT | Mod: 95,,, | Performed by: PSYCHIATRY & NEUROLOGY

## 2020-06-03 RX ORDER — SERTRALINE HYDROCHLORIDE 100 MG/1
TABLET, FILM COATED ORAL
Qty: 45 TABLET | Refills: 0 | Status: SHIPPED | OUTPATIENT
Start: 2020-06-03 | End: 2020-07-02

## 2020-06-03 NOTE — PROGRESS NOTES
Outpatient Psychiatry Follow Up Visit (MD/NP)      The patient location is: at home  The chief complaint leading to consultation is: medication management    Visit type: audiovisual    Face to Face time with patient:  30 min  Thirty minutes of total time spent on the encounter, which includes face to face time and non-face to face time preparing to see the patient (eg, review of tests), Obtaining and/or reviewing separately obtained history, Documenting clinical information in the electronic or other health record, Independently interpreting results (not separately reported) and communicating results to the patient/family/caregiver, or Care coordination (not separately reported).         Each patient to whom he or she provides medical services by telemedicine is:  (1) informed of the relationship between the physician and patient and the respective role of any other health care provider with respect to management of the patient; and (2) notified that he or she may decline to receive medical services by telemedicine and may withdraw from such care at any time.      6/3/2020    Gaby Álvarez, a 50 y.o. female, presenting for follow up visit. Met with patient alone     Reason for Encounter: self-referral. Patient complains of anxiety, depression.     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 Previous tx with Clonazepam 0.25 mg nightly, but did not like how she felt on it.  Dr Zavaleta prescribed Lexapro 10 mg daily (same day as intake).     Pt reports this medical stressor has occurred along with problems with her 24 yo daughter (not getting along with her father), her son graduating from high school.   She has taught x 23 years and she reports an incident occurred at school in the month of May which has caused her significant  distress. She realized that during LEAP testing, there was something written on the wall that needed to be flipped over so the student would not have the information during the testing.   Pt did flip the info over within a few minutes, but was very bothered that she had done this so she admitted her wrong doing to her principal.  Pt reports she ended up being written up - the first time this has ever happened to her which has been very distressing for her.  Additionally, she reports there was a meeting at school with the student and other team members and she was so concerned they would talk about her, that she decided to record the conversation on her phone.  She realized later that nothing actually did record on her phone, but she has lived in intense fear that she may lose her job.  She also feels her coworkers are treating her differently, and she is upset that no one has asked her how she is doing.   She denies hx of davina.   She denies hx of auditory/visual hallucinations. Pt is paranoid that coworkers are plotting against her and talking about her. She does feel friends and coworkers are talking about her and are plotting together.    Past Psychiatric History:  Prior diagnosis:  none  Inpatient psychiatric tx: none   Outpatient psychiatric tx:  None     Prior medications:   2005 prescribed something x 2 days - Cymbalta or Wellbutrin - sick to stomach, did not take it PCP  Left work due to anxiety in 2010 - mother ill at time   Since under my care:  Abilify - weaned off     Prior suicide attempts: none  Prior hx self harm: none   Prior psychotherapy: none  Prior psychological testing:  None       Past medical history:  Superior canal dehiscence   Vertigo 4/25/18 - MRI, CT scan, vestibular   Psoriasis    INTERIM HISTORY:   Pt seen via telemedicine due to COVID-19 public health crisis.      Med changes last visit:  Sertraline titrated to 100 mg daily.  Pt seen by Derm and dx with psoriasis.  Likely worsened by  stress.  Much improved since she stopped beta his teen, had a trial of steroids and topical lotion.  Vertigo is improved.    The patient has been working from home since March due to the pandemic.  She did engage her students in online learning which was successful.  She has been struggling with feeling alone.  She no longer feels connected to other teachers.  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    Psych ROS:  She endorses mildly depressed mood, chronic worry, chronic feelings of shame and worthlessness.  She chronically feels like she has done something wrong and that her coworkers will learn the truth about her.  She is grieving the loss of relationships with her fellow teachers.  Instead of feeling excited about intermediate, she feels other people think she is trying to retire just for the money after 30 yrs.  No recent panic attacks. Denies suicidal/homicidal ideations.'Pt denies auditory/visual hallucinations.  She endorses mild paranoia, able to do some reality testing.  She has been sleeping well.  Appetite is stable.  Walks daily with her , gets 10,000 steps a day on her wearable device.  Anxiety has caused conflicts with her .  Does not feel like she can open up with her friends or family about what is troubling her.    Jorge Luis 7:5.    Rare wine - less on diet. No drug use    Psychotherapy:   · Target symptoms: depression, anxiety  · Why chosen therapy is appropriate versus another modality: relevant to diagnosis, patient responds to this modality  · Outcome monitoring methods: self-report, observation  · Therapeutic intervention type: supportive psychotherapy, brief CBT  · Topics discussed/themes: building skills sets for symptom management, symptom recognition, nutrition, exercise  · The patient's response to the intervention is accepting. The patient's progress toward treatment goals is fair progress.  · Duration of intervention: 20  minutes    Medications:   Sertraline 100 mg daily    Review Of Systems:     GENERAL:  weight gain   CARDIOVASCULAR:  No tachycardia or chest pain  MUSCULOSKELETAL:  No pain or stiffness of the joints  NEUROLOGIC:  Fullness, off balance at times - vestibular symptoms are improved.   DERM: psoriasis improved  Current Evaluation:     Nutritional Screening: Considering the patient's height and weight, medications, medical history and preferences, should a referral be made to the dietitian? no    Constitutional  Vitals:  Most recent vital signs, dated more than 90 days prior to this appointment, were reviewed (not available)       General:  age appropriate, normal weight, well nourished, well dressed, neatly groomed, good eye contact      Musculoskeletal  Muscle Strength/Tone:  no tremor   Gait & Station:  Not able to assess virtually     Psychiatric  Speech:  no latency; no press   Mood & Affect:  worried  Restricted    Thought Process:  Linear with questions    Associations:  No RAMAKRISHNA    Thought Content:  no suicidality, no homicidality, hallucinations: (auditory: no, visual: no), mild paranoid ideations    Insight:  Fair    Judgement: Appropriate for circumstances    Orientation:  grossly intact, person, place, situation, time/date, day of week, month of year, year   Memory: intact for content of interview, able to remember recent events- no, able to remember remote events- yes   Language: grossly intact   Attention Span & Concentration:  able to focus   Fund of Knowledge:  intact and appropriate to age and level of education, familiar with aspects of current personal life       Functioning in Relationships:  Spouse/partner: supportive, encouraged her to get help  Peers: limited   Employers:  Will retire next year     Laboratory Data  No visits with results within 1 Month(s) from this visit.   Latest known visit with results is:   No results found for any previous visit.         Medications  Outpatient Encounter  Medications as of 6/3/2020   Medication Sig Dispense Refill    ascorbic acid, vitamin C, (VITAMIN C) 500 MG tablet Take 500 mg by mouth once daily.      betahistine HCl (BETAHISTINE, BULK, MISC) 1 capsule by Misc.(Non-Drug; Combo Route) route 2 (two) times daily. Its a compounded drug made into capsule 12 mg   Patient only taking one daily now      cyanocobalamin (VITAMIN B-12) 1000 MCG tablet Take 2,500 mcg by mouth once daily.      DUOBRII 0.01-0.045 % Lotn       sertraline (ZOLOFT) 100 MG tablet TAKE 1 TABLET BY MOUTH EVERY DAY 90 tablet 0    vitamin D (VITAMIN D3) 1000 units Tab Take 5,000 Units by mouth once daily.       No facility-administered encounter medications on file as of 6/3/2020.            Assessment - Diagnosis - Goals:     Impression: pt presents for intake following high anxiety/stress related to health and occupational stressors. Pt's symptoms are bordering on delusional.  Family has been very concerned and her sister in law reached out to schedule this appt for her.  Pt with limited progress with addition of Lexapro. Addition of Abilify last visits showing small benefit.  Now with titration of Lexapro and taking Abilify several months and taking medical leave of absence, the patient is improved. + wt gain on Abilify - request to wean off.  No significant decompensation off of Abilify.  Pt has returned to work, reports she is doing fairly well despite stressors of job. Work performance is good.  Plans to retire in 1.5 yrs, at her 28 yr donald. Requests to reduce lexapro in case it is causing skin symptoms. Has seen derm - dx with psoriasis, probably stress related.     MDD, single episode,in part remission  Generalized Anxiety Disorder  Superior Canal Dehiscence, Psoriasis    GAF: 58   Strengths and Liabilities: Strength: Patient accepts guidance/feedback, Strength: Patient is expressive/articulate., Strength: Patient is intelligent.    Treatment Goals:  Specify outcomes written in  observable, behavioral terms:   Anxiety: acquiring relapse prevention skills, reducing negative automatic thoughts, reducing physical symptoms of anxiety and reducing time spent worrying (<30 minutes/day)  Depression: acquiring relapse prevention skills, increasing energy, increasing interest in usual activities and increasing motivation    Treatment Plan/Recommendations:   · Medication Management: The risks and benefits of medication were discussed with the patient.    - Titrate Sertraline to 150 mg daily.  Patient will use home supply.  - Pt instructed to go to ER with thoughts of harming self, others   - Call to report any worsening of symptoms or problems with the medication  - Psychotherapy is strongly recommended; discussed seeing provider at [facility] virtually  - Discussed nonpharmacologic interventions for anxiety including regular exercise, healthy diet, practice of mindfulness, social relatedness    Return to Clinic: about 2 months virtually

## 2020-06-04 ENCOUNTER — TELEPHONE (OUTPATIENT)
Dept: PSYCHIATRY | Facility: CLINIC | Age: 50
End: 2020-06-04

## 2020-07-02 RX ORDER — SERTRALINE HYDROCHLORIDE 100 MG/1
TABLET, FILM COATED ORAL
Qty: 135 TABLET | Refills: 0 | Status: SHIPPED | OUTPATIENT
Start: 2020-07-02 | End: 2020-08-04 | Stop reason: SDUPTHER

## 2020-07-31 ENCOUNTER — PATIENT MESSAGE (OUTPATIENT)
Dept: PSYCHIATRY | Facility: CLINIC | Age: 50
End: 2020-07-31

## 2020-08-03 ENCOUNTER — PATIENT MESSAGE (OUTPATIENT)
Dept: PSYCHIATRY | Facility: CLINIC | Age: 50
End: 2020-08-03

## 2020-08-04 ENCOUNTER — OFFICE VISIT (OUTPATIENT)
Dept: PSYCHIATRY | Facility: CLINIC | Age: 50
End: 2020-08-04
Payer: COMMERCIAL

## 2020-08-04 DIAGNOSIS — F41.1 GAD (GENERALIZED ANXIETY DISORDER): ICD-10-CM

## 2020-08-04 DIAGNOSIS — F33.41 MDD (MAJOR DEPRESSIVE DISORDER), RECURRENT, IN PARTIAL REMISSION: ICD-10-CM

## 2020-08-04 PROCEDURE — 99213 OFFICE O/P EST LOW 20 MIN: CPT | Mod: 95,,, | Performed by: PSYCHIATRY & NEUROLOGY

## 2020-08-04 PROCEDURE — 90833 PR PSYCHOTHERAPY W/PATIENT W/E&M, 30 MIN (ADD ON): ICD-10-PCS | Mod: 95,,, | Performed by: PSYCHIATRY & NEUROLOGY

## 2020-08-04 PROCEDURE — 90833 PSYTX W PT W E/M 30 MIN: CPT | Mod: 95,,, | Performed by: PSYCHIATRY & NEUROLOGY

## 2020-08-04 PROCEDURE — 99213 PR OFFICE/OUTPT VISIT, EST, LEVL III, 20-29 MIN: ICD-10-PCS | Mod: 95,,, | Performed by: PSYCHIATRY & NEUROLOGY

## 2020-08-04 RX ORDER — SERTRALINE HYDROCHLORIDE 100 MG/1
TABLET, FILM COATED ORAL
Qty: 180 TABLET | Refills: 0 | Status: SHIPPED | OUTPATIENT
Start: 2020-08-04 | End: 2020-10-04

## 2020-08-04 NOTE — PROGRESS NOTES
Outpatient Psychiatry Follow Up Visit (MD/NP)      The patient location is: at home, address on chart   The chief complaint leading to consultation is: medication management    Visit type: audiovisual    Face to Face time with patient:  30 min  Thirty minutes of total time spent on the encounter, which includes face to face time and non-face to face time preparing to see the patient (eg, review of tests), Obtaining and/or reviewing separately obtained history, Documenting clinical information in the electronic or other health record, Independently interpreting results (not separately reported) and communicating results to the patient/family/caregiver, or Care coordination (not separately reported).         Each patient to whom he or she provides medical services by telemedicine is:  (1) informed of the relationship between the physician and patient and the respective role of any other health care provider with respect to management of the patient; and (2) notified that he or she may decline to receive medical services by telemedicine and may withdraw from such care at any time.      8/4/2020    Gaby Nelson Preeti, a 50 y.o. female, presenting for follow up visit. Met with patient alone     Reason for Encounter: self-referral. Patient complains of anxiety, depression.     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 Previous tx with Clonazepam 0.25 mg nightly, but did not like how she felt on it.  Dr Zavaleta prescribed Lexapro 10 mg daily (same day as intake).     Pt reports this medical stressor has occurred along with problems with her 22 yo daughter (not getting along with her father), her son graduating from high school.   She has taught x 23 years and she reports an incident occurred at school in the month of May which has  caused her significant distress. She realized that during [employer] testing, there was something written on the wall that needed to be flipped over so the student would not have the information during the testing.   Pt did flip the info over within a few minutes, but was very bothered that she had done this so she admitted her wrong doing to her principal.  Pt reports she ended up being written up - the first time this has ever happened to her which has been very distressing for her.  Additionally, she reports there was a meeting at school with the student and other team members and she was so concerned they would talk about her, that she decided to record the conversation on her phone.  She realized later that nothing actually did record on her phone, but she has lived in intense fear that she may lose her job.  She also feels her coworkers are treating her differently, and she is upset that no one has asked her how she is doing.   She denies hx of davina.   She denies hx of auditory/visual hallucinations. Pt is paranoid that coworkers are plotting against her and talking about her. She does feel friends and coworkers are talking about her and are plotting together.    Past Psychiatric History:  Prior diagnosis:  none  Inpatient psychiatric tx: none   Outpatient psychiatric tx:  None     Prior medications:   2005 prescribed something x 2 days - Cymbalta or Wellbutrin - sick to stomach, did not take it PCP  Left work due to anxiety in 2010 - mother ill at time   Since under my care:  Abilify - weaned off     Prior suicide attempts: none  Prior hx self harm: none   Prior psychotherapy: none  Prior psychological testing:  None       Past medical history:  Superior canal dehiscence   Vertigo 4/25/18 - MRI, CT scan, vestibular   Psoriasis    INTERIM HISTORY:   Pt seen via telemedicine due to COVID-19 public health crisis.      Med changes last visit:  Sertraline titrated to 150 mg daily.  Pt seen by Derm and dx with  "psoriasis.  Likely worsened by stress.  She has had an exacerbation since returning to school.  She no longer feels connected to other teachers.  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  Patient has struggled accepting the loss of these relationships.  She will retire in February, but probably use of her leave in January.  Her alf is very sweet, sad that is is ending this way.  Patient spends majority of the appointment discussing how some of the teachers are very  with her, they make statements that imply that they know the mistakes she made previously.  She still does not feel like she can move be on those mistakes without confessing them to her peers.  This was processed again with the patient, no harm was done.  She can accept that, but has having difficulty moving on.  Example:  She overheard one teacher saying that she had "big balls" to return, another told her that she thought she was retiring this year (with a knowing facial expression per patient).   Nice being home - she has now returned on campus.  Her son Bhargav is returning to college this weekend.  Patient has been trying to keep busy with the [employer], art projects.  She is struggling with the feeling that she has done something wrong, guilt, and everyone knows it.    Psoriasis has flared up on her fingers.  Sleeping great     During quarantine she gained 20 lbs - may return to keto diet.  She felt well on this.  Not really depressed.   Using prayer liana which helps her to manage anxiety.    She endorses chronic worry, chronic feelings of shame and worthlessness.  She chronically feels like she has done something wrong and that her coworkers will learn the truth about her.  She is grieving the loss of relationships with her fellow teachers.  Instead of feeling excited about alf, she feels other people think she is trying to retire just for the money after 30 yrs.  No recent panic " attacks. Denies suicidal/homicidal ideations.'Pt denies auditory/visual hallucinations.  She endorses mild paranoia, able to do some reality testing.    Rare wine - infrequent     Psychotherapy:   · Target symptoms: depression, anxiety  · Why chosen therapy is appropriate versus another modality: relevant to diagnosis, patient responds to this modality  · Outcome monitoring methods: self-report, observation  · Therapeutic intervention type: supportive psychotherapy, brief CBT  · Topics discussed/themes: building skills sets for symptom management, symptom recognition, nutrition, exercise  · The patient's response to the intervention is accepting. The patient's progress toward treatment goals is fair progress.  · Duration of intervention: 20 minutes    Medications:   Sertraline 150  mg daily    Review Of Systems:     GENERAL:  weight gain   CARDIOVASCULAR:  No tachycardia or chest pain  MUSCULOSKELETAL:  No pain or stiffness of the joints  NEUROLOGIC:  Fullness, off balance at times - vestibular symptoms are improved.   DERM: psoriasis has flared up   Current Evaluation:     Nutritional Screening: Considering the patient's height and weight, medications, medical history and preferences, should a referral be made to the dietitian? no    Constitutional  Vitals:  Most recent vital signs, dated more than 90 days prior to this appointment, were reviewed (not available)       General:  age appropriate, normal weight, well nourished, well dressed, neatly groomed, good eye contact      Musculoskeletal  Muscle Strength/Tone:  no tremor   Gait & Station:  Not able to assess virtually     Psychiatric  Speech:  no latency; no press   Mood & Affect:  Stressed   Full     Thought Process:  Linear with questions    Associations:  No RAMAKRISHNA    Thought Content:  no suicidality, no homicidality, hallucinations: (auditory: no, visual: no), mild paranoid ideations    Insight:  Fair    Judgement: Appropriate for circumstances    Orientation:   grossly intact, person, place, situation, time/date, day of week, month of year, year   Memory: intact for content of interview, able to remember recent events- no, able to remember remote events- yes   Language: grossly intact   Attention Span & Concentration:  able to focus   Fund of Knowledge:  intact and appropriate to age and level of education, familiar with aspects of current personal life       Functioning in Relationships:  Spouse/partner: supportive, encouraged her to get help  Peers: limited   Employers:  Will retire next year     Laboratory Data  No visits with results within 1 Month(s) from this visit.   Latest known visit with results is:   No results found for any previous visit.         Medications  Outpatient Encounter Medications as of 8/4/2020   Medication Sig Dispense Refill    ascorbic acid, vitamin C, (VITAMIN C) 500 MG tablet Take 500 mg by mouth once daily.      cyanocobalamin (VITAMIN B-12) 1000 MCG tablet Take 2,500 mcg by mouth once daily.      DUOBRII 0.01-0.045 % Lotn       sertraline (ZOLOFT) 100 MG tablet TAKE 1.5 TABS BY MOUTH DAILY 135 tablet 0    vitamin D (VITAMIN D3) 1000 units Tab Take 5,000 Units by mouth once daily.       No facility-administered encounter medications on file as of 8/4/2020.            Assessment - Diagnosis - Goals:     Impression: pt presents for intake following high anxiety/stress related to health and occupational stressors. Pt's symptoms are bordering on delusional.  Family has been very concerned and her sister in law reached out to schedule this appt for her.  Pt with limited progress with addition of Lexapro. Addition of Abilify last visits showing small benefit.  Now with titration of Lexapro and taking Abilify several months and taking medical leave of absence, the patient is improved. + wt gain on Abilify - request to wean off.  No significant decompensation off of Abilify.  Pt has returned to work, reports she is doing fairly well despite  stressors of job. Work performance is good.  Plans to retire in 1.5 yrs, at her 28 yr donald. Requests to reduce lexapro in case it is causing skin symptoms. Has seen derm - dx with psoriasis, probably stress related.     MDD, single episode,in part remission  Generalized Anxiety Disorder  Superior Canal Dehiscence, Psoriasis    GAF: 58   Strengths and Liabilities: Strength: Patient accepts guidance/feedback, Strength: Patient is expressive/articulate., Strength: Patient is intelligent.    Treatment Goals:  Specify outcomes written in observable, behavioral terms:   Anxiety: acquiring relapse prevention skills, reducing negative automatic thoughts, reducing physical symptoms of anxiety and reducing time spent worrying (<30 minutes/day)  Depression: acquiring relapse prevention skills, increasing energy, increasing interest in usual activities and increasing motivation    Treatment Plan/Recommendations:   · Medication Management: The risks and benefits of medication were discussed with the patient.    - Titrate Sertraline to 200 mg daily to target anxiety  - Pt instructed to go to ER with thoughts of harming self, others   - Call to report any worsening of symptoms or problems with the medication  - Psychotherapy is strongly recommended; discussed seeing provider at [facility] virtually  - Discussed nonpharmacologic interventions for anxiety including regular exercise, healthy diet, practice of mindfulness, social relatedness    Return to Clinic: about 2 months virtually    This note was created using [employer] English voice recognition software that occasionally misinterpreted phrases or words.

## 2020-08-11 ENCOUNTER — TELEPHONE (OUTPATIENT)
Dept: PSYCHIATRY | Facility: CLINIC | Age: 50
End: 2020-08-11

## 2020-08-11 NOTE — TELEPHONE ENCOUNTER
----- Message from Stephanie Mcpherson MD sent at 8/9/2020 12:50 PM CDT -----  Two months, 8:00 a.m. appointment preferred virtually

## 2020-08-11 NOTE — TELEPHONE ENCOUNTER
Called patient and no answer left message for patient to call office [phone].  Scheduled patient for virtual visit on 10/13/2020 @ 8 AM.  Sent my chart message to confirm appointment

## 2020-10-12 ENCOUNTER — OFFICE VISIT (OUTPATIENT)
Dept: PSYCHIATRY | Facility: CLINIC | Age: 50
End: 2020-10-12
Payer: COMMERCIAL

## 2020-10-12 DIAGNOSIS — F41.1 GAD (GENERALIZED ANXIETY DISORDER): ICD-10-CM

## 2020-10-12 DIAGNOSIS — F33.41 MDD (MAJOR DEPRESSIVE DISORDER), RECURRENT, IN PARTIAL REMISSION: ICD-10-CM

## 2020-10-12 DIAGNOSIS — Z56.6 WORK-RELATED STRESS: ICD-10-CM

## 2020-10-12 PROCEDURE — 90833 PSYTX W PT W E/M 30 MIN: CPT | Mod: 95,,, | Performed by: PSYCHIATRY & NEUROLOGY

## 2020-10-12 PROCEDURE — 90833 PR PSYCHOTHERAPY W/PATIENT W/E&M, 30 MIN (ADD ON): ICD-10-PCS | Mod: 95,,, | Performed by: PSYCHIATRY & NEUROLOGY

## 2020-10-12 PROCEDURE — 99214 PR OFFICE/OUTPT VISIT, EST, LEVL IV, 30-39 MIN: ICD-10-PCS | Mod: 95,,, | Performed by: PSYCHIATRY & NEUROLOGY

## 2020-10-12 PROCEDURE — 99214 OFFICE O/P EST MOD 30 MIN: CPT | Mod: 95,,, | Performed by: PSYCHIATRY & NEUROLOGY

## 2020-10-12 RX ORDER — BUSPIRONE HYDROCHLORIDE 7.5 MG/1
7.5 TABLET ORAL 2 TIMES DAILY
Qty: 60 TABLET | Refills: 2 | Status: SHIPPED | OUTPATIENT
Start: 2020-10-12 | End: 2021-01-05

## 2020-10-12 RX ORDER — CLONAZEPAM 0.25 MG/1
0.25 TABLET, ORALLY DISINTEGRATING ORAL DAILY PRN
Start: 2020-10-12 | End: 2021-03-29

## 2020-10-12 RX ORDER — SERTRALINE HYDROCHLORIDE 100 MG/1
TABLET, FILM COATED ORAL
Start: 2020-10-12 | End: 2021-04-02

## 2020-10-12 SDOH — SOCIAL DETERMINANTS OF HEALTH (SDOH): OTHER PHYSICAL AND MENTAL STRAIN RELATED TO WORK: Z56.6
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
## 2020-10-18 ENCOUNTER — PATIENT MESSAGE (OUTPATIENT)
Dept: PSYCHIATRY | Facility: CLINIC | Age: 50
End: 2020-10-18

## 2020-10-23 ENCOUNTER — PATIENT MESSAGE (OUTPATIENT)
Dept: PSYCHIATRY | Facility: CLINIC | Age: 50
End: 2020-10-23

## 2020-10-23 ENCOUNTER — OFFICE VISIT (OUTPATIENT)
Dept: URGENT CARE | Facility: CLINIC | Age: 50
End: 2020-10-23
Payer: COMMERCIAL

## 2020-10-23 VITALS
HEART RATE: 84 BPM | TEMPERATURE: 98 F | SYSTOLIC BLOOD PRESSURE: 129 MMHG | OXYGEN SATURATION: 96 % | WEIGHT: 143 LBS | DIASTOLIC BLOOD PRESSURE: 81 MMHG | RESPIRATION RATE: 18 BRPM | HEIGHT: 65 IN | BODY MASS INDEX: 23.82 KG/M2

## 2020-10-23 DIAGNOSIS — J06.9 UPPER RESPIRATORY TRACT INFECTION, UNSPECIFIED TYPE: Primary | ICD-10-CM

## 2020-10-23 DIAGNOSIS — R05.9 COUGH: ICD-10-CM

## 2020-10-23 LAB
CTP QC/QA: YES
SARS-COV-2 RDRP RESP QL NAA+PROBE: NEGATIVE

## 2020-10-23 PROCEDURE — 99203 PR OFFICE/OUTPT VISIT, NEW, LEVL III, 30-44 MIN: ICD-10-PCS | Mod: S$GLB,,, | Performed by: PHYSICIAN ASSISTANT

## 2020-10-23 PROCEDURE — U0002: ICD-10-PCS | Mod: QW,S$GLB,, | Performed by: PHYSICIAN ASSISTANT

## 2020-10-23 PROCEDURE — U0002 COVID-19 LAB TEST NON-CDC: HCPCS | Mod: QW,S$GLB,, | Performed by: PHYSICIAN ASSISTANT

## 2020-10-23 PROCEDURE — 99203 OFFICE O/P NEW LOW 30 MIN: CPT | Mod: S$GLB,,, | Performed by: PHYSICIAN ASSISTANT

## 2020-10-23 NOTE — LETTER
[address] [facility] [address], [address] ? [place] [address] ? Phone [phone] ? Fax [phone]           Return to Work/School    Patient: Gaby Álvarez  YOB: 1970   Date: 10/23/2020      To Whom It May Concern:     Gaby Álvarez was in contact with/seen in my office on 10/23/2020. COVID-19 is present in our communities across the [facility]. Not all patients are eligible or appropriate to be tested. In this situation, your employee meets the following criteria:     Gaby Álvarez has met the criteria for COVID-19 testing and has a NEGATIVE result. The employee can return to work once they are asymptomatic for 24 hours without the use of fever reducing medications (Tylenol, Motrin, etc).     If you have any questions or concerns, or if I can be of further assistance, please do not hesitate to contact me.     Sincerely,      Jesus Carter PA-C

## 2020-10-23 NOTE — PROGRESS NOTES
"Subjective:       Patient ID: Gbay Álvarez is a 50 y.o. female.    Vitals:  height is 5' 5" (1.651 m) and weight is 64.9 kg (143 lb). Her temperature is 97.7 °F (36.5 °C). Her blood pressure is 129/81 and her pulse is 84. Her respiration is 18 and oxygen saturation is 96%.     Chief Complaint: Cough and Sinus Problem    Pt c/o cough and sinus congestion that started yesterday.      Cough  This is a new problem. The current episode started yesterday. The problem has been unchanged. The problem occurs every few minutes. The cough is productive of sputum. Associated symptoms include postnasal drip and a sore throat. Pertinent negatives include no chills, ear pain, eye redness, fever, hemoptysis, myalgias, rash, shortness of breath or wheezing. Nothing aggravates the symptoms. She has tried nothing for the symptoms. There is no history of asthma, bronchitis or pneumonia.   Sinus Problem  This is a new problem. The current episode started yesterday. The problem is unchanged. There has been no fever. Her pain is at a severity of 0/10. She is experiencing no pain. Associated symptoms include coughing and a sore throat. Pertinent negatives include no chills, congestion, diaphoresis, ear pain, shortness of breath or sinus pressure. Past treatments include nothing.       Constitution: Negative for chills, sweating, fatigue and fever.   HENT: Positive for postnasal drip and sore throat. Negative for ear pain, congestion, sinus pain, sinus pressure and voice change.    Neck: Positive for neck stiffness. Negative for painful lymph nodes.   Eyes: Negative for eye redness.   Respiratory: Positive for cough and sputum production. Negative for chest tightness, bloody sputum, COPD, shortness of breath, stridor, wheezing and asthma.    Gastrointestinal: Negative for nausea and vomiting.   Musculoskeletal: Negative for muscle ache.   Skin: Negative for rash.   Allergic/Immunologic: Negative for seasonal allergies and asthma. "   Hematologic/Lymphatic: Negative for swollen lymph nodes.       Objective:      Physical Exam   Constitutional: She is oriented to person, place, and time. She appears well-developed. She is cooperative.  Non-toxic appearance. She does not appear ill. No distress.   HENT:   Head: Normocephalic and atraumatic.   Ears:   Right Ear: Hearing, tympanic membrane, external ear and ear canal normal.   Left Ear: Hearing, tympanic membrane, external ear and ear canal normal.   Nose: Nose normal. No mucosal edema, rhinorrhea or nasal deformity. No epistaxis. Right sinus exhibits no maxillary sinus tenderness and no frontal sinus tenderness. Left sinus exhibits no maxillary sinus tenderness and no frontal sinus tenderness.   Mouth/Throat: Uvula is midline, oropharynx is clear and moist and mucous membranes are normal. No trismus in the jaw. Normal dentition. No uvula swelling. No oropharyngeal exudate, posterior oropharyngeal edema or posterior oropharyngeal erythema.   Eyes: Conjunctivae and lids are normal. No scleral icterus.   Neck: Trachea normal, full passive range of motion without pain and phonation normal. Neck supple. No neck rigidity. No edema and no erythema present.   Cardiovascular: Normal rate, regular rhythm, normal heart sounds and normal pulses.   Pulmonary/Chest: Effort normal and breath sounds normal. No stridor. No respiratory distress. She has no decreased breath sounds. She has no wheezes. She has no rhonchi. She has no rales.   Abdominal: Normal appearance.   Musculoskeletal: Normal range of motion.         General: No deformity.   Lymphadenopathy:     She has no cervical adenopathy.        Right cervical: No superficial cervical adenopathy present.       Left cervical: No superficial cervical adenopathy present.   Neurological: She is alert and oriented to person, place, and time. She has intact cranial nerves. She exhibits normal muscle tone. Coordination normal.      Comments: CN's grossly intact    Skin: Skin is warm, dry, intact, not diaphoretic and not pale. Psychiatric: Her speech is normal and behavior is normal. Judgment and thought content normal.   Nursing note and vitals reviewed.          Results for orders placed or performed in visit on 10/23/20   POCT COVID-19 Rapid Screening   Result Value Ref Range    POC Rapid COVID Negative Negative     Acceptable Yes      Results reviewed with pt.  Assessment:       1. Upper respiratory tract infection, unspecified type    2. Cough        Plan:         Upper respiratory tract infection, unspecified type    Cough  -     POCT COVID-19 Rapid Screening         Patient Instructions     Viral Upper Respiratory Illness (Adult)  You have a viral upper respiratory illness (URI), which is another term for the common cold. This illness is contagious during the first few days. It is spread through the air by coughing and sneezing. It may also be spread by direct contact (touching the sick person and then touching your own eyes, nose, or mouth). Frequent handwashing will decrease risk of spread. Most viral illnesses go away within 7 to 10 days with rest and simple home remedies. Sometimes the illness may last for several weeks. Antibiotics will not kill a virus, and they are generally not prescribed for this condition.    Home care  · If symptoms are severe, rest at home for the first 2 to 3 days. When you resume activity, don't let yourself get too tired.  · Avoid being exposed to cigarette smoke (yours or others).  · You may use acetaminophen or ibuprofen to control pain and fever, unless another medicine was prescribed. (Note: If you have chronic liver or kidney disease, have ever had a stomach ulcer or gastrointestinal bleeding, or are taking blood-thinning medicines, talk with your healthcare provider before using these medicines.) Aspirin should never be given to anyone under 18 years of age who is ill with a viral infection or fever. It may cause  severe liver or brain damage.  · Your appetite may be poor, so a light diet is fine. Avoid dehydration by drinking 6 to 8 glasses of fluids per day (water, soft drinks, juices, tea, or soup). Extra fluids will help loosen secretions in the nose and lungs.  · Over-the-counter cold medicines will not shorten the length of time youre sick, but they may be helpful for the following symptoms: cough, sore throat, and nasal and sinus congestion. (Note: Do not use decongestants if you have high blood pressure.)  Follow-up care  Follow up with your healthcare provider, or as advised.  When to seek medical advice  Call your healthcare provider right away if any of these occur:  · Cough with lots of colored sputum (mucus)  · Severe headache; face, neck, or ear pain  · Difficulty swallowing due to throat pain  · Fever of 100.4°F (38°C)  Call 911, or get immediate medical care  Call emergency services right away if any of these occur:  · Chest pain, shortness of breath, wheezing, or difficulty breathing  · Coughing up blood  · Inability to swallow due to throat pain  Date Last Reviewed: 9/13/2015  © 8617-8542 [employer]. [address] [place] [address]. All rights reserved. This information is not intended as a substitute for professional medical care. Always follow your healthcare professional's instructions.      You must understand that you've received an Urgent Care treatment only and that you may be released before all your medical problems are known or treated. You, the patient, will arrange for follow up care as instructed.    Follow up with your PCP or specialty clinic as directed in the next 1-2 weeks if not improved or as needed. You can call [phone] to schedule an appointment with the appropriate provider.    If your condition worsens we recommend that you receive another evaluation at the emergency room immediately or contact your primary medical clinic's after hours call service to  discuss your concerns.    Please go to the Emergency Department for any concerns or worsening of condition.

## 2020-10-23 NOTE — PATIENT INSTRUCTIONS
Viral Upper Respiratory Illness (Adult)  You have a viral upper respiratory illness (URI), which is another term for the common cold. This illness is contagious during the first few days. It is spread through the air by coughing and sneezing. It may also be spread by direct contact (touching the sick person and then touching your own eyes, nose, or mouth). Frequent handwashing will decrease risk of spread. Most viral illnesses go away within 7 to 10 days with rest and simple home remedies. Sometimes the illness may last for several weeks. Antibiotics will not kill a virus, and they are generally not prescribed for this condition.    Home care  · If symptoms are severe, rest at home for the first 2 to 3 days. When you resume activity, don't let yourself get too tired.  · Avoid being exposed to cigarette smoke (yours or others).  · You may use acetaminophen or ibuprofen to control pain and fever, unless another medicine was prescribed. (Note: If you have chronic liver or kidney disease, have ever had a stomach ulcer or gastrointestinal bleeding, or are taking blood-thinning medicines, talk with your healthcare provider before using these medicines.) Aspirin should never be given to anyone under 18 years of age who is ill with a viral infection or fever. It may cause severe liver or brain damage.  · Your appetite may be poor, so a light diet is fine. Avoid dehydration by drinking 6 to 8 glasses of fluids per day (water, soft drinks, juices, tea, or soup). Extra fluids will help loosen secretions in the nose and lungs.  · Over-the-counter cold medicines will not shorten the length of time youre sick, but they may be helpful for the following symptoms: cough, sore throat, and nasal and sinus congestion. (Note: Do not use decongestants if you have high blood pressure.)  Follow-up care  Follow up with your healthcare provider, or as advised.  When to seek medical advice  Call your healthcare provider right away if any  of these occur:  · Cough with lots of colored sputum (mucus)  · Severe headache; face, neck, or ear pain  · Difficulty swallowing due to throat pain  · Fever of 100.4°F (38°C)  Call 911, or get immediate medical care  Call emergency services right away if any of these occur:  · Chest pain, shortness of breath, wheezing, or difficulty breathing  · Coughing up blood  · Inability to swallow due to throat pain  Date Last Reviewed: 9/13/2015  © 3579-4952 [employer]. [address] [place] [address]. All rights reserved. This information is not intended as a substitute for professional medical care. Always follow your healthcare professional's instructions.      You must understand that you've received an Urgent Care treatment only and that you may be released before all your medical problems are known or treated. You, the patient, will arrange for follow up care as instructed.    Follow up with your PCP or specialty clinic as directed in the next 1-2 weeks if not improved or as needed. You can call [phone] to schedule an appointment with the appropriate provider.    If your condition worsens we recommend that you receive another evaluation at the emergency room immediately or contact your primary medical clinic's after hours call service to discuss your concerns.    Please go to the Emergency Department for any concerns or worsening of condition.

## 2020-10-24 ENCOUNTER — PATIENT MESSAGE (OUTPATIENT)
Dept: PSYCHIATRY | Facility: CLINIC | Age: 50
End: 2020-10-24

## 2020-10-25 RX ORDER — GUAIFENESIN 600 MG/1
1200 TABLET, EXTENDED RELEASE ORAL 2 TIMES DAILY PRN
COMMUNITY
End: 2021-03-29

## 2020-10-26 ENCOUNTER — TELEPHONE (OUTPATIENT)
Dept: URGENT CARE | Facility: CLINIC | Age: 50
End: 2020-10-26

## 2020-11-13 ENCOUNTER — CLINICAL SUPPORT (OUTPATIENT)
Dept: URGENT CARE | Facility: CLINIC | Age: 50
End: 2020-11-13
Payer: COMMERCIAL

## 2020-11-13 VITALS — TEMPERATURE: 98 F

## 2020-11-13 DIAGNOSIS — R05.9 COUGH: Primary | ICD-10-CM

## 2020-11-13 LAB
CTP QC/QA: YES
SARS-COV-2 RDRP RESP QL NAA+PROBE: NEGATIVE

## 2020-11-13 PROCEDURE — U0002 COVID-19 LAB TEST NON-CDC: HCPCS | Mod: QW,S$GLB,, | Performed by: NURSE PRACTITIONER

## 2020-11-13 PROCEDURE — U0002: ICD-10-PCS | Mod: QW,S$GLB,, | Performed by: NURSE PRACTITIONER

## 2020-11-13 NOTE — PROGRESS NOTES
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

## 2020-12-29 ENCOUNTER — HOSPITAL ENCOUNTER (OUTPATIENT)
Dept: RADIOLOGY | Facility: HOSPITAL | Age: 50
Discharge: HOME | End: 2020-12-29
Attending: OBSTETRICS & GYNECOLOGY
Payer: COMMERCIAL

## 2020-12-29 DIAGNOSIS — Z12.31 ENCOUNTER FOR SCREENING MAMMOGRAM FOR MALIGNANT NEOPLASM OF BREAST: ICD-10-CM

## 2020-12-29 PROCEDURE — 77067 SCR MAMMO BI INCL CAD: CPT | Mod: TC,PO

## 2021-03-29 ENCOUNTER — OFFICE VISIT (OUTPATIENT)
Dept: PSYCHIATRY | Facility: CLINIC | Age: 51
End: 2021-03-29
Payer: COMMERCIAL

## 2021-03-29 DIAGNOSIS — F33.41 MDD (MAJOR DEPRESSIVE DISORDER), RECURRENT, IN PARTIAL REMISSION: ICD-10-CM

## 2021-03-29 DIAGNOSIS — F41.1 GAD (GENERALIZED ANXIETY DISORDER): ICD-10-CM

## 2021-03-29 PROCEDURE — 99214 PR OFFICE/OUTPT VISIT, EST, LEVL IV, 30-39 MIN: ICD-10-PCS | Mod: 95,,, | Performed by: PSYCHIATRY & NEUROLOGY

## 2021-03-29 PROCEDURE — 99214 OFFICE O/P EST MOD 30 MIN: CPT | Mod: 95,,, | Performed by: PSYCHIATRY & NEUROLOGY

## 2021-04-02 RX ORDER — SERTRALINE HYDROCHLORIDE 100 MG/1
TABLET, FILM COATED ORAL
Start: 2021-04-02 | End: 2021-04-13

## 2021-04-12 ENCOUNTER — PATIENT MESSAGE (OUTPATIENT)
Dept: PSYCHIATRY | Facility: CLINIC | Age: 51
End: 2021-04-12

## 2021-04-13 RX ORDER — SERTRALINE HYDROCHLORIDE 100 MG/1
TABLET, FILM COATED ORAL
Start: 2021-04-13 | End: 2022-04-19

## 2021-04-21 ENCOUNTER — PATIENT MESSAGE (OUTPATIENT)
Dept: PSYCHIATRY | Facility: CLINIC | Age: 51
End: 2021-04-21

## 2021-07-28 ENCOUNTER — TELEPHONE (OUTPATIENT)
Dept: GASTROENTEROLOGY | Facility: CLINIC | Age: 51
End: 2021-07-28

## 2022-01-25 ENCOUNTER — HOSPITAL ENCOUNTER (OUTPATIENT)
Dept: RADIOLOGY | Facility: HOSPITAL | Age: 52
Discharge: HOME | End: 2022-01-25
Attending: FAMILY MEDICINE
Payer: COMMERCIAL

## 2022-01-25 DIAGNOSIS — Z12.39 SCREENING FOR BREAST CANCER: ICD-10-CM

## 2022-01-25 PROCEDURE — 77063 BREAST TOMOSYNTHESIS BI: CPT | Mod: TC,PO

## 2022-04-19 ENCOUNTER — OFFICE VISIT (OUTPATIENT)
Dept: OBSTETRICS AND GYNECOLOGY | Facility: CLINIC | Age: 52
End: 2022-04-19
Payer: COMMERCIAL

## 2022-04-19 VITALS — WEIGHT: 154 LBS | BODY MASS INDEX: 25.63 KG/M2 | SYSTOLIC BLOOD PRESSURE: 113 MMHG | DIASTOLIC BLOOD PRESSURE: 81 MMHG

## 2022-04-19 DIAGNOSIS — N89.8 VAGINAL DRYNESS: ICD-10-CM

## 2022-04-19 DIAGNOSIS — Z12.4 PAP SMEAR FOR CERVICAL CANCER SCREENING: ICD-10-CM

## 2022-04-19 DIAGNOSIS — Z01.419 ENCOUNTER FOR ANNUAL ROUTINE GYNECOLOGICAL EXAMINATION: Primary | ICD-10-CM

## 2022-04-19 DIAGNOSIS — N95.2 VAGINAL ATROPHY: ICD-10-CM

## 2022-04-19 PROCEDURE — 3008F BODY MASS INDEX DOCD: CPT | Mod: CPTII,S$GLB,, | Performed by: OBSTETRICS & GYNECOLOGY

## 2022-04-19 PROCEDURE — 1159F MED LIST DOCD IN RCRD: CPT | Mod: CPTII,S$GLB,, | Performed by: OBSTETRICS & GYNECOLOGY

## 2022-04-19 PROCEDURE — 99386 PREV VISIT NEW AGE 40-64: CPT | Mod: S$GLB,,, | Performed by: OBSTETRICS & GYNECOLOGY

## 2022-04-19 PROCEDURE — 99999 PR PBB SHADOW E&M-EST. PATIENT-LVL III: ICD-10-PCS | Mod: PBBFAC,,, | Performed by: OBSTETRICS & GYNECOLOGY

## 2022-04-19 PROCEDURE — 3074F SYST BP LT 130 MM HG: CPT | Mod: CPTII,S$GLB,, | Performed by: OBSTETRICS & GYNECOLOGY

## 2022-04-19 PROCEDURE — 3079F DIAST BP 80-89 MM HG: CPT | Mod: CPTII,S$GLB,, | Performed by: OBSTETRICS & GYNECOLOGY

## 2022-04-19 PROCEDURE — 87481 CANDIDA DNA AMP PROBE: CPT | Mod: 59 | Performed by: OBSTETRICS & GYNECOLOGY

## 2022-04-19 PROCEDURE — 87624 HPV HI-RISK TYP POOLED RSLT: CPT | Performed by: OBSTETRICS & GYNECOLOGY

## 2022-04-19 PROCEDURE — 88175 CYTOPATH C/V AUTO FLUID REDO: CPT | Performed by: OBSTETRICS & GYNECOLOGY

## 2022-04-19 PROCEDURE — 1160F RVW MEDS BY RX/DR IN RCRD: CPT | Mod: CPTII,S$GLB,, | Performed by: OBSTETRICS & GYNECOLOGY

## 2022-04-19 PROCEDURE — 99999 PR PBB SHADOW E&M-EST. PATIENT-LVL III: CPT | Mod: PBBFAC,,, | Performed by: OBSTETRICS & GYNECOLOGY

## 2022-04-19 PROCEDURE — 3008F PR BODY MASS INDEX (BMI) DOCUMENTED: ICD-10-PCS | Mod: CPTII,S$GLB,, | Performed by: OBSTETRICS & GYNECOLOGY

## 2022-04-19 PROCEDURE — 3079F PR MOST RECENT DIASTOLIC BLOOD PRESSURE 80-89 MM HG: ICD-10-PCS | Mod: CPTII,S$GLB,, | Performed by: OBSTETRICS & GYNECOLOGY

## 2022-04-19 PROCEDURE — 1159F PR MEDICATION LIST DOCUMENTED IN MEDICAL RECORD: ICD-10-PCS | Mod: CPTII,S$GLB,, | Performed by: OBSTETRICS & GYNECOLOGY

## 2022-04-19 PROCEDURE — 99386 PR PREVENTIVE VISIT,NEW,40-64: ICD-10-PCS | Mod: S$GLB,,, | Performed by: OBSTETRICS & GYNECOLOGY

## 2022-04-19 PROCEDURE — 3074F PR MOST RECENT SYSTOLIC BLOOD PRESSURE < 130 MM HG: ICD-10-PCS | Mod: CPTII,S$GLB,, | Performed by: OBSTETRICS & GYNECOLOGY

## 2022-04-19 PROCEDURE — 87801 DETECT AGNT MULT DNA AMPLI: CPT | Performed by: OBSTETRICS & GYNECOLOGY

## 2022-04-19 PROCEDURE — 1160F PR REVIEW ALL MEDS BY PRESCRIBER/CLIN PHARMACIST DOCUMENTED: ICD-10-PCS | Mod: CPTII,S$GLB,, | Performed by: OBSTETRICS & GYNECOLOGY

## 2022-04-19 NOTE — PROGRESS NOTES
Chief Complaint   Patient presents with    Well Woman       HISTORY OF PRESENT ILLNESS:   Gaby Álvarez is a 52 y.o. female  who presents for well woman exam.  Patient's last menstrual period was 2017.. Menopause at age 46  Never did HRT. She has no complaints dx with lichen sclerosis based on appearance, never biopsied. Reports has had the issues for over 10 years where she has extreme dryness and cracks in skin, tried clobetasol and other creams but never did it consisently and unsure really how to use it. She has significant pain during sex. She then started having dry skin on her elbows and legs and cracks in her finger skin and started on dubuxa and it is improved significantly.Told her it was atopic dermatitis. She also had an ablation in vein in her left leg and recently started having more pain in the leg up into the groin area. Where it feels like she has to move her underwear and rest and it will get better. Had a CT scan yesterday at [facility] and there was no hernia seen.    Declines STD testing.      Past Medical History:   Diagnosis Date    Anxiety disorder     Migraine           Past Surgical History:   Procedure Laterality Date     SECTION      GALLBLADDER SURGERY           Social History     Socioeconomic History    Marital status:    Tobacco Use    Smoking status: Never Smoker    Smokeless tobacco: Never Used   Substance and Sexual Activity    Alcohol use: Yes     Comment: occasionally    Drug use: No       Family History   Problem Relation Age of Onset    Cancer Mother     Migraines Mother     Liver disease Father     Crohn's disease Father          OB History    Para Term  AB Living   2 2 2         SAB IAB Ectopic Multiple Live Births                  # Outcome Date GA Lbr Leonidas/2nd Weight Sex Delivery Anes PTL Lv   2 Term            1 Term                COMPREHENSIVE GYN HISTORY:  PAP History: Denies abnormal Paps  Infection History: Denies STDs.  Denies PID.  Benign History: Denies uterine fibroids. Denies ovarian cysts. Denies endometriosis Denies other conditions.  Cancer History: Denies cervical cancer. Denies uterine cancer or hyperplasia. Denies ovarian cancer. Denies vulvar cancer or pre-cancer. Denies vaginal cancer or pre-cancer. Denies breast cancer. Denies colon cancer.  Cycle: 12/mon/normal, menopause at 46, no HRT    ROS:  GENERAL: Denies weight gain or weight loss. Feeling well overall.   SKIN: Denies rash or lesions.   HEAD: Denies headache.   NODES: Denies enlarged lymph nodes.   CHEST: Denies shortness of breath.   ABDOMEN: No abdominal pain, constipation, diarrhea, nausea, vomiting or rectal bleeding.   URINARY: No frequency, dysuria, hematuria, or burning on urination.  REPRODUCTIVE: See HPI.   BREASTS: The patient denies pain, lumps, or nipple discharge.       /81   Wt 69.9 kg (154 lb)   LMP 07/01/2017   BMI 25.63 kg/m²     APPEARANCE: Well nourished, well developed, in no acute distress.  NECK: Neck symmetric  ABDOMEN: Soft. No tenderness or masses. No hernias. No hepatosplenomegaly.  BREASTS: Symmetrical, no skin changes or visible lesions. No palpable masses, nipple discharge or adenopathy bilaterally.  PELVIC:   VULVA: No lesions. Normal female genitalia. Labia minora fused with labia majora, very thing skin at introitus, no fissures noted or plaques. No hernia appreciated  URETHRAL MEATUS: Normal size and location, no lesions, no prolapse.  URETHRA: No masses, tenderness, prolapse or scarring.  VAGINA: atrophic no discharge, no significant cystocele or rectocele.  CERVIX: atrophic, flush with vaginal walls, No lesions and discharge.  UTERUS: Normal size, regular shape, mobile, non-tender, bladder base nontender.  ADNEXA: No masses or tenderness.  PERINEUM: Normal, mo masses      1. Encounter for annual routine gynecological examination    2. Pap smear for cervical cancer screening        A/P 1. Routine gyn annual exam. s/p  normal breast exam and MMG done.  Pap with HPV cotesting ordered. STD testing: GC/CT/trich, syphilis, HBV/HCV and HIV declined. Lipid Profile, needed every 5 years, up to date. Fasting glucose, needed every 3 years, up to date.   TSH, needed every 5 years, up to date.   Colonoscopy up to date.   2. Discussed vaginal atrophy as causing vaginal dryness and pain during intercourse. Affirm done but likely 2/2 atrophy. Feel that will need topical estrogen to start with. Discussed risk of absorption is very low so low risk of breast ca, heart disease or stroke. Likely has more than one thing going on with skin changes externally but discussed with her that can be difficult to tell the difference without tissue sample so recommend biopsy externally to see if need clobetasol as well. May also need vaginal dilation with pelvic floor PT. Discussed will likely need to do the topical estrogen for a month then do PT if not improved.   3. No lesions or masses that would explain the labial pain, sounds vascular or like a neuropathy, is going to f/u with vascular surgery and if not likely related then can get dynamic US to evaluate for hernia and if negative then can also do physical therapy.       F/u in 1 yr or PRN

## 2022-04-21 LAB
BACTERIAL VAGINOSIS DNA: NEGATIVE
CANDIDA GLABRATA DNA: NEGATIVE
CANDIDA KRUSEI DNA: NEGATIVE
CANDIDA RRNA VAG QL PROBE: NEGATIVE
T VAGINALIS RRNA GENITAL QL PROBE: NEGATIVE

## 2022-04-22 ENCOUNTER — PATIENT MESSAGE (OUTPATIENT)
Dept: OBSTETRICS AND GYNECOLOGY | Facility: HOSPITAL | Age: 52
End: 2022-04-22
Payer: COMMERCIAL

## 2022-04-25 LAB
FINAL PATHOLOGIC DIAGNOSIS: NORMAL
HPV HR 12 DNA SPEC QL NAA+PROBE: NEGATIVE
HPV16 AG SPEC QL: NEGATIVE
HPV18 DNA SPEC QL NAA+PROBE: NEGATIVE
Lab: NORMAL

## 2022-04-26 ENCOUNTER — PATIENT MESSAGE (OUTPATIENT)
Dept: OBSTETRICS AND GYNECOLOGY | Facility: CLINIC | Age: 52
End: 2022-04-26
Payer: COMMERCIAL

## 2022-04-29 ENCOUNTER — PROCEDURE VISIT (OUTPATIENT)
Dept: OBSTETRICS AND GYNECOLOGY | Facility: CLINIC | Age: 52
End: 2022-04-29
Payer: COMMERCIAL

## 2022-04-29 VITALS
SYSTOLIC BLOOD PRESSURE: 134 MMHG | WEIGHT: 155.69 LBS | DIASTOLIC BLOOD PRESSURE: 86 MMHG | BODY MASS INDEX: 25.91 KG/M2

## 2022-04-29 DIAGNOSIS — N90.89 VULVAR IRRITATION: Primary | ICD-10-CM

## 2022-04-29 PROCEDURE — 88312 SPECIAL STAINS GROUP 1: CPT | Mod: 26,,, | Performed by: PATHOLOGY

## 2022-04-29 PROCEDURE — 56605 BIOPSY OF VULVA/PERINEUM: CPT | Mod: S$GLB,,, | Performed by: OBSTETRICS & GYNECOLOGY

## 2022-04-29 PROCEDURE — 56605 PR BIOPSY VULVA/PERINEUM,ONE LESN: ICD-10-PCS | Mod: S$GLB,,, | Performed by: OBSTETRICS & GYNECOLOGY

## 2022-04-29 PROCEDURE — 88305 TISSUE EXAM BY PATHOLOGIST: CPT | Mod: 26,,, | Performed by: PATHOLOGY

## 2022-04-29 PROCEDURE — 88312 SPECIAL STAINS GROUP 1: CPT | Performed by: PATHOLOGY

## 2022-04-29 PROCEDURE — 88305 TISSUE EXAM BY PATHOLOGIST: ICD-10-PCS | Mod: 26,,, | Performed by: PATHOLOGY

## 2022-04-29 PROCEDURE — 88305 TISSUE EXAM BY PATHOLOGIST: CPT | Performed by: PATHOLOGY

## 2022-04-29 PROCEDURE — 99212 OFFICE O/P EST SF 10 MIN: CPT | Mod: 25,S$GLB,, | Performed by: OBSTETRICS & GYNECOLOGY

## 2022-04-29 PROCEDURE — 88312 PR  SPECIAL STAINS,GROUP I: ICD-10-PCS | Mod: 26,,, | Performed by: PATHOLOGY

## 2022-04-29 PROCEDURE — 99212 PR OFFICE/OUTPT VISIT, EST, LEVL II, 10-19 MIN: ICD-10-PCS | Mod: 25,S$GLB,, | Performed by: OBSTETRICS & GYNECOLOGY

## 2022-04-29 NOTE — PROGRESS NOTES
Chief Complaint   Patient presents with    Vulvar Rash       HISTORY OF PRESENT ILLNESS:   Gaby Álvarez is a 52 y.o. female  who presents for vulvar biopsy.  Patient's last menstrual period was 2017. Menopause at age 46  Never did HRT. She has no complaints dx with lichen sclerosis based on appearance, never biopsied. Reports has had the issues for over 10 years where she has extreme dryness and cracks in vulvar skin, tried clobetasol and other creams but never did it consisently and unsure really how to use it. She has significant pain during sex. She then started having dry skin on her elbows and legs and cracks in her finger skin and started on dubuxa and it is improved significantly.Told her it was atopic dermatitis.    Past Medical History:   Diagnosis Date    Anxiety disorder     Migraine     GA  Past Surgical History:   Procedure Laterality Date     SECTION      GALLBLADDER SURGERY     LLBLADDER SURGERY          Socioeconomic History    Marital status:    Tobacco Use    Smoking status: Never Smoker    Smokeless tobacco: Never Used   Substance and Sexual Activity    Alcohol use: Yes     Comment: occasionally    Drug use: No       Family History   Problem Relation Age of Onset    Cancer Mother     Migraines Mother     Liver disease Father     Crohn's disease Father          OB History    Para Term  AB Living   2 2 2         SAB IAB Ectopic Multiple Live Births                  # Outcome Date GA Lbr Leonidas/2nd Weight Sex Delivery Anes PTL Lv   2 Term            1 Term                COMPREHENSIVE GYN HISTORY:  PAP History: Denies abnormal Paps  Infection History: Denies STDs. Denies PID.  Benign History: Denies uterine fibroids. Denies ovarian cysts. Denies endometriosis Denies other conditions.  Cancer History: Denies cervical cancer. Denies uterine cancer or hyperplasia. Denies ovarian cancer. Denies vulvar cancer or pre-cancer. Denies vaginal cancer or  pre-cancer. Denies breast cancer. Denies colon cancer.  Cycle: 12/mon/normal, menopause at 46, no HRT    ROS:  GENERAL: Denies weight gain or weight loss. Feeling well overall.   SKIN: Denies rash or lesions.   HEAD: Denies headache.   NODES: Denies enlarged lymph nodes.   CHEST: Denies shortness of breath.   ABDOMEN: No abdominal pain, constipation, diarrhea, nausea, vomiting or rectal bleeding.   URINARY: No frequency, dysuria, hematuria, or burning on urination.  REPRODUCTIVE: See HPI.   BREASTS: The patient denies pain, lumps, or nipple discharge.       /86   Wt 70.6 kg (155 lb 11.2 oz)   LMP 07/01/2017   BMI 25.91 kg/m²     APPEARANCE: Well nourished, well developed, in no acute distress.  NECK: Neck symmetric    PELVIC:   VULVA: No lesions. Normal female genitalia. Labia minora fused with labia majora, very thing skin at introitus, has a fissure between the labia minora and majora. No hernia appreciated        1. Vulvar irritation        A/P 1. biopsy done today.  Discussed vaginal atrophy as causing vaginal dryness and pain during intercourse.  Feel that will need topical estrogen but recommend biopsy to make sure no lichen sclerosis as well. Discussed risk of absorption is very low so low risk of breast ca, heart disease or stroke. Likely has more than one thing going on with skin changes externally but discussed with her that can be difficult to tell the difference without tissue sample so recommend biopsy externally to see if need clobetasol as well. May also need vaginal dilation with pelvic floor PT. Discussed will likely need to do the topical estrogen for a month then do PT if not improved.

## 2022-04-29 NOTE — PROCEDURES
Procedures     04/29/2022  Procedure: vulvar punch biopsy   Complications: none   Condition stable: EBL: 1cc  Procedure: area cleansed with iodine and area on left labia majora/minora numbed with 1cc of lidocaine with epi and 3mm punch biopsy done and sample sent to pathology. Site made hemostatic with silver nitrate.

## 2022-05-05 ENCOUNTER — PATIENT MESSAGE (OUTPATIENT)
Dept: OBSTETRICS AND GYNECOLOGY | Facility: CLINIC | Age: 52
End: 2022-05-05
Payer: COMMERCIAL

## 2022-05-05 RX ORDER — MUPIROCIN 20 MG/G
OINTMENT TOPICAL 3 TIMES DAILY
Qty: 30 G | Refills: 0 | Status: SHIPPED | OUTPATIENT
Start: 2022-05-05 | End: 2022-09-20 | Stop reason: DRUGHIGH

## 2022-05-09 LAB
FINAL PATHOLOGIC DIAGNOSIS: NORMAL
GROSS: NORMAL
Lab: NORMAL

## 2022-05-10 ENCOUNTER — TELEPHONE (OUTPATIENT)
Dept: OBSTETRICS AND GYNECOLOGY | Facility: CLINIC | Age: 52
End: 2022-05-10
Payer: COMMERCIAL

## 2022-05-10 RX ORDER — ESTRADIOL 0.1 MG/G
CREAM VAGINAL
Qty: 42.5 G | Refills: 1 | Status: SHIPPED | OUTPATIENT
Start: 2022-05-10 | End: 2022-11-11

## 2022-05-10 NOTE — TELEPHONE ENCOUNTER
Called patient and discussed normal biopsy, wants to do vaginal estrogen, sent in and discussed how to use it.

## 2022-06-27 ENCOUNTER — PATIENT MESSAGE (OUTPATIENT)
Dept: PSYCHIATRY | Facility: CLINIC | Age: 52
End: 2022-06-27
Payer: COMMERCIAL

## 2022-06-27 RX ORDER — SERTRALINE HYDROCHLORIDE 50 MG/1
TABLET, FILM COATED ORAL
Qty: 30 TABLET | Refills: 0 | Status: SHIPPED | OUTPATIENT
Start: 2022-06-27 | End: 2022-07-28

## 2022-06-27 RX ORDER — DUPILUMAB 300 MG/2ML
300 INJECTION, SOLUTION SUBCUTANEOUS
COMMUNITY
Start: 2022-05-19

## 2022-06-28 ENCOUNTER — PATIENT MESSAGE (OUTPATIENT)
Dept: PSYCHIATRY | Facility: CLINIC | Age: 52
End: 2022-06-28
Payer: COMMERCIAL

## 2022-07-06 ENCOUNTER — PATIENT MESSAGE (OUTPATIENT)
Dept: PSYCHIATRY | Facility: CLINIC | Age: 52
End: 2022-07-06
Payer: COMMERCIAL

## 2022-07-15 ENCOUNTER — PATIENT MESSAGE (OUTPATIENT)
Dept: PSYCHIATRY | Facility: CLINIC | Age: 52
End: 2022-07-15
Payer: COMMERCIAL

## 2022-08-26 ENCOUNTER — PATIENT MESSAGE (OUTPATIENT)
Dept: PSYCHIATRY | Facility: CLINIC | Age: 52
End: 2022-08-26
Payer: COMMERCIAL

## 2022-08-26 RX ORDER — SERTRALINE HYDROCHLORIDE 100 MG/1
100 TABLET, FILM COATED ORAL DAILY
Qty: 30 TABLET | Refills: 2 | Status: SHIPPED | OUTPATIENT
Start: 2022-08-26 | End: 2022-09-20 | Stop reason: SDUPTHER

## 2022-08-29 ENCOUNTER — PATIENT MESSAGE (OUTPATIENT)
Dept: PSYCHIATRY | Facility: CLINIC | Age: 52
End: 2022-08-29
Payer: COMMERCIAL

## 2022-09-20 ENCOUNTER — OFFICE VISIT (OUTPATIENT)
Dept: PSYCHIATRY | Facility: CLINIC | Age: 52
End: 2022-09-20
Payer: COMMERCIAL

## 2022-09-20 DIAGNOSIS — F41.1 GAD (GENERALIZED ANXIETY DISORDER): ICD-10-CM

## 2022-09-20 DIAGNOSIS — Z63.4 BEREAVEMENT: ICD-10-CM

## 2022-09-20 DIAGNOSIS — F33.42 MDD (MAJOR DEPRESSIVE DISORDER), RECURRENT, IN FULL REMISSION: ICD-10-CM

## 2022-09-20 PROCEDURE — 1160F PR REVIEW ALL MEDS BY PRESCRIBER/CLIN PHARMACIST DOCUMENTED: ICD-10-PCS | Mod: CPTII,95,, | Performed by: PSYCHIATRY & NEUROLOGY

## 2022-09-20 PROCEDURE — 99214 PR OFFICE/OUTPT VISIT, EST, LEVL IV, 30-39 MIN: ICD-10-PCS | Mod: 95,,, | Performed by: PSYCHIATRY & NEUROLOGY

## 2022-09-20 PROCEDURE — 1159F PR MEDICATION LIST DOCUMENTED IN MEDICAL RECORD: ICD-10-PCS | Mod: CPTII,95,, | Performed by: PSYCHIATRY & NEUROLOGY

## 2022-09-20 PROCEDURE — 1159F MED LIST DOCD IN RCRD: CPT | Mod: CPTII,95,, | Performed by: PSYCHIATRY & NEUROLOGY

## 2022-09-20 PROCEDURE — 99214 OFFICE O/P EST MOD 30 MIN: CPT | Mod: 95,,, | Performed by: PSYCHIATRY & NEUROLOGY

## 2022-09-20 PROCEDURE — 1160F RVW MEDS BY RX/DR IN RCRD: CPT | Mod: CPTII,95,, | Performed by: PSYCHIATRY & NEUROLOGY

## 2022-09-20 RX ORDER — SERTRALINE HYDROCHLORIDE 100 MG/1
TABLET, FILM COATED ORAL
Qty: 30 TABLET | Refills: 2 | Status: SHIPPED | OUTPATIENT
Start: 2022-09-20 | End: 2023-01-31 | Stop reason: SDUPTHER

## 2022-09-20 RX ORDER — SERTRALINE HYDROCHLORIDE 50 MG/1
50 TABLET, FILM COATED ORAL DAILY
Qty: 30 TABLET | Refills: 2 | Status: SHIPPED | OUTPATIENT
Start: 2022-09-20 | End: 2022-11-11 | Stop reason: DRUGHIGH

## 2022-09-20 RX ORDER — ALPRAZOLAM 0.5 MG/1
0.5 TABLET ORAL NIGHTLY PRN
COMMUNITY
Start: 2022-07-26 | End: 2022-11-11 | Stop reason: SDUPTHER

## 2022-09-20 SDOH — SOCIAL DETERMINANTS OF HEALTH (SDOH): DISSAPEARANCE AND DEATH OF FAMILY MEMBER: Z63.4

## 2022-09-20 NOTE — PROGRESS NOTES
Outpatient Psychiatry Follow Up Visit (MD/NP)    The patient location is: at home, address on [address], [place], [place]  The chief complaint leading to consultation is: medication management    Visit type: audiovisual    Face to Face time with patient:  30 min  Thirty minutes of total time spent on the encounter, which includes face to face time and non-face to face time preparing to see the patient (eg, review of tests), Obtaining and/or reviewing separately obtained history, Documenting clinical information in the electronic or other health record, Independently interpreting results (not separately reported) and communicating results to the patient/family/caregiver, or Care coordination (not separately reported).     Each patient to whom he or she provides medical services by telemedicine is:  (1) informed of the relationship between the physician and patient and the respective role of any other health care provider with respect to management of the patient; and (2) notified that he or she may decline to receive medical services by telemedicine and may withdraw from such care at any time.      9/20/2022    Gaby Omar Álvarez, a 52 y.o. female, presenting for follow up visit. Met with patient alone     Reason for Encounter: self-referral. Patient complains of anxiety, depression.     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     Pt reports this medical stressor has occurred along with problems with her 24 yo daughter (not getting along with her father), her son graduating from high school.   She has taught x 23 years and she reports an incident occurred at school in the month of May which has caused her significant distress. She realized that during [employer] testing, there was something written on the wall that needed to be flipped over so  the student would not have the information during the testing.   Pt did flip the info over within a few minutes, but was very bothered that she had done this so she admitted her wrong doing to her principal.  Pt reports she ended up being written up - the first time this has ever happened to her which has been very distressing for her.  Additionally, she reports there was a meeting at school with the student and other team members and she was so concerned they would talk about her, that she decided to record the conversation on her phone.  She realized later that nothing actually did record on her phone, but she has lived in intense fear that she may lose her job.  She also feels her coworkers are treating her differently, and she is upset that no one has asked her how she is doing.   She denies hx of davina.   She denies hx of auditory/visual hallucinations. Pt is paranoid that coworkers are plotting against her and talking about her. She does feel friends and coworkers are talking about her and are plotting together.    Past Psychiatric History:  Prior diagnosis:  none  Inpatient psychiatric tx: none   Outpatient psychiatric tx:  None     Prior medications:   2005 prescribed something x 2 days - Cymbalta or Wellbutrin - sick to stomach, did not take it PCP  Lexapro, Clonazepam   Left work due to anxiety in 2010 - mother ill at time   Since under my care:  Abilify - weaned off, Sertraline   Prior suicide attempts: none  Prior hx self harm: none   Prior psychotherapy: none  Prior psychological testing:  None     Past medical history:  Superior canal dehiscence   Atopic dermatitis     INTERIM HISTORY:   Pt seen via telemedicine for convenience as pt lives apprx 45 miles from clinic.   She is taking Sertraline 100 mg daily.   She has had flare of SCD symptoms in interim, thought to be stress related.   Back on betahistine BID since June.  Pt also had a MRI of brain done - no change.   She lost her sister in law in July  to terminal cancer.  This has been very hard - pt has been supportive to her children who are young adults.   Every day, she feels better with vertigo, but still experiencing anxiety.   She has follow up appt with Dr. Bowden in November.  Was seen by NP at Neurologist office, prescribed Betahistine and Xanax.  She does not want to r/s Clonazepam.   She is under care of [facility] and is taking Dupixent for atopic dermatitis.     Daughter wedding is scheduled for January   Son Bhargav is graduating - new job, chemical engineering (in [place])  Worries about her children, life in general - worries all the time.  Sertraline is helping, but not enough.  Anxiety is 2/10 (10 worst).  Anxiety does not feel unmanageable   Not irritable. No panic attacks; no agoraphobia. She does clinch her jaw at night - no broken teeth.   Focus ok; spends a good amount of time working on art.     Sleep: absolutely; 10 hrs/night   Energy; motivation - can enjoy the good stuff     Anxiety is nowhere near where it was   Sometimes, she feels down; tries to turn it around, spends time with her niece who is finishing PA school    Denies hopelessness/worthlessness.   Denies suicidal/homicidal ideations.  No self harm or violence.     Appetite: about the same   Has not been exercising - plans to start with    Last alcohol June 11 - she questions if it may have triggered vertigo   No caffeine   Denies substance use.     Medications:   Sertraline 100 mg daily   Xanax 0.5 mg daily prn anxiety - prescribed by Neurology     Review Of Systems:     GENERAL:  weight stable   CARDIOVASCULAR:  No tachycardia or chest pain  MUSCULOSKELETAL:  No pain or stiffness of the joints  DERM: she reports dx is atopic dermatitis on fingers, elbows - improved   Neuro: SCD/vertigo   Current Evaluation:     Nutritional Screening: Considering the patient's height and weight, medications, medical history and preferences, should a referral be made to the  "dietitian? no    Constitutional  Vitals:  Most recent vital signs, dated more than 90 days prior to this appointment, were reviewed (not available)     General:  age appropriate, normal weight, well nourished, well dressed, neatly groomed, good eye contact      Musculoskeletal  Muscle Strength/Tone:  No tremor observed    Gait & Station:  Not able to assess virtually     Psychiatric  Speech:  no latency; no press   Mood & Affect:  "Ok"   Restricted    Thought Process:  Linear with questions    Associations:  No RAMAKRISHNA    Thought Content:  no suicidality, no homicidality, hallucinations: (auditory: no, visual: no), mild paranoid ideations    Insight:  Improved    Judgement: Improved    Orientation:  grossly intact, person, place, situation, time/date, day of week, month of year, year   Memory: intact for content of interview, able to remember recent events- no, able to remember remote events- yes   Language: grossly intact   Attention Span & Concentration:  able to focus   Fund of Knowledge:  intact and appropriate to age and level of education, familiar with aspects of current personal life       Functioning in Relationships:  Spouse/partner: supportive, encouraged her to get help  Peers: limited   Employers:  Retired     Laboratory Data  No visits with results within 1 Month(s) from this visit.   Latest known visit with results is:   Procedure visit on 04/29/2022   Component Date Value Ref Range Status    Final Pathologic Diagnosis 04/29/2022    Final                    Value:VULVAR BIOPSY SHOWS MINIMAL HYPERKERATOSIS, NO EVIDENCE OF DYSPLASIA OR  LICHEN SCLEROSIS IDENTIFIED.  MULTIPLE LEVELS WERE REVIEWED.  PAS STAIN FOR  FUNGUS IS NEGATIVE.  THE CONTROL STAINS APPROPRIATELY.  NOTE:  WE ADVISE CORRELATION WITH CLINICAL FINDINGS.  THIS CASE WAS REVIEWED  BY DR. CLARK WHO CONCURS WITH THE DIAGNOSIS.      Gross 04/29/2022    Final                    Value:Pathology ID/Patient ID:  [id number]  The specimen is received in " "formalin labeled "vulvar bx".  The specimen  consists of 1 tan-yellow fragments of soft tissue measuring 0.4 x 0.2 x 0.2  cm.  The specimen is submitted entirely in cassette [id number] Vazquez MS      Disclaimer 04/29/2022    Final                    Value:Unless the case is a 'gross only' or additional testing only, the final  diagnosis for each specimen is based on a microscopic examination of  appropriate tissue sections.           Medications  Outpatient Encounter Medications as of 9/20/2022   Medication Sig Dispense Refill    ascorbic acid, vitamin C, (VITAMIN C) 500 MG tablet Take 500 mg by mouth once daily.      cyanocobalamin (VITAMIN B-12) 1000 MCG tablet Take 2,500 mcg by mouth As instructed. Reduce to M-W-F      DUPIXENT  mg/2 mL PnIj Inject 300 mg into the skin every 14 (fourteen) days.      estradioL (ESTRACE) 0.01 % (0.1 mg/gram) vaginal cream Use 1 gram night for 14 days then twice weekly. 42.5 g 1    mupirocin (BACTROBAN) 2 % ointment Apply topically 3 (three) times daily. 30 g 0    sertraline (ZOLOFT) 100 MG tablet Take 1 tablet (100 mg total) by mouth once daily. 30 tablet 2    vitamin D (VITAMIN D3) 1000 units Tab Take 5,000 Units by mouth once daily.       No facility-administered encounter medications on file as of 9/20/2022.         Assessment - Diagnosis - Goals:     Impression: pt presents for intake following high anxiety/stress related to health and occupational stressors. Pt's symptoms are bordering on delusional.  Family has been very concerned and her sister in law reached out to schedule this appt for her.  Pt with limited progress with addition of Lexapro. Addition of Abilify last visits showing small benefit.  Now with titration of Lexapro and taking Abilify several months and taking medical leave of absence, the patient is improved. + wt gain on Abilify - request to wean off.  No significant decompensation off of Abilify.  Pt has returned to work, reports she " is doing fairly well despite stressors of job. Work performance is good.  Plans to retire in 2021, at her 28 yr donald. Requested to reduce lexapro in case it is causing skin symptoms, med changed to Sertraline. Has seen derm - dx with psoriasis,stress related.   Anxiety is increased related to school stressors.  Pt has now retired, reports she is much improved and has started to wean off of Sertraline.  Pt reports her sister in law passed away in interim which triggered an increase in anxiety and SCD/vertigo symptoms.     Bereavement   MDD, single episode,in full remission  Generalized Anxiety Disorder  Superior Canal Dehiscence, Psoriasis    GAF: 65  Strengths and Liabilities: Strength: Patient accepts guidance/feedback, Strength: Patient is expressive/articulate., Strength: Patient is intelligent.    Treatment Goals:  Specify outcomes written in observable, behavioral terms:   Anxiety: acquiring relapse prevention skills, reducing negative automatic thoughts, reducing physical symptoms of anxiety and reducing time spent worrying (<30 minutes/day)  Depression: acquiring relapse prevention skills, increasing energy, increasing interest in usual activities and increasing motivation    Treatment Plan/Recommendations:   Medication Management: The risks and benefits of medication were discussed with the patient.    - Increase Sertraline to 150 mg daily  - Ok to continue Xanax 0.5 mg daily prn anxiety - prescribed by Neurology; she is using sparingly.  Do not take with alcohol   - Pt instructed to go to ER with thoughts of harming self, others   - Call to report any worsening of symptoms or problems with the medication  - Psychotherapy is recommended; she has declined   - Discussed nonpharmacologic interventions for anxiety including regular exercise, healthy diet, practice of mindfulness, social relatedness  - follow up with Dermatology     Return to Clinic: about 2 months virtually        [facility]

## 2022-10-06 ENCOUNTER — TELEPHONE (OUTPATIENT)
Dept: PSYCHIATRY | Facility: CLINIC | Age: 52
End: 2022-10-06
Payer: COMMERCIAL

## 2022-10-06 NOTE — TELEPHONE ENCOUNTER
----- Message from Stephanie Mcpherson MD sent at 9/25/2022 10:02 PM CDT -----  2 months please, virtual ok

## 2022-11-07 ENCOUNTER — PATIENT MESSAGE (OUTPATIENT)
Dept: PSYCHIATRY | Facility: CLINIC | Age: 52
End: 2022-11-07
Payer: COMMERCIAL

## 2022-11-11 ENCOUNTER — OFFICE VISIT (OUTPATIENT)
Dept: PSYCHIATRY | Facility: CLINIC | Age: 52
End: 2022-11-11
Payer: COMMERCIAL

## 2022-11-11 ENCOUNTER — PATIENT MESSAGE (OUTPATIENT)
Dept: PSYCHIATRY | Facility: CLINIC | Age: 52
End: 2022-11-11
Payer: COMMERCIAL

## 2022-11-11 DIAGNOSIS — F41.1 GAD (GENERALIZED ANXIETY DISORDER): ICD-10-CM

## 2022-11-11 DIAGNOSIS — F33.42 MDD (MAJOR DEPRESSIVE DISORDER), RECURRENT, IN FULL REMISSION: ICD-10-CM

## 2022-11-11 PROCEDURE — 99999 PR PBB SHADOW E&M-EST. PATIENT-LVL I: CPT | Mod: PBBFAC,,, | Performed by: PSYCHIATRY & NEUROLOGY

## 2022-11-11 PROCEDURE — 99214 OFFICE O/P EST MOD 30 MIN: CPT | Mod: 95,,, | Performed by: PSYCHIATRY & NEUROLOGY

## 2022-11-11 PROCEDURE — 1160F PR REVIEW ALL MEDS BY PRESCRIBER/CLIN PHARMACIST DOCUMENTED: ICD-10-PCS | Mod: 95,CPTII,, | Performed by: PSYCHIATRY & NEUROLOGY

## 2022-11-11 PROCEDURE — 1159F MED LIST DOCD IN RCRD: CPT | Mod: 95,CPTII,, | Performed by: PSYCHIATRY & NEUROLOGY

## 2022-11-11 PROCEDURE — 99214 PR OFFICE/OUTPT VISIT, EST, LEVL IV, 30-39 MIN: ICD-10-PCS | Mod: 95,,, | Performed by: PSYCHIATRY & NEUROLOGY

## 2022-11-11 PROCEDURE — 1160F RVW MEDS BY RX/DR IN RCRD: CPT | Mod: 95,CPTII,, | Performed by: PSYCHIATRY & NEUROLOGY

## 2022-11-11 PROCEDURE — 99999 PR PBB SHADOW E&M-EST. PATIENT-LVL I: ICD-10-PCS | Mod: PBBFAC,,, | Performed by: PSYCHIATRY & NEUROLOGY

## 2022-11-11 PROCEDURE — 1159F PR MEDICATION LIST DOCUMENTED IN MEDICAL RECORD: ICD-10-PCS | Mod: 95,CPTII,, | Performed by: PSYCHIATRY & NEUROLOGY

## 2022-11-11 RX ORDER — LANOLIN ALCOHOL/MO/W.PET/CERES
400 CREAM (GRAM) TOPICAL DAILY
COMMUNITY
End: 2024-03-28

## 2022-11-11 RX ORDER — BUSPIRONE HYDROCHLORIDE 7.5 MG/1
TABLET ORAL
Qty: 90 TABLET | Refills: 0 | Status: SHIPPED | OUTPATIENT
Start: 2022-11-11 | End: 2022-12-07

## 2022-11-11 RX ORDER — ALPRAZOLAM 0.5 MG/1
0.5 TABLET ORAL 2 TIMES DAILY PRN
Qty: 60 TABLET | Refills: 0 | Status: SHIPPED | OUTPATIENT
Start: 2022-11-11

## 2022-11-11 NOTE — PROGRESS NOTES
Outpatient Psychiatry Follow Up Visit (MD/NP)    The patient location is: at home, address on [facility], [place], [place]  The chief complaint leading to consultation is: medication management    Visit type: audiovisual    Face to Face time with patient:  30 min  Thirty minutes of total time spent on the encounter, which includes face to face time and non-face to face time preparing to see the patient (eg, review of tests), Obtaining and/or reviewing separately obtained history, Documenting clinical information in the electronic or other health record, Independently interpreting results (not separately reported) and communicating results to the patient/family/caregiver, or Care coordination (not separately reported).     Each patient to whom he or she provides medical services by telemedicine is:  (1) informed of the relationship between the physician and patient and the respective role of any other health care provider with respect to management of the patient; and (2) notified that he or she may decline to receive medical services by telemedicine and may withdraw from such care at any time.      11/11/2022    Gaby Omar Álvarez, a 52 y.o. female, presenting for follow up visit. Met with patient alone     Reason for Encounter: self-referral. Patient complains of anxiety, depression.     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     Pt reports this medical stressor has occurred along with problems with her 22 yo daughter (not getting along with her father), her son graduating from high school.   She has taught x 23 years and she reports an incident occurred at school in the month of May which has caused her significant distress. She realized that during [employer] testing, there was something written on the wall that needed to be flipped over so  the student would not have the information during the testing.   Pt did flip the info over within a few minutes, but was very bothered that she had done this so she admitted her wrong doing to her principal.  Pt reports she ended up being written up - the first time this has ever happened to her which has been very distressing for her.  Additionally, she reports there was a meeting at school with the student and other team members and she was so concerned they would talk about her, that she decided to record the conversation on her phone.  She realized later that nothing actually did record on her phone, but she has lived in intense fear that she may lose her job.  She also feels her coworkers are treating her differently, and she is upset that no one has asked her how she is doing.   She denies hx of davina.   She denies hx of auditory/visual hallucinations. Pt is paranoid that coworkers are plotting against her and talking about her. She does feel friends and coworkers are talking about her and are plotting together.    Past Psychiatric History:  Prior diagnosis:  none  Inpatient psychiatric tx: none   Outpatient psychiatric tx:  None     Prior medications:   2005 prescribed something x 2 days - Cymbalta or Wellbutrin - sick to stomach, did not take it PCP  Lexapro, Clonazepam   Left work due to anxiety in 2010 - mother ill at time   Since under my care:  Abilify - weaned off, Sertraline   Prior suicide attempts: none  Prior hx self harm: none   Prior psychotherapy: none  Prior psychological testing:  None     Past medical history:  Superior canal dehiscence   Atopic dermatitis     INTERIM HISTORY:   Pt seen via telemedicine for convenience as pt lives apprx 45 miles from clinic.   She is taking Sertraline 100 mg daily.  Pt is not taking 150 mg daily - she took it maybe a week; unclear why she did not continue at this dosing.   She has had flare of SCD symptoms in interim, thought to be stress related.    Back on  betahistine BID since June.  Pt also had a MRI of brain done - no change.   She lost her sister in law in July to terminal cancer.  This has been very hard - pt has been supportive to her children who are young adults.   Every day, she feels better with vertigo, but still experiencing anxiety.   She had follow up appt with Dr. Bowden in November.  She will have VEMP testing, CT temporal, audiogram in 3 months.  She is also prescribed Xanax 0.5 mg which she takes on PRN basis in evening.   She is under care of [facility] and is taking Dupixent for atopic dermatitis.     Daughter wedding is scheduled for January   Son Bhargav is graduating - new job, chemical engineering (in [employer]) - he will be moving home soon.   Pt is supportive of her niece who lost her mother.  Niece is starting PA school. Her other daughter is pregnant and she hosted a baby shower for her.   Worries about her children, life in general - worries all the time.  Sertraline helps but not enough.   She also has noted heart rate variability with activity (typically HR is in 70s).  She has scheduled an appt with her 's Cardiologist, Dr. White.  Her dog just had 8 puppies she is caring for!   Not irritable. No panic attacks; no agoraphobia. She does clinch her jaw at night - no broken teeth.   Focus ok; spends a good amount of time working on art.   She has been feeling overwhelmed, her hands shake at times, she feels fatigued.   Mood is stable, she does not feel depressed.    Sleeps well.  Denies hopelessness/worthlessness.   Denies suicidal/homicidal ideations.  No self harm or violence.     Appetite: about the same     Last alcohol June 11 - she questions if it may have triggered vertigo   No caffeine   Denies substance use.     Medications:   Sertraline 100 mg daily   Xanax 0.5 mg daily prn anxiety - prescribed by Neurology     Review Of Systems:     GENERAL:  weight stable   CARDIOVASCULAR:  No chest pain, + heart rate variability.  "  MUSCULOSKELETAL:  No pain or stiffness of the joints  DERM: she reports dx is atopic dermatitis on fingers, elbows - improved   Neuro: SCD/vertigo   Current Evaluation:     Nutritional Screening: Considering the patient's height and weight, medications, medical history and preferences, should a referral be made to the dietitian? no    Constitutional  Vitals:  Most recent vital signs, dated more than 90 days prior to this appointment, were reviewed (not available)     General:  age appropriate, normal weight, well nourished, well dressed, neatly groomed, good eye contact      Musculoskeletal  Muscle Strength/Tone:  No tremor observed    Gait & Station:  Not able to assess virtually     Psychiatric  Speech:  no latency; no press   Mood & Affect:  "Overwhelmed"   Restricted    Thought Process:  Linear with questions    Associations:  No RAMAKRISHNA    Thought Content:  no suicidality, no homicidality, hallucinations: (auditory: no, visual: no), no paranoid ideations    Insight:  intact   Judgement: Intact    Orientation:  grossly intact, person, place, situation, time/date, day of week, month of year, year   Memory: intact for content of interview, able to remember recent events- no, able to remember remote events- yes   Language: grossly intact   Attention Span & Concentration:  able to focus   Fund of Knowledge:  intact and appropriate to age and level of education, familiar with aspects of current personal life       Functioning in Relationships:  Spouse/partner: supportive  Peers: limited   Employers:  Retired     Laboratory Data  No visits with results within 1 Month(s) from this visit.   Latest known visit with results is:   Procedure visit on 04/29/2022   Component Date Value Ref Range Status    Final Pathologic Diagnosis 04/29/2022    Final                    Value:VULVAR BIOPSY SHOWS MINIMAL HYPERKERATOSIS, NO EVIDENCE OF DYSPLASIA OR  LICHEN SCLEROSIS IDENTIFIED.  MULTIPLE LEVELS WERE REVIEWED.  PAS STAIN " "FOR  FUNGUS IS NEGATIVE.  THE CONTROL STAINS APPROPRIATELY.  NOTE:  WE ADVISE CORRELATION WITH CLINICAL FINDINGS.  THIS CASE WAS REVIEWED  BY DR. CLARK WHO CONCURS WITH THE DIAGNOSIS.      Gross 04/29/2022    Final                    Value:Pathology ID/Patient ID:  [id number]  The specimen is received in formalin labeled "vulvar bx".  The specimen  consists of 1 tan-yellow fragments of soft tissue measuring 0.4 x 0.2 x 0.2  cm.  The specimen is submitted entirely in cassette [id number]  SUE Ribera MS      Disclaimer 04/29/2022    Final                    Value:Unless the case is a 'gross only' or additional testing only, the final  diagnosis for each specimen is based on a microscopic examination of  appropriate tissue sections.           Medications  Outpatient Encounter Medications as of 11/11/2022   Medication Sig Dispense Refill    ALPRAZolam (XANAX) 0.5 MG tablet Take 0.5 mg by mouth nightly as needed. Vertigo      ascorbic acid, vitamin C, (VITAMIN C) 500 MG tablet Take 500 mg by mouth once daily.      betahistine HCl (BETAHISTINE, BULK, MISC) by Misc.(Non-Drug; Combo Route) route 2 (two) times a day.      cyanocobalamin (VITAMIN B-12) 1000 MCG tablet Take 2,500 mcg by mouth once daily.      DUPIXENT  mg/2 mL PnIj Inject 300 mg into the skin every 14 (fourteen) days.      magnesium oxide (MAG-OX) 400 mg (241.3 mg magnesium) tablet Take 400 mg by mouth.      sertraline (ZOLOFT) 100 MG tablet Take one tablet by mouth daily 30 tablet 2    vitamin D (VITAMIN D3) 1000 units Tab Take 5,000 Units by mouth once daily.      estradioL (ESTRACE) 0.01 % (0.1 mg/gram) vaginal cream Use 1 gram night for 14 days then twice weekly. (Patient not taking: Reported on 9/20/2022) 42.5 g 1    sertraline (ZOLOFT) 50 MG tablet Take 1 tablet (50 mg total) by mouth once daily. (Patient not taking: Reported on 11/11/2022) 30 tablet 2     No facility-administered encounter medications on file as of 11/11/2022. "         Assessment - Diagnosis - Goals:     Impression: pt presents for intake following high anxiety/stress related to health and occupational stressors. Pt's symptoms are bordering on delusional.  Family has been very concerned and her sister in law reached out to schedule this appt for her.  Pt with limited progress with addition of Lexapro. Addition of Abilify last visits showing small benefit.  Now with titration of Lexapro and taking Abilify several months and taking medical leave of absence, the patient is improved. + wt gain on Abilify - request to wean off.  No significant decompensation off of Abilify.  Pt has returned to work, reports she is doing fairly well despite stressors of job. Work performance is good.  Plans to retire in 2021, at her 28 yr donald. Requested to reduce lexapro in case it is causing skin symptoms, med changed to Sertraline. Has seen derm - dx with psoriasis,stress related.   Anxiety is increased related to school stressors.  Pt has now retired, reports she is much improved and has started to wean off of Sertraline.  Pt reports her sister in law passed away which triggered an increase in anxiety and SCD/vertigo symptoms.  Multiple stressors which are contributing to her feeling overwhelmed.     Bereavement   MDD, single episode,in full remission  Generalized Anxiety Disorder  Superior Canal Dehiscence, Psoriasis    GAF: 65  Strengths and Liabilities: Strength: Patient accepts guidance/feedback, Strength: Patient is expressive/articulate., Strength: Patient is intelligent.    Treatment Goals:  Specify outcomes written in observable, behavioral terms:   Anxiety: acquiring relapse prevention skills, reducing negative automatic thoughts, reducing physical symptoms of anxiety and reducing time spent worrying (<30 minutes/day)  Depression: acquiring relapse prevention skills, increasing energy, increasing interest in usual activities and increasing motivation    Treatment  Plan/Recommendations:   Medication Management: The risks and benefits of medication were discussed with the patient.    - Continue Sertraline 100 mg daily  - Add Buspirone 7.5 mg: Take one tablet PO twice daily for one week, then increase to one tablet by mouth three times daily  - Ok to continue Xanax 0.5 mg daily prn anxiety - prescribed by Neurology; she is using sparingly.  Do not take with alcohol   - Pt instructed to go to ER with thoughts of harming self, others   - Call to report any worsening of symptoms or problems with the medication  - Psychotherapy is recommended; she has declined   - Discussed nonpharmacologic interventions for anxiety including regular exercise, healthy diet, practice of mindfulness  - follow up with Dermatology, PCP, Neurology, upcoming appt with Cardiology    Return to Clinic: about 2 months virtually

## 2022-11-18 ENCOUNTER — TELEPHONE (OUTPATIENT)
Dept: PSYCHIATRY | Facility: CLINIC | Age: 52
End: 2022-11-18
Payer: COMMERCIAL

## 2022-11-18 NOTE — TELEPHONE ENCOUNTER
Called and scheduled virtual follow up on 01/31/2023 @ 9 AM.  Sent to Lacey for override to be scheduled

## 2022-11-18 NOTE — TELEPHONE ENCOUNTER
----- Message from Stephanie Mcpherson MD sent at 11/13/2022  9:48 PM CST -----  2 months, virtual please

## 2022-12-07 RX ORDER — BUSPIRONE HYDROCHLORIDE 7.5 MG/1
TABLET ORAL
Qty: 90 TABLET | Refills: 1 | Status: SHIPPED | OUTPATIENT
Start: 2022-12-07 | End: 2022-12-09

## 2022-12-07 NOTE — TELEPHONE ENCOUNTER
Darshan refill request on buspirone 7.5 mg  Last refill: 11/11  Last visit: 11/11  Follow up: 1/31/23

## 2022-12-09 ENCOUNTER — PATIENT MESSAGE (OUTPATIENT)
Dept: PSYCHIATRY | Facility: CLINIC | Age: 52
End: 2022-12-09
Payer: COMMERCIAL

## 2023-01-04 ENCOUNTER — PATIENT MESSAGE (OUTPATIENT)
Dept: PSYCHIATRY | Facility: CLINIC | Age: 53
End: 2023-01-04
Payer: COMMERCIAL

## 2023-01-20 DIAGNOSIS — Z12.31 SCREENING MAMMOGRAM, ENCOUNTER FOR: Primary | ICD-10-CM

## 2023-01-31 ENCOUNTER — OFFICE VISIT (OUTPATIENT)
Dept: PSYCHIATRY | Facility: CLINIC | Age: 53
End: 2023-01-31
Payer: COMMERCIAL

## 2023-01-31 DIAGNOSIS — F33.42 MDD (MAJOR DEPRESSIVE DISORDER), RECURRENT, IN FULL REMISSION: ICD-10-CM

## 2023-01-31 DIAGNOSIS — F41.1 GAD (GENERALIZED ANXIETY DISORDER): ICD-10-CM

## 2023-01-31 PROCEDURE — 99214 OFFICE O/P EST MOD 30 MIN: CPT | Mod: 95,,, | Performed by: PSYCHIATRY & NEUROLOGY

## 2023-01-31 PROCEDURE — 1159F MED LIST DOCD IN RCRD: CPT | Mod: CPTII,95,, | Performed by: PSYCHIATRY & NEUROLOGY

## 2023-01-31 PROCEDURE — 1160F PR REVIEW ALL MEDS BY PRESCRIBER/CLIN PHARMACIST DOCUMENTED: ICD-10-PCS | Mod: CPTII,95,, | Performed by: PSYCHIATRY & NEUROLOGY

## 2023-01-31 PROCEDURE — 1160F RVW MEDS BY RX/DR IN RCRD: CPT | Mod: CPTII,95,, | Performed by: PSYCHIATRY & NEUROLOGY

## 2023-01-31 PROCEDURE — 99214 PR OFFICE/OUTPT VISIT, EST, LEVL IV, 30-39 MIN: ICD-10-PCS | Mod: 95,,, | Performed by: PSYCHIATRY & NEUROLOGY

## 2023-01-31 PROCEDURE — 1159F PR MEDICATION LIST DOCUMENTED IN MEDICAL RECORD: ICD-10-PCS | Mod: CPTII,95,, | Performed by: PSYCHIATRY & NEUROLOGY

## 2023-01-31 RX ORDER — SERTRALINE HYDROCHLORIDE 100 MG/1
TABLET, FILM COATED ORAL
Qty: 90 TABLET | Refills: 2 | Status: SHIPPED | OUTPATIENT
Start: 2023-01-31 | End: 2023-11-08 | Stop reason: DRUGHIGH

## 2023-01-31 NOTE — PROGRESS NOTES
Outpatient Psychiatry Follow Up Visit (MD/NP)    The patient location is: at home, address on [address], [place], [place]  The chief complaint leading to consultation is: medication management    Visit type: audiovisual    Face to Face time with patient:  30 min  Thirty minutes of total time spent on the encounter, which includes face to face time and non-face to face time preparing to see the patient (eg, review of tests), Obtaining and/or reviewing separately obtained history, Documenting clinical information in the electronic or other health record, Independently interpreting results (not separately reported) and communicating results to the patient/family/caregiver, or Care coordination (not separately reported).     Each patient to whom he or she provides medical services by telemedicine is:  (1) informed of the relationship between the physician and patient and the respective role of any other health care provider with respect to management of the patient; and (2) notified that he or she may decline to receive medical services by telemedicine and may withdraw from such care at any time.      1/31/2023    Gaby Omar Álvarez, a 52 y.o. female, presenting for follow up visit. Met with patient alone     Reason for Encounter: self-referral. Patient complains of anxiety, depression.     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     Pt reports this medical stressor has occurred along with problems with her 24 yo daughter (not getting along with her father), her son graduating from high school.   She has taught x 23 years and she reports an incident occurred at school in the month of May which has caused her significant distress. She realized that during [employer] testing, there was something written on the wall that needed to be flipped over so  the student would not have the information during the testing.   Pt did flip the info over within a few minutes, but was very bothered that she had done this so she admitted her wrong doing to her principal.  Pt reports she ended up being written up - the first time this has ever happened to her which has been very distressing for her.  Additionally, she reports there was a meeting at school with the student and other team members and she was so concerned they would talk about her, that she decided to record the conversation on her phone.  She realized later that nothing actually did record on her phone, but she has lived in intense fear that she may lose her job.  She also feels her coworkers are treating her differently, and she is upset that no one has asked her how she is doing.   She denies hx of davina.   She denies hx of auditory/visual hallucinations. Pt is paranoid that coworkers are plotting against her and talking about her. She does feel friends and coworkers are talking about her and are plotting together.    Past Psychiatric History:  Prior diagnosis:  none  Inpatient psychiatric tx: none   Outpatient psychiatric tx:  None     Prior medications:   2005 prescribed something x 2 days - Cymbalta or Wellbutrin - sick to stomach, did not take it PCP  Lexapro, Clonazepam   Left work due to anxiety in 2010 - mother ill at time   Since under my care:  Abilify - weaned off, Sertraline   Prior suicide attempts: none  Prior hx self harm: none   Prior psychotherapy: none  Prior psychological testing:  None     Past medical history:  Superior canal dehiscence   Atopic dermatitis     INTERIM HISTORY:   Pt seen via telemedicine for convenience as pt lives apprx 45 miles from clinic.   She is taking Sertraline 100 mg daily.  Back on betahistine BID since June 2022 for SCD.  Pt also had a MRI of brain done - no change.  Pt opted not to take Buspirone.   She had follow up appt with Dr. Bowden in November.  She will have  "VEMP testing, CT head without contrast today.  She is also prescribed Xanax 0.5 mg which she takes on PRN basis in evening (rare). Vertigo has been mild.   She is under care of [facility] and is taking Dupixent for atopic dermatitis.   Her daughter was  last weekend.  Her son Bhargav has his own appt and is working as a chemical engineering (in [employer]).     Full cardiac work up in interim for heart palpitations - negative per pt.     Not irritable. No panic attacks; no agoraphobia. Anxiety is improved now that wedding planning is behind her.   Focus ok; spends a good amount of time working on art.   Mood is stable, she does not feel depressed.    Sleeps well.  Denies hopelessness/worthlessness.   Denies suicidal/homicidal ideations.  No self harm or violence.     Appetite: about the same     Rare alcohol   No caffeine   Denies substance use.     Medications:   Sertraline 100 mg daily   Xanax 0.5 mg daily prn anxiety - prescribed by Neurology     Review Of Systems:     GENERAL:  weight stable   CARDIOVASCULAR:  No chest pain or recent palpitations   MUSCULOSKELETAL:  No pain or stiffness of the joints  DERM: she reports dx is atopic dermatitis on fingers, elbows - improved   Neuro: SCD/vertigo   Current Evaluation:     Nutritional Screening: Considering the patient's height and weight, medications, medical history and preferences, should a referral be made to the dietitian? no    Constitutional  Vitals:  Most recent vital signs, dated more than 90 days prior to this appointment, were reviewed (not available)     General:  age appropriate, normal weight, well nourished, well dressed, neatly groomed, good eye contact      Musculoskeletal  Muscle Strength/Tone:  No tremor observed    Gait & Station:  Not able to assess virtually     Psychiatric  Speech:  no latency; no press   Mood & Affect:  "Good"  Full    Thought Process:  Linear with questions    Associations:  No RAMAKRISHNA    Thought Content:  no suicidality, " "no homicidality, hallucinations: (auditory: no, visual: no), no paranoid ideations    Insight:  intact   Judgement: Intact    Orientation:  grossly intact, person, place, situation, time/date, day of week, month of year, year   Memory: intact for content of interview, able to remember recent events- no, able to remember remote events- yes   Language: grossly intact   Attention Span & Concentration:  able to focus   Fund of Knowledge:  intact and appropriate to age and level of education, familiar with aspects of current personal life       Functioning in Relationships:  Spouse/partner: supportive  Peers: limited   Employers:  Retired     Laboratory Data  No visits with results within 1 Month(s) from this visit.   Latest known visit with results is:   Procedure visit on 04/29/2022   Component Date Value Ref Range Status    Final Pathologic Diagnosis 04/29/2022    Final                    Value:VULVAR BIOPSY SHOWS MINIMAL HYPERKERATOSIS, NO EVIDENCE OF DYSPLASIA OR  LICHEN SCLEROSIS IDENTIFIED.  MULTIPLE LEVELS WERE REVIEWED.  PAS STAIN FOR  FUNGUS IS NEGATIVE.  THE CONTROL STAINS APPROPRIATELY.  NOTE:  WE ADVISE CORRELATION WITH CLINICAL FINDINGS.  THIS CASE WAS REVIEWED  BY DR. CLARK WHO CONCURS WITH THE DIAGNOSIS.      Gross 04/29/2022    Final                    Value:Pathology ID/Patient ID:  [id number]  The specimen is received in formalin labeled "vulvar bx".  The specimen  consists of 1 tan-yellow fragments of soft tissue measuring 0.4 x 0.2 x 0.2  cm.  The specimen is submitted entirely in cassette [id number]  SUE Ribera MS      Disclaimer 04/29/2022    Final                    Value:Unless the case is a 'gross only' or additional testing only, the final  diagnosis for each specimen is based on a microscopic examination of  appropriate tissue sections.           Medications  Outpatient Encounter Medications as of 1/31/2023   Medication Sig Dispense Refill    ALPRAZolam (XANAX) 0.5 MG tablet Take 1 " tablet (0.5 mg total) by mouth 2 (two) times daily as needed for Anxiety (vertigo). 60 tablet 0    ascorbic acid, vitamin C, (VITAMIN C) 500 MG tablet Take 500 mg by mouth once daily.      betahistine HCl (BETAHISTINE, BULK, MISC) by Misc.(Non-Drug; Combo Route) route 2 (two) times a day.      cyanocobalamin (VITAMIN B-12) 1000 MCG tablet Take 2,500 mcg by mouth once daily.      DUPIXENT  mg/2 mL PnIj Inject 300 mg into the skin every 14 (fourteen) days.      magnesium oxide (MAG-OX) 400 mg (241.3 mg magnesium) tablet Take 400 mg by mouth.      sertraline (ZOLOFT) 100 MG tablet Take one tablet by mouth daily 30 tablet 2    vitamin D (VITAMIN D3) 1000 units Tab Take 5,000 Units by mouth once daily.       No facility-administered encounter medications on file as of 1/31/2023.         Assessment - Diagnosis - Goals:     Impression: pt presents for intake following high anxiety/stress related to health and occupational stressors. Pt's symptoms are bordering on delusional.  Family has been very concerned and her sister in law reached out to schedule this appt for her.  Pt with limited progress with addition of Lexapro. Addition of Abilify last visits showing small benefit.  Now with titration of Lexapro and taking Abilify several months and taking medical leave of absence, the patient is improved. + wt gain on Abilify - request to wean off.  No significant decompensation off of Abilify.  Pt has returned to work, reports she is doing fairly well despite stressors of job. Work performance is good.  Plans to retire in 2021, at her 28 yr donald. Requested to reduce lexapro in case it is causing skin symptoms, med changed to Sertraline. Has seen derm - dx with psoriasis,stress related.   Anxiety is increased related to school stressors.  Pt has now retired, reports she is much improved and has started to wean off of Sertraline.  Pt reports her sister in law passed away which triggered an increase in anxiety and  SCD/vertigo symptoms.  Multiple stressors which are contributing to her feeling overwhelmed - all have improved.  Pt reports she is improved.    MDD, single episode,in full remission  Generalized Anxiety Disorder  Superior Canal Dehiscence, Psoriasis    GAF: 65  Strengths and Liabilities: Strength: Patient accepts guidance/feedback, Strength: Patient is expressive/articulate., Strength: Patient is intelligent.    Treatment Goals:  Specify outcomes written in observable, behavioral terms:   Anxiety: acquiring relapse prevention skills, reducing negative automatic thoughts, reducing physical symptoms of anxiety and reducing time spent worrying (<30 minutes/day)  Depression: acquiring relapse prevention skills, increasing energy, increasing interest in usual activities and increasing motivation    Treatment Plan/Recommendations:   Medication Management: The risks and benefits of medication were discussed with the patient.    - Continue Sertraline 100 mg daily   - Ok to continue Xanax 0.5 mg daily prn anxiety - prescribed by Neurology; she is using sparingly.  Do not take with alcohol   - Pt instructed to go to ER with thoughts of harming self, others   - Call to report any worsening of symptoms or problems with the medication  - Psychotherapy is recommended; she has declined   - Discussed nonpharmacologic interventions for anxiety including regular exercise, healthy diet, practice of mindfulness  - follow up with Dermatology, PCP, Neurology     Return to Clinic: 8-9 months

## 2023-02-14 ENCOUNTER — PATIENT MESSAGE (OUTPATIENT)
Dept: PSYCHIATRY | Facility: CLINIC | Age: 53
End: 2023-02-14
Payer: COMMERCIAL

## 2023-02-14 ENCOUNTER — TELEPHONE (OUTPATIENT)
Dept: PSYCHIATRY | Facility: CLINIC | Age: 53
End: 2023-02-14
Payer: COMMERCIAL

## 2023-02-14 NOTE — TELEPHONE ENCOUNTER
----- Message from Stephanie Mcpherson MD sent at 2/5/2023 10:44 PM CST -----  RTC: 8-9  months please.  In clinic (will be > 1 yr since last in person visit)

## 2023-02-14 NOTE — TELEPHONE ENCOUNTER
Called patient no answer left message to call office also sent my chart message to contact us to get her 8-9 months follow up in clinic scheduled.

## 2023-02-22 ENCOUNTER — PATIENT MESSAGE (OUTPATIENT)
Dept: PSYCHIATRY | Facility: CLINIC | Age: 53
End: 2023-02-22
Payer: COMMERCIAL

## 2023-02-23 ENCOUNTER — HOSPITAL ENCOUNTER (OUTPATIENT)
Dept: RADIOLOGY | Facility: HOSPITAL | Age: 53
Discharge: HOME | End: 2023-02-23
Attending: FAMILY MEDICINE
Payer: COMMERCIAL

## 2023-02-23 DIAGNOSIS — Z12.31 SCREENING MAMMOGRAM, ENCOUNTER FOR: ICD-10-CM

## 2023-02-23 PROCEDURE — 77067 SCR MAMMO BI INCL CAD: CPT | Mod: 26,,, | Performed by: RADIOLOGY

## 2023-02-23 PROCEDURE — 77067 MAMMO DIGITAL SCREENING BILAT WITH TOMO: ICD-10-PCS | Mod: 26,,, | Performed by: RADIOLOGY

## 2023-02-23 PROCEDURE — 77063 BREAST TOMOSYNTHESIS BI: CPT | Mod: 26,,, | Performed by: RADIOLOGY

## 2023-02-23 PROCEDURE — 77067 SCR MAMMO BI INCL CAD: CPT | Mod: TC,PO

## 2023-02-23 PROCEDURE — 77063 MAMMO DIGITAL SCREENING BILAT WITH TOMO: ICD-10-PCS | Mod: 26,,, | Performed by: RADIOLOGY

## 2023-03-22 ENCOUNTER — TELEPHONE (OUTPATIENT)
Dept: GASTROENTEROLOGY | Facility: CLINIC | Age: 53
End: 2023-03-22
Payer: COMMERCIAL

## 2023-03-22 DIAGNOSIS — Z12.11 COLON CANCER SCREENING: Primary | ICD-10-CM

## 2023-03-22 NOTE — TELEPHONE ENCOUNTER
Endoscopy Scheduling Questionnaire:         Are you taking any blood thinners? no               If Yes  Have you been on them for longer than one year?     2. Have you been diagnosed with Diverticulitis in past three months?  no    3. Are you on dialysis or have Kidney Disease? no    4. Previous Colonoscopy?  no         If yes Do you have a history of colon polyps?        6. Are you a diabetic?  no    7. Do you have a history of constipation?  no      Procedure scheduled with Dr. Farrar  on  4/27/23    The prep being used is Golytely     The patient's prep instructions were sent by Portal

## 2023-03-22 NOTE — TELEPHONE ENCOUNTER
----- Message from Tracie M. Weil-Cavalier, RD sent at 3/22/2023  8:57 AM CDT -----  Regarding: Schedule coly - Dr Lu pt  TAMARA Lu patient. Needs referral and case request.     ----- Message -----  From: Indy Burns  Sent: 3/21/2023   1:04 PM CDT  To: [facility] Endoscopy Scheduling Staff    Type:  Needs Medical Advice    Who Called: pt  Symptoms (please be specific): pt is  requesting to get a return call to schedule for a colonoscopy     Would the patient rather a call back or a response via [facility]? call  Best Call Back Number:[phone]   Additional Information:

## 2023-03-23 RX ORDER — POLYETHYLENE GLYCOL 3350, SODIUM SULFATE ANHYDROUS, SODIUM BICARBONATE, SODIUM CHLORIDE, POTASSIUM CHLORIDE 236; 22.74; 6.74; 5.86; 2.97 G/4L; G/4L; G/4L; G/4L; G/4L
4 POWDER, FOR SOLUTION ORAL SEE ADMIN INSTRUCTIONS
Qty: 4000 ML | Refills: 0 | Status: ON HOLD | OUTPATIENT
Start: 2023-03-23 | End: 2023-04-27 | Stop reason: HOSPADM

## 2023-04-25 ENCOUNTER — TELEPHONE (OUTPATIENT)
Dept: ENDOSCOPY | Facility: HOSPITAL | Age: 53
End: 2023-04-25
Payer: COMMERCIAL

## 2023-04-25 NOTE — TELEPHONE ENCOUNTER
Spoke with patient about arrival time @ 4348.   Colon     Prep instructions reviewed: the day before the procedure, follow a clear liquid diet all day, then start the first 1/2 of prep at 5pm and take 2nd 1/2 of prep @ 0745.  Pt must be completely NPO when prep completed @ 0945.              Medications: Do not take Insulin or oral diabetic medications the day of the procedure.  Take as prescribed: heart, seizure and blood pressure medication in the morning with a sip of water (less than an ounce).  Take any breathing medications and bring inhalers to hospital with you Leave all valuables and jewelry at home.     Wear comfortable clothes to procedure to change into hospital gown You cannot drive for 24 hours after your procedure because you will receive sedation for your procedure to make you comfortable.  A ride must be provided at discharge.

## 2023-04-27 ENCOUNTER — ANESTHESIA (OUTPATIENT)
Dept: ENDOSCOPY | Facility: HOSPITAL | Age: 53
End: 2023-04-27
Payer: COMMERCIAL

## 2023-04-27 ENCOUNTER — ANESTHESIA EVENT (OUTPATIENT)
Dept: ENDOSCOPY | Facility: HOSPITAL | Age: 53
End: 2023-04-27
Payer: COMMERCIAL

## 2023-04-27 ENCOUNTER — HOSPITAL ENCOUNTER (OUTPATIENT)
Facility: HOSPITAL | Age: 53
Discharge: HOME | End: 2023-04-27
Attending: INTERNAL MEDICINE | Admitting: INTERNAL MEDICINE
Payer: COMMERCIAL

## 2023-04-27 VITALS
BODY MASS INDEX: 24.75 KG/M2 | HEART RATE: 68 BPM | RESPIRATION RATE: 14 BRPM | SYSTOLIC BLOOD PRESSURE: 97 MMHG | TEMPERATURE: 99 F | HEIGHT: 64 IN | WEIGHT: 145 LBS | DIASTOLIC BLOOD PRESSURE: 72 MMHG | OXYGEN SATURATION: 98 %

## 2023-04-27 DIAGNOSIS — Z12.11 COLON CANCER SCREENING: ICD-10-CM

## 2023-04-27 PROCEDURE — 63600175 PHARM REV CODE 636 W HCPCS: Performed by: NURSE ANESTHETIST, CERTIFIED REGISTERED

## 2023-04-27 PROCEDURE — G0121 COLON CA SCRN NOT HI RSK IND: HCPCS | Performed by: INTERNAL MEDICINE

## 2023-04-27 PROCEDURE — D9220A PRA ANESTHESIA: ICD-10-PCS | Mod: CRNA,,, | Performed by: NURSE ANESTHETIST, CERTIFIED REGISTERED

## 2023-04-27 PROCEDURE — D9220A PRA ANESTHESIA: Mod: ANES,,, | Performed by: ANESTHESIOLOGY

## 2023-04-27 PROCEDURE — G0121 COLON CA SCRN NOT HI RSK IND: HCPCS | Mod: ,,, | Performed by: INTERNAL MEDICINE

## 2023-04-27 PROCEDURE — 37000008 HC ANESTHESIA 1ST 15 MINUTES: Performed by: INTERNAL MEDICINE

## 2023-04-27 PROCEDURE — 37000009 HC ANESTHESIA EA ADD 15 MINS: Performed by: INTERNAL MEDICINE

## 2023-04-27 PROCEDURE — 25000003 PHARM REV CODE 250: Performed by: NURSE ANESTHETIST, CERTIFIED REGISTERED

## 2023-04-27 PROCEDURE — D9220A PRA ANESTHESIA: ICD-10-PCS | Mod: ANES,,, | Performed by: ANESTHESIOLOGY

## 2023-04-27 PROCEDURE — 25000003 PHARM REV CODE 250: Performed by: INTERNAL MEDICINE

## 2023-04-27 PROCEDURE — G0121 COLON CA SCRN NOT HI RSK IND: ICD-10-PCS | Mod: ,,, | Performed by: INTERNAL MEDICINE

## 2023-04-27 PROCEDURE — D9220A PRA ANESTHESIA: Mod: CRNA,,, | Performed by: NURSE ANESTHETIST, CERTIFIED REGISTERED

## 2023-04-27 RX ORDER — DEXTROMETHORPHAN/PSEUDOEPHED 2.5-7.5/.8
DROPS ORAL
Status: COMPLETED | OUTPATIENT
Start: 2023-04-27 | End: 2023-04-27

## 2023-04-27 RX ORDER — SODIUM CHLORIDE 9 MG/ML
INJECTION, SOLUTION INTRAVENOUS CONTINUOUS
Status: DISCONTINUED | OUTPATIENT
Start: 2023-04-27 | End: 2023-04-27 | Stop reason: HOSPADM

## 2023-04-27 RX ORDER — PROPOFOL 10 MG/ML
VIAL (ML) INTRAVENOUS CONTINUOUS PRN
Status: DISCONTINUED | OUTPATIENT
Start: 2023-04-27 | End: 2023-04-27

## 2023-04-27 RX ORDER — PROPOFOL 10 MG/ML
VIAL (ML) INTRAVENOUS
Status: DISCONTINUED | OUTPATIENT
Start: 2023-04-27 | End: 2023-04-27

## 2023-04-27 RX ORDER — LIDOCAINE HYDROCHLORIDE 20 MG/ML
INJECTION INTRAVENOUS
Status: DISCONTINUED | OUTPATIENT
Start: 2023-04-27 | End: 2023-04-27

## 2023-04-27 RX ADMIN — PROPOFOL 150 MCG/KG/MIN: 10 INJECTION, EMULSION INTRAVENOUS at 01:04

## 2023-04-27 RX ADMIN — LIDOCAINE HYDROCHLORIDE 40 MG: 20 INJECTION, SOLUTION INTRAVENOUS at 01:04

## 2023-04-27 RX ADMIN — PROPOFOL 20 MG: 10 INJECTION, EMULSION INTRAVENOUS at 01:04

## 2023-04-27 RX ADMIN — PROPOFOL 80 MG: 10 INJECTION, EMULSION INTRAVENOUS at 01:04

## 2023-04-27 RX ADMIN — SODIUM CHLORIDE: 0.9 INJECTION, SOLUTION INTRAVENOUS at 12:04

## 2023-04-27 NOTE — PROVATION PATIENT INSTRUCTIONS
Discharge Summary/Instructions after an Endoscopic Procedure  Patient Name: Gaby Álvarez  Patient MRN: [id number]  Patient YOB: 1970 Thursday, April 27, 2023  Adam Farrar MD  Dear patient,  As a result of recent federal legislation (The Federal Cures Act), you may   receive lab or pathology results from your procedure in your [facility]   account before your physician is able to contact you. Your physician or   their representative will relay the results to you with their   recommendations at their soonest availability.  Thank you,  Your health is very important to us during the Covid Crisis. Following your   procedure today, you will receive a daily text for 2 weeks asking about   signs or symptoms of Covid 19.  Please respond to this text when you   receive it so we can follow up and keep you as safe as possible.   RESTRICTIONS:  During your procedure today, you received medications for sedation.  These   medications may affect your judgment, balance and coordination.  Therefore,   for 24 hours, you have the following restrictions:   - DO NOT drive a car, operate machinery, make legal/financial decisions,   sign important papers or drink alcohol.    ACTIVITY:  Today: no heavy lifting, straining or running due to procedural   sedation/anesthesia.  The following day: return to full activity including work.  DIET:  Eat and drink normally unless instructed otherwise.     TREATMENT FOR COMMON SIDE EFFECTS:  - Mild abdominal pain, nausea, belching, bloating or excessive gas:  rest,   eat lightly and use a heating pad.  - Sore Throat: treat with throat lozenges and/or gargle with warm salt   water.  - Because air was used during the procedure, expelling large amounts of air   from your rectum or belching is normal.  - If a bowel prep was taken, you may not have a bowel movement for 1-3 days.    This is normal.  SYMPTOMS TO WATCH FOR AND REPORT TO YOUR PHYSICIAN:  1. Abdominal pain or bloating,  other than gas cramps.  2. Chest pain.  3. Back pain.  4. Signs of infection such as: chills or fever occurring within 24 hours   after the procedure.  5. Rectal bleeding, which would show as bright red, maroon, or black stools.   (A tablespoon of blood from the rectum is not serious, especially if   hemorrhoids are present.)  6. Vomiting.  7. Weakness or dizziness.  GO DIRECTLY TO THE NEAREST EMERGENCY ROOM IF YOU HAVE ANY OF THE FOLLOWING:      Difficulty breathing              Chills and/or fever over 101 F   Persistent vomiting and/or vomiting blood   Severe abdominal pain   Severe chest pain   Black, tarry stools   Bleeding- more than one tablespoon   Any other symptom or condition that you feel may need urgent attention  Your doctor recommends these additional instructions:  If any biopsies were taken, your doctors clinic will contact you in 1 to 2   weeks with any results.  - Discharge patient to home.   - Resume previous diet.   - Continue present medications.   - Repeat colonoscopy in 10 years for screening purposes.   - Patient has a contact number available for emergencies.  The signs and   symptoms of potential delayed complications were discussed with the   patient.  Return to normal activities tomorrow.  Written discharge   instructions were provided to the patient.  For questions, problems or results please call your physician - Adam Farrar MD.  EMERGENCY PHONE NUMBER: [phone],  LAB RESULTS: [phone]  IF A COMPLICATION OR EMERGENCY SITUATION ARISES AND YOU ARE UNABLE TO REACH   YOUR PHYSICIAN - GO DIRECTLY TO THE EMERGENCY ROOM.  Adam Farrar MD  4/27/2023 2:11:33 PM  This report has been verified and signed electronically.  Dear patient,  As a result of recent federal legislation (The Federal Cures Act), you may   receive lab or pathology results from your procedure in your [facility]   account before your physician is able to contact you. Your physician or    their representative will relay the results to you with their   recommendations at their soonest availability.  Thank you,  PROVATION

## 2023-04-27 NOTE — TRANSFER OF CARE
"Anesthesia Transfer of Care Note    Patient: Gaby Álvarez    Procedure(s) Performed: Procedure(s) (LRB):  COLONOSCOPY (N/A)    Patient location: GI    Anesthesia Type: general    Transport from OR: Transported from OR on room air with adequate spontaneous ventilation    Post pain: adequate analgesia    Post assessment: no apparent anesthetic complications and tolerated procedure well    Post vital signs: stable    Level of consciousness: responds to stimulation and sedated    Nausea/Vomiting: no nausea/vomiting    Complications: none    Transfer of care protocol was followed      Last vitals:   Visit Vitals  BP (!) 141/79 (BP Location: Left arm, Patient Position: Lying)   Pulse 86   Temp 37.4 °C (99.3 °F) (Temporal)   Resp 18   Ht 5' 4" (1.626 m)   Wt 65.8 kg (145 lb)   LMP 07/01/2017   SpO2 98%   Breastfeeding No   BMI 24.89 kg/m²     "

## 2023-04-27 NOTE — H&P
Short Stay Endoscopy History and Physical    PCP - Dominick Kaplan MD    Procedure - Colonoscopy  ASA - II  Mallampati - per anesthesia  History of Anesthesia problems - no  Family history Anesthesia problems - no     HPI:  This is a 53 y.o. female here for evaluation of : Colon cancer screening    Family history of colon cancer - no  History of polyps - na  Change in bowel habits - no  Rectal bleeding - no      ROS:  Constitutional: No fevers, chills, No weight loss  ENT: No allergies  CV: No chest pain  Pulm: No shortness of breath  GI: see HPI  Derm: No rash    Medical History:  has a past medical history of Anxiety disorder and Migraine.    Surgical History:  has a past surgical history that includes Gallbladder surgery and  section.    Family History: family history includes Cancer in her mother; Crohn's disease in her father; Liver disease in her father; Migraines in her mother.. Otherwise no colon cancer, inflammatory bowel disease, or GI malignancies.    Social History:  reports that she has never smoked. She has never used smokeless tobacco. She reports current alcohol use. She reports that she does not use drugs.    Review of patient's allergies indicates:   Allergen Reactions    Garamycin [gentamicin]        Medications:   Medications Prior to Admission   Medication Sig Dispense Refill Last Dose    ALPRAZolam (XANAX) 0.5 MG tablet Take 1 tablet (0.5 mg total) by mouth 2 (two) times daily as needed for Anxiety (vertigo). 60 tablet 0     ascorbic acid, vitamin C, (VITAMIN C) 500 MG tablet Take 500 mg by mouth once daily.       betahistine HCl (BETAHISTINE, BULK, MISC) by Misc.(Non-Drug; Combo Route) route 2 (two) times a day.       cyanocobalamin (VITAMIN B-12) 1000 MCG tablet Take 2,500 mcg by mouth once daily.       DUPIXENT  mg/2 mL PnIj Inject 300 mg into the skin every 14 (fourteen) days.       magnesium oxide (MAG-OX) 400 mg (241.3 mg magnesium) tablet Take 400 mg by mouth once daily.        polyethylene glycol (GOLYTELY) 236-22.74-6.74 -5.86 gram suspension Take 4,000 mLs (4 L total) by mouth As instructed. 4000 mL 0     sertraline (ZOLOFT) 100 MG tablet Take one tablet by mouth daily 90 tablet 2     vitamin D (VITAMIN D3) 1000 units Tab Take 5,000 Units by mouth once daily.            Objective Findings:    Vital Signs: see nursing notes  Physical Exam:  General Appearance: Well appearing in no acute distress  Eyes:    No scleral icterus  ENT: Neck supple  Lungs: CTA anteriorly  Heart:  S1, S2 normal, no murmurs heard  Abdomen: Soft, non tender, non distended with positive bowel sounds. No hepatosplenomegaly, ascites, or mass  Extremities: no edema  Skin: No rash      Labs:  No results found for: WBC, HGB, HCT, PLT, CHOL, TRIG, HDL, LDLDIRECT, ALT, AST, NA, K, CL, CREATININE, BUN, CO2, TSH, PSA, INR, GLUF, HGBA1C, MICROALBUR    I have explained the risks and benefits of endoscopy procedures to the patient including but not limited to bleeding, perforation, infection, and death.    Adam Farrar MD

## 2023-04-27 NOTE — ANESTHESIA PREPROCEDURE EVALUATION
04/27/2023  Gaby Álvarez is a 53 y.o., female.      Pre-op Assessment    I have reviewed the Patient Summary Reports.     I have reviewed the Nursing Notes. I have reviewed the NPO Status.   I have reviewed the Medications.     Review of Systems      Physical Exam    Airway:  Mallampati: II   Mouth Opening: Normal  Neck ROM: Normal ROM        Anesthesia Plan  Type of Anesthesia, risks & benefits discussed:    Anesthesia Type: Gen Natural Airway  Intra-op Monitoring Plan: Standard ASA Monitors  Post Op Pain Control Plan: multimodal analgesia  Induction:  IV  Informed Consent: Informed consent signed with the Patient and all parties understand the risks and agree with anesthesia plan.  All questions answered.   ASA Score: 1  Day of Surgery Review of History & Physical: H&P Update referred to the surgeon/provider.    Ready For Surgery From Anesthesia Perspective.     .

## 2023-04-28 NOTE — ANESTHESIA POSTPROCEDURE EVALUATION
Anesthesia Post Evaluation    Patient: Gaby Álvarez    Procedure(s) Performed: Procedure(s) (LRB):  COLONOSCOPY (N/A)    Final Anesthesia Type: general      Patient location during evaluation: PACU  Patient participation: Yes- Able to Participate  Level of consciousness: awake and alert  Post-procedure vital signs: reviewed and stable  Pain management: adequate  Airway patency: patent    PONV status at discharge: No PONV  Anesthetic complications: no      Cardiovascular status: stable  Respiratory status: spontaneous ventilation  Hydration status: euvolemic  Follow-up not needed.          Vitals Value Taken Time   BP 97/72 04/27/23 1432   Temp 36.9 °C (98.5 °F) 04/27/23 1402   Pulse 68 04/27/23 1432   Resp 14 04/27/23 1432   SpO2 98 % 04/27/23 1432         Event Time   Out of Recovery 14:33:17         Pain/Michelle Score: Michelle Score: 10 (4/27/2023  2:32 PM)

## 2023-11-08 ENCOUNTER — OFFICE VISIT (OUTPATIENT)
Dept: PSYCHIATRY | Facility: CLINIC | Age: 53
End: 2023-11-08
Payer: COMMERCIAL

## 2023-11-08 VITALS
HEIGHT: 64 IN | WEIGHT: 160.38 LBS | BODY MASS INDEX: 27.38 KG/M2 | DIASTOLIC BLOOD PRESSURE: 85 MMHG | SYSTOLIC BLOOD PRESSURE: 124 MMHG | HEART RATE: 68 BPM

## 2023-11-08 DIAGNOSIS — F41.1 GAD (GENERALIZED ANXIETY DISORDER): ICD-10-CM

## 2023-11-08 DIAGNOSIS — F33.42 MDD (MAJOR DEPRESSIVE DISORDER), RECURRENT, IN FULL REMISSION: ICD-10-CM

## 2023-11-08 PROCEDURE — 99999 PR PBB SHADOW E&M-EST. PATIENT-LVL III: CPT | Mod: PBBFAC,,, | Performed by: PSYCHIATRY & NEUROLOGY

## 2023-11-08 PROCEDURE — 3008F BODY MASS INDEX DOCD: CPT | Mod: CPTII,S$GLB,, | Performed by: PSYCHIATRY & NEUROLOGY

## 2023-11-08 PROCEDURE — 3008F PR BODY MASS INDEX (BMI) DOCUMENTED: ICD-10-PCS | Mod: CPTII,S$GLB,, | Performed by: PSYCHIATRY & NEUROLOGY

## 2023-11-08 PROCEDURE — 1159F PR MEDICATION LIST DOCUMENTED IN MEDICAL RECORD: ICD-10-PCS | Mod: CPTII,S$GLB,, | Performed by: PSYCHIATRY & NEUROLOGY

## 2023-11-08 PROCEDURE — 3074F SYST BP LT 130 MM HG: CPT | Mod: CPTII,S$GLB,, | Performed by: PSYCHIATRY & NEUROLOGY

## 2023-11-08 PROCEDURE — 3074F PR MOST RECENT SYSTOLIC BLOOD PRESSURE < 130 MM HG: ICD-10-PCS | Mod: CPTII,S$GLB,, | Performed by: PSYCHIATRY & NEUROLOGY

## 2023-11-08 PROCEDURE — 1159F MED LIST DOCD IN RCRD: CPT | Mod: CPTII,S$GLB,, | Performed by: PSYCHIATRY & NEUROLOGY

## 2023-11-08 PROCEDURE — 3079F PR MOST RECENT DIASTOLIC BLOOD PRESSURE 80-89 MM HG: ICD-10-PCS | Mod: CPTII,S$GLB,, | Performed by: PSYCHIATRY & NEUROLOGY

## 2023-11-08 PROCEDURE — 99214 PR OFFICE/OUTPT VISIT, EST, LEVL IV, 30-39 MIN: ICD-10-PCS | Mod: S$GLB,,, | Performed by: PSYCHIATRY & NEUROLOGY

## 2023-11-08 PROCEDURE — 99999 PR PBB SHADOW E&M-EST. PATIENT-LVL III: ICD-10-PCS | Mod: PBBFAC,,, | Performed by: PSYCHIATRY & NEUROLOGY

## 2023-11-08 PROCEDURE — 3079F DIAST BP 80-89 MM HG: CPT | Mod: CPTII,S$GLB,, | Performed by: PSYCHIATRY & NEUROLOGY

## 2023-11-08 PROCEDURE — 99214 OFFICE O/P EST MOD 30 MIN: CPT | Mod: S$GLB,,, | Performed by: PSYCHIATRY & NEUROLOGY

## 2023-11-08 RX ORDER — SERTRALINE HYDROCHLORIDE 25 MG/1
25 TABLET, FILM COATED ORAL DAILY
Qty: 30 TABLET | Refills: 0 | Status: SHIPPED | OUTPATIENT
Start: 2023-11-08 | End: 2024-03-07 | Stop reason: SDUPTHER

## 2023-11-08 RX ORDER — SERTRALINE HYDROCHLORIDE 50 MG/1
TABLET, FILM COATED ORAL
Qty: 90 TABLET | Refills: 0 | Status: SHIPPED | OUTPATIENT
Start: 2023-11-08 | End: 2024-02-05

## 2023-11-08 NOTE — PROGRESS NOTES
Outpatient Psychiatry Follow Up Visit (MD/NP)  11/8/2023    Gaby Álvarez, a 53 y.o. female, presenting for follow up visit. Met with patient alone     Reason for Encounter: self-referral. Patient complains of anxiety, depression.     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     Pt reports this medical stressor has occurred along with problems with her 22 yo daughter (not getting along with her father), her son graduating from high school.   She has taught x 23 years and she reports an incident occurred at school in the month of May which has caused her significant distress. She realized that during [employer] testing, there was something written on the wall that needed to be flipped over so the student would not have the information during the testing.   Pt did flip the info over within a few minutes, but was very bothered that she had done this so she admitted her wrong doing to her principal.  Pt reports she ended up being written up - the first time this has ever happened to her which has been very distressing for her.  Additionally, she reports there was a meeting at school with the student and other team members and she was so concerned they would talk about her, that she decided to record the conversation on her phone.  She realized later that nothing actually did record on her phone, but she has lived in intense fear that she may lose her job.  She also feels her coworkers are treating her differently, and she is upset that no one has asked her how she is doing.   She denies hx of davina.   She denies hx of auditory/visual hallucinations. Pt is paranoid that coworkers are plotting against her and talking about her. She does feel friends and coworkers are talking about her and are plotting together.    Past Psychiatric  "History:  Prior diagnosis:  none  Inpatient psychiatric tx: none   Outpatient psychiatric tx:  None     Prior medications:   2005 prescribed something x 2 days - Cymbalta or Wellbutrin - sick to stomach, did not take it PCP  Lexapro, Clonazepam   Left work due to anxiety in 2010 - mother ill at time   Since under my care:  Abilify - weaned off, Sertraline   Prior suicide attempts: none  Prior hx self harm: none   Prior psychotherapy: none  Prior psychological testing:  None     Past medical history:  Superior canal dehiscence   Atopic dermatitis   Body mass index is 27.53 kg/m².    INTERIM HISTORY:  Pt presents for follow up.  Last visit 1/2023.  She is taking Sertraline 100 mg daily.  Would like to consider lowering dose. She is taking betahistine for SCD and dupixent for atopic dermatitis.    She is under care of Dr. Bowden.  She is also prescribed Xanax 0.5 mg which she takes on PRN basis in evening (rare). Vertigo has been well controlled.   Her daughter was  in January, and son Bhargav will soon be proposing to his girlfriend.   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    Taking Betahistine - staying on med since it helps, only QHS.  Without it, may have dizziness; fewer episodes, not affected by movement.  No progression with imaging   Only time she is anxious is with vertigo - body tenses up, jaw.  Episodes last a few seconds. She is also more likely to have vertigo when anxious.   Feels her pulse - feels heart pounding, even in eye balls.  Even when relaxed, she is aware of heart beat.   Got a mouth guard from dentist - uses prn   Started yoga -> [employer]  (progressed to advanced).   Went to cardiologist - HR raised - stress test all normal.  Was told she is paying too much attention to heart rate.   "Life is good."  Misses frienships., losing her sister in law was very difficult.  Has come to terms with school situation, does not " "dwell on it.  Sees school out of her backyard, does not dwell on it anymore.  "It's all good."   Her art keeps her going!    has been having orthopedic issues.   Appetite high - + wt gain.   Body mass index is 27.53 kg/m².  Not irritable. No panic attacks; no agoraphobia. Focus ok; spends a good amount of time working on art.   Mood is stable, she does not feel depressed.  Future oriented - plans to start traveling, flying again.      Sleeps well.  Denies hopelessness/worthlessness.   Denies suicidal/homicidal ideations.  No self harm or violence.   Rare alcohol -infrequent wine   No caffeine, no soft drinks   Denies substance use.     Medications:   Sertraline 100 mg daily   Xanax 0.5 mg daily prn anxiety   Review Of Systems:     GENERAL:  weight stable   CARDIOVASCULAR:  No chest pain or recent palpitations   MUSCULOSKELETAL:  No pain or stiffness of the joints  DERM: she reports dx is atopic dermatitis on fingers, elbows - improved   Neuro: SCD/vertigo   Current Evaluation:     Nutritional Screening: Considering the patient's height and weight, medications, medical history and preferences, should a referral be made to the dietitian? no    Constitutional  Vitals:  Most recent vital signs, dated more than 90 days prior to this appointment, were reviewed (not available)     General:  age appropriate, normal weight, well nourished, well dressed, neatly groomed, good eye contact      Musculoskeletal  Muscle Strength/Tone:  No tremor observed    Gait & Station:  Not able to assess virtually     Psychiatric  Speech:  no latency; no press   Mood & Affect:  "Good"  Full    Thought Process:  Linear with questions    Associations:  No RAMAKRISHNA    Thought Content:  no suicidality, no homicidality, hallucinations: (auditory: no, visual: no), no paranoid ideations    Insight:  intact   Judgement: Intact    Orientation:  grossly intact, person, place, situation, time/date, day of week, month of year, year   Memory: intact for " content of interview, able to remember recent events- no, able to remember remote events- yes   Language: grossly intact   Attention Span & Concentration:  able to focus   Fund of Knowledge:  intact and appropriate to age and level of education, familiar with aspects of current personal life       Functioning in Relationships:  Spouse/partner: supportive  Peers: limited   Employers:  Retired     Medications  Outpatient Encounter Medications as of 11/8/2023   Medication Sig Dispense Refill    ascorbic acid, vitamin C, (VITAMIN C) 500 MG tablet Take 500 mg by mouth once daily.      betahistine HCl (BETAHISTINE, BULK, MISC) by Misc.(Non-Drug; Combo Route) route 2 (two) times a day.      cyanocobalamin (VITAMIN B-12) 1000 MCG tablet Take 2,500 mcg by mouth once daily.      DUPIXENT  mg/2 mL PnIj Inject 300 mg into the skin every 14 (fourteen) days.      magnesium oxide (MAG-OX) 400 mg (241.3 mg magnesium) tablet Take 400 mg by mouth once daily.      sertraline (ZOLOFT) 100 MG tablet Take one tablet by mouth daily 90 tablet 2    vitamin D (VITAMIN D3) 1000 units Tab Take 5,000 Units by mouth once daily.      ALPRAZolam (XANAX) 0.5 MG tablet Take 1 tablet (0.5 mg total) by mouth 2 (two) times daily as needed for Anxiety (vertigo). (Patient not taking: Reported on 11/8/2023) 60 tablet 0     No facility-administered encounter medications on file as of 11/8/2023.         Assessment - Diagnosis - Goals:     Impression: pt presents for intake following high anxiety/stress related to health and occupational stressors. Pt's symptoms are bordering on delusional.  Family has been very concerned and her sister in law reached out to schedule this appt for her.  Pt with limited progress with addition of Lexapro. Addition of Abilify last visits showing small benefit.  Now with titration of Lexapro and taking Abilify several months and taking medical leave of absence, the patient is improved. + wt gain on Abilify - request to  wean off.  No significant decompensation off of Abilify.  Pt has returned to work, reports she is doing fairly well despite stressors of job. Work performance is good.  Plans to retire in 2021, at her 28 yr donald. Requested to reduce lexapro in case it is causing skin symptoms, med changed to Sertraline. Has seen derm - dx with psoriasis,stress related.   Anxiety is increased related to school stressors.  Pt has now retired, reports she is much improved and has started to wean off of Sertraline.  Pt reports her sister in law passed away which triggered an increase in anxiety and SCD/vertigo symptoms.  Multiple stressors which are contributing to her feeling overwhelmed - all have improved.  Pt reports she is improved.    MDD, single episode,in full remission  Generalized Anxiety Disorder  Superior Canal Dehiscence, Psoriasis  Body mass index is 27.53 kg/m².    GAF: 70  Strengths and Liabilities: Strength: Patient accepts guidance/feedback, Strength: Patient is expressive/articulate., Strength: Patient is intelligent.    Treatment Goals:  Specify outcomes written in observable, behavioral terms:   Anxiety: acquiring relapse prevention skills, reducing negative automatic thoughts, reducing physical symptoms of anxiety and reducing time spent worrying (<30 minutes/day)  Depression: acquiring relapse prevention skills, increasing energy, increasing interest in usual activities and increasing motivation    Treatment Plan/Recommendations:   Medication Management: The risks and benefits of medication were discussed with the patient.    - Trial to lower Sertraline: 75 mg daily x 1 month, then 50 mg daily   - Ok to continue Xanax 0.5 mg daily prn anxiety; she is using sparingly.  Do not take with alcohol   - Pt instructed to go to ER with thoughts of harming self, others   - Call to report any worsening of symptoms or problems with the medication  - Psychotherapy has been recommended; she has declined   - Discussed  nonpharmacologic interventions for anxiety including regular exercise, healthy diet, practice of mindfulness; continue Yoga. Art has been therapeutic.   - follow up with Dermatology, PCP, Neurology     -Spent 30 min face to face with the pt; >50% time spent in counseling   -Supportive therapy and psychoeducation provided  -R/B/SE's of medications discussed with the pt who expresses understanding and chooses to take medications as prescribed.   -Pt instructed to call clinic, 911 or go to nearest emergency room if sxs worsen or pt is in   crisis. The pt expresses understanding.      Return to Clinic: March 2024

## 2024-02-23 DIAGNOSIS — Z12.31 SCREENING MAMMOGRAM, ENCOUNTER FOR: Primary | ICD-10-CM

## 2024-02-27 NOTE — PROGRESS NOTES
INTENSIVE CARE UNIT  Resident Physician Progress Note    Patient - Jules Nichols  Date of Admission -  2/21/2024 10:41 PM  Date of Evaluation -  2/27/2024  Room and Bed Number -  3015/3015-01   Hospital Day - 6    Chief Complaint   Patient presents with    Wound Infection     R. axilla, chest, L axilla, L neck      SUBJECTIVE:     TODAY:    Patient examined at bedside today.  Labs and chart reviewed.  Vitals reviewed.    Patient has no concerns and complaints,   Had about 6 lose stools since yesterday.   Off pressors, on Room air    JAY on CKD stage IV getting better.  Fluids were discontinued yesterday as per nephrology.    On midodrine 10 mg TID   Made 3 L urine in last 24 hours.   Has right IJ HD for last 4 days.     Blood sugars better than yesterday.  On lantus 20 units daily.  On medium dose sliding scale insulin.    ID recommended to continue Unasyn likely until 3/4.  Cultures from the wound show streptococci, beta-hemolytic group B, S.aureus, Prevotella,bacteroides    Brief History:   Jules Nichols is a 42 y.o. male presented to outside facility with shortness of breath and worsening wounds.  Patient has a history of hypertension, diabetes, hyperlipidemia, chronic kidney disease with baseline creatinine around 3.  Patient also has a history of hidradenitis patient reportedly previously followed with wound care, however has not since then.  Patient states she has had worsening wounds over the last 6 months, however it has acutely worsened in the last few weeks.  No fevers or chills.     Patient had CT imaging done at outside hospital that showed necrotizing fasciitis in the axilla and chest as well as Dago's gangrene.  Patient also found to be in acute renal failure with creatinine of 8 at outside facility.  Patient was fluid resuscitated with 3 L total of IV fluids.  Received antibiotics with vancomycin, cefepime, clindamycin.     AWAKE & FOLLOWING COMMANDS:  [] No   [x] Yes    SECRETIONS Amount:  []  Outpatient Psychiatry Follow Up Visit (MD/NP)    8/20/2018    Gaby Álvarez, a 48 y.o. female, presenting for follow up evaluation visit. Met with patient and her .      Reason for Encounter: self-referral. Patient complains of anxiety, depression.     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  I have not met patient before and do not feel this is a conflict of information.     Pt reports hx of a vestibular disorder, called superior canal dehiscence which is currently being evaluated.  She recently changed providers to a Neurotologist.in [place], Dr Zavaleta whom she saw today.  She is concerned because he does not file insurance and she will have to pay out of pocket for vestibular testing.  Pt reports symptoms of intense vertigo/dizziness which initially started 4/2017.  She reports symptoms were abrupt, unable to ambulate, was in bed for weeks.  She has been taking betahistine BID with some improvement, but has had some improvement in her balance, fullness in her ears.  She tried injections first but did not tolerate it and it caused high anxiety.  Pt reports her previous Neurologist Dr Rosa had recommended that she have a craniotomy which has caused her considerable stress.   Pt has been taking Clonazepam 0.25 mg nightly, but decided to stop it most of June and did not do well (very anxious, depressed). She is back to taking it 2-3 times a week. She did not experience any w/d symptoms.  Dr Zavaleta prescribed today Lexapro 10 mg - she believes to start taking 5 mg daily for unclear time duration (Rx in car, I tried to call patient to have her review it, but she could not understand it).     Pt reports this medical stressor has occurred along with problems with her 22 yo daughter (not getting along with her father), her son graduating  from high school.   She has taught x 23 years and she reports an incident occurred at school in the month of May which has caused her significant distress. She realized that during [employer] testing, there was something written on the wall that needed to be flipped over so the student would not have the information during the testing.   Pt did flip the info over within a few minutes, but was very bothered that she had done this so she admitted her wrong doing to her principal.  Pt reports she ended up being written up - the first time this has ever happened to her which has been very distressing for her.  Additionally, she reports there was a meeting at school with the student and other team members and she was so concerned they would talk about her, that she decided to record the conversation on her phone.  She realized later that nothing actually did record on her phone, but she has lived in intense fear that she may lose her job.  She also feels her coworkers are treating her differently, and she is upset that no one has asked her how she is doing.     Pt endorses the following:     MDE: depressed mood, anhedonia, hopelessness, feeling tired, worthlessness, + guilt.  poor focus. She has had decreased self care, excessive sleep.  Denies suicidal/homicidal ideations.  Appetite is ok.  PHQ-9: 12 (extremely difficult)  + isolative behavior. Pt is fearful about going back to school, especially team meeting in about 2 weeks.     IVONNE: Pt endorses feeling anxious, being unable to control anxiety, worrying excessively, trouble relaxing, irritability, restlessness. IVONNE-7: 12.  She denies symptoms of OCD    She denies hx of davina.  MDQ: 2 yes: irritability, easily distracted - minor problems.     Pt denies hx of violence, no HI.   She denies hx of auditory/visual hallucinations. Pt is paranoid that coworkers are plotting against her and talking about her. She does feel friends and coworkers are talking about her and are plotting  General: No scleral icterus.  Cardiovascular:      Heart sounds: Normal heart sounds.   Pulmonary:      Breath sounds: Normal breath sounds.   Abdominal:      General: Abdomen is flat. There is no distension.      Palpations: Abdomen is soft. There is no mass.      Tenderness: There is no abdominal tenderness.   Musculoskeletal:         General: No swelling or tenderness.      Right lower leg: No edema.      Left lower leg: No edema.   Skin:     General: Skin is warm and dry.      Comments: Appropriately covered wounds   Neurological:      Mental Status: He is alert and oriented to person, place, and time.   Psychiatric:         Mood and Affect: Mood normal.       MEDICATIONS:  Scheduled Meds:   insulin lispro  0-8 Units SubCUTAneous TID WC    insulin lispro  0-4 Units SubCUTAneous Nightly    sodium hypochlorite   Irrigation Daily    insulin glargine  20 Units SubCUTAneous Daily    midodrine  10 mg Oral TID WC    ampicillin-sulbactam  3,000 mg IntraVENous Q12H    pantoprazole  40 mg Oral QAM AC    sodium chloride flush  5-40 mL IntraVENous 2 times per day    heparin (porcine)  5,000 Units SubCUTAneous 3 times per day     Continuous Infusions:   sodium chloride      dextrose      sodium chloride Stopped (02/25/24 0911)     PRN Meds:   sodium chloride, , PRN  oxyCODONE, 5 mg, Q4H PRN  glucose, 4 tablet, PRN  dextrose bolus, 125 mL, PRN   Or  dextrose bolus, 250 mL, PRN  glucagon (rDNA), 1 mg, PRN  dextrose, , Continuous PRN  sodium chloride flush, 5-40 mL, PRN  sodium chloride, , PRN  ondansetron, 4 mg, Q8H PRN   Or  ondansetron, 4 mg, Q6H PRN  polyethylene glycol, 17 g, Daily PRN  acetaminophen, 650 mg, Q6H PRN   Or  acetaminophen, 650 mg, Q6H PRN  heparin (porcine), 1,500 Units, PRN  heparin (porcine), 1,400 Units, PRN         Additional Respiratory Assessments  Pulse: 81  Respirations: 19  SpO2: 96 %      Lab Results   Component Value Date/Time    FIO2 21.0 02/22/2024 05:54 AM         DATA:  Complete Blood Count:  together.    Past Psychiatric History:  Prior diagnosis:  none  Inpatient psychiatric tx: none   Outpatient psychiatric tx:  None     Prior medications:   prescribed something x 2 days - Cymbalta or Wellbutrin - sick to stomach, did not take it PCP  Left work due to anxiety in  - mother ill. 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, congenital  Worst panic attack ever with DH Maneveur      Hx TBI: none   Hx seizures: none         Past Surgical History:  GALLBLADDER SURGERY[[id number]]       SECTION[[id number]                Family History:     Suicides:none   Substance abuse:  None   Bipolar Disorder:  None   Anxiety: father - anxieety   Depression: None       Social History:  Childhood: [place] up in [place], 2 brother, middle child, 10 months older than younger brother, close to older brother, lost both parents 2010 mom, dad 11 months later   Marital status:  Children:   Resides: lives with  of 26 yrs, 24 yo daughter (), 18 yo - [employer], greatkkid, honor roll   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  She has taken a half of Clonazepam a few times in interim, including the night she returned for a school meeting for the leadership team.  She is in charge of hospitality.      She feels she has not moved on.  Went to lunch with a friend.  Enjoyed it, but then strained.  She knows her reputation has been smeared.  Could not find a binder, issues with cellphones last year, people clearing throat.   She was always a leader, taken off of committees.    She is very guarded;  At work,  asked her what's wrong.  Still dealing with neuro issue.      She feels like she wants to reture and get away;  Reputation destroyed.  He tells her don't worry about it, but she feels no one has her back.   She gets up in am, very hard, dreads going to work.  Crying spells are there, not as much as summer. Tired, despite sleeping well. Appetite is up, eating wrong things.  +  Wt gain. + fatigue.  + hopelessness   She is taking betahistine which is helping.  Rare wine, salt - makes her ears feel full.  Feels loss of confidence.  Son got to college, misses him a lot.  Coming home this weekend.   Does not feel like anyone has her back.     thinks she is paranoid.    In May, e-mail flooded job advertisments in her area.  She thinks that one of girlfriends knew her passwords.   No davina  Denies auditory/visual hallucinations  Denies suicidal/homicidal ideations.    Per  Ford: pt is paranoia.  Thought her symptoms were fading with medications.  Pt in tears in am.  avign a very hard time going to work.   Not better.  Some improvement, going back to school has been extremely hard  She feels everyone is against her "down to the ."  People everyone are involved and conspiring against her.   Pt guilts herself and is unable to see alternatives for recent concerns.  Pt always positive - symptoms are very out of character for her.     Pt is worried about her  - feels that she is a  burden  Clonazepam - " "stopped it, it was making her feel more down.    + Sadness. + Anxious feeling in stomach.   No panic attacks.  + Diarrhea.    Worries so much about her reputation being ruined.  States she lives in a small community.   Sister in law came over this weekend, pt stayed in [facility] all day.   Looks forward to weekends, work week is very hard - she is considering taking a leave, but is undecided.   Can focus with her students, this allows her to cope; she is completely at ease at work.   Boss sent her a card of positive sentiments, pt interpreted it that she was letting her know to move on.     Pt reports her coworkers were still treating her differently,  Noticed people were smirking at her.  She still worries about possibly losing her job.      Pt does not plan to return to the doctor she saw for second opinion for her vestibular condition.  She will resume care with Dr Rosa.   Does not have an appt scheduled at this time.     Review Of Systems:     GENERAL:  + weight gain   SKIN:  No rashes or lacerations  MOUTH & THROAT:  No dyskinetic movements or obvious goiter  CHEST:  No shortness of breath, hyperventilation or cough  CARDIOVASCULAR:  No tachycardia or chest pain  MUSCULOSKELETAL:  No pain or stiffness of the joints  NEUROLOGIC:  dizziness improved   Current Evaluation:     Nutritional Screening: Considering the patient's height and weight, medications, medical history and preferences, should a referral be made to the dietitian? no    Constitutional  Vitals:  Most recent vital signs, dated less than 90 days prior to this appointment, were not reviewed (not available)  Vitals:    08/20/18 0808   BP: 118/81   Pulse: 79   Weight: 61.4 kg (135 lb 5.8 oz)   Height: 5' 5" (1.651 m)        General:  age appropriate, normal weight, well nourished, well dressed, neatly groomed     Musculoskeletal  Muscle Strength/Tone:  no tremor   Gait & Station:  non-ataxic     Psychiatric  Speech:  no latency; no press   Mood & Affect:  " anxious  mood-congruent, anxious appearing    Thought Process:  illogical    Associations:  No RAMAKRISHNA    Thought Content:  no suicidality, no homicidality, delusions, hallucinations: (auditory: no, visual: no), paranoid   Insight:  fair   Judgement: fair    Orientation:  grossly intact, person, place, situation, time/date, day of week, month of year, year   Memory: intact for content of interview, memory >3 objects at five mins, able to remember recent events- no, able to remember remote events- yes   Language: grossly intact   Attention Span & Concentration:  able to focus   Fund of Knowledge:  intact and appropriate to age and level of education, familiar with aspects of current personal life       Relevant Elements of Neurological Exam: normal gait    Functioning in Relationships:  Spouse/partner: supportive, encouraged her to get help  Peers: limited   Employers: + conflcits     Laboratory Data  No visits with results within 1 Month(s) from this visit.   Latest known visit with results is:   No results found for any previous visit.         Medications  Outpatient Encounter Medications as of 8/20/2018   Medication Sig Dispense Refill    betahistine HCl (BETAHISTINE, BULK, MISC) 1 capsule by Misc.(Non-Drug; Combo Route) route 2 (two) times daily. Its a compounded drug made into capsule 12 mg      escitalopram oxalate (LEXAPRO) 10 MG tablet Take one tablet by mouth daily 30 tablet 2    clonazePAM (KLONOPIN) 0.5 MG tablet Take 0.25 mg by mouth every evening. patietn only taking half pill trying to wean off       No facility-administered encounter medications on file as of 8/20/2018.            Assessment - Diagnosis - Goals:     Impression: pt presents for intake following high anxiety/stress related to health and occupational stressors. Pt's symptoms are bordering on delusional.  Family has been very concerned and her sister in law reached out to schedule this appt for her.  Pt with very limited progress with  addition of Lexapro     MDD, single episode, severe, with psychotic features   R/O Delusional Disorder   Generalized Anxiety Disorder  Superior Canal Dehiscence     GAF: 50    Strengths and Liabilities: Strength: Patient accepts guidance/feedback, Strength: Patient is expressive/articulate., Strength: Patient is intelligent.    Treatment Goals:  Specify outcomes written in observable, behavioral terms:   Anxiety: acquiring relapse prevention skills, reducing negative automatic thoughts, reducing physical symptoms of anxiety and reducing time spent worrying (<30 minutes/day)  Depression: acquiring relapse prevention skills, increasing energy, increasing interest in usual activities and increasing motivation    Treatment Plan/Recommendations:   · Medication Management: The risks and benefits of medication were discussed with the patient.    - Continue Lexapro 10 mg daily - consider titration next visit   - Clonazepam 0.25 mg nightly PRN anxiety. Discussed risk of decreased RT, sedation, addictive potential, and not to mix with alcohol.   - Add Abilify 2 mg daily for paranoia, augment treatment of mood symptoms.  Typical TENISHA's reviewed including weight gain, abnormal movements, EPS, TD, metabolic side effects.   - Pt instructed to go to ER with thoughts of harming self, others   - Call to report any worsening of symptoms or problems with the medication  - discussed referral to psychotherapy - will defer more to future visit   - discussed potential leave of absence - she will talk it over with her  and let me know if she would like to proceed. 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

## 2024-03-07 ENCOUNTER — OFFICE VISIT (OUTPATIENT)
Dept: PSYCHIATRY | Facility: CLINIC | Age: 54
End: 2024-03-07
Payer: COMMERCIAL

## 2024-03-07 DIAGNOSIS — F41.1 GAD (GENERALIZED ANXIETY DISORDER): ICD-10-CM

## 2024-03-07 PROCEDURE — 1160F RVW MEDS BY RX/DR IN RCRD: CPT | Mod: CPTII,95,, | Performed by: PSYCHIATRY & NEUROLOGY

## 2024-03-07 PROCEDURE — 1159F MED LIST DOCD IN RCRD: CPT | Mod: CPTII,95,, | Performed by: PSYCHIATRY & NEUROLOGY

## 2024-03-07 PROCEDURE — 99214 OFFICE O/P EST MOD 30 MIN: CPT | Mod: 95,,, | Performed by: PSYCHIATRY & NEUROLOGY

## 2024-03-07 RX ORDER — SERTRALINE HYDROCHLORIDE 25 MG/1
TABLET, FILM COATED ORAL
Start: 2024-03-07 | End: 2024-06-12

## 2024-03-07 NOTE — PROGRESS NOTES
Outpatient Psychiatry Follow Up Visit (MD/NP)  03/07/2024     Gaby Álvarez, a 53 y.o. female, presenting for follow up visit. Met with patient alone     Reason for Encounter: self-referral. Patient complains of anxiety, depression.     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     Pt reports this medical stressor has occurred along with problems with her 24 yo daughter (not getting along with her father), her son graduating from high school.   She has taught x 23 years and she reports an incident occurred at school in the month of May which has caused her significant distress. She realized that during [employer] testing, there was something written on the wall that needed to be flipped over so the student would not have the information during the testing.   Pt did flip the info over within a few minutes, but was very bothered that she had done this so she admitted her wrong doing to her principal.  Pt reports she ended up being written up - the first time this has ever happened to her which has been very distressing for her.  Additionally, she reports there was a meeting at school with the student and other team members and she was so concerned they would talk about her, that she decided to record the conversation on her phone.  She realized later that nothing actually did record on her phone, but she has lived in intense fear that she may lose her job.  She also feels her coworkers are treating her differently, and she is upset that no one has asked her how she is doing.   She denies hx of davina.   She denies hx of auditory/visual hallucinations. Pt is paranoid that coworkers are plotting against her and talking about her. She does feel friends and coworkers are talking about her and are plotting together.    Past Psychiatric  History:  Prior diagnosis:  none  Inpatient psychiatric tx: none   Outpatient psychiatric tx:  None     Prior medications:   2005 prescribed something x 2 days - Cymbalta or Wellbutrin - sick to stomach, did not take it PCP  Lexapro, Clonazepam   Left work due to anxiety in 2010 - mother ill at time   Since under my care:  Abilify - weaned off, Sertraline   Prior suicide attempts: none  Prior hx self harm: none   Prior psychotherapy: none  Prior psychological testing:  None     Past medical history:  Superior canal dehiscence   Atopic dermatitis   Body mass index is 27.53 kg/m².    INTERIM HISTORY:  Pt presents for follow up. She is taking betahistine less often for SCD and dupixent for atopic dermatitis.  She has begun weaning Sertraline and is taking 50 mg every other night for about 2-3 weeks without any mood changes.   She is under care of Dr. Bowden.  She is also prescribed Xanax 0.5 mg which she takes on PRN basis in evening (rare). Vertigo has been fairly well controlled.   Her daughter was  in January, and son Bhargav will soon be getting  in [place].   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    Got a mouth guard from dentist - uses prn   Started yoga -> [employer]  (progressed to advanced).   Not irritable. No panic attacks; no agoraphobia. Focus ok; spends a good amount of time working on art.   Mood is stable, she does not feel depressed.  Future oriented - plans to start traveling, flying again.   She is taking a trip to [place] for [employer] game - may take a Xanax for flight.   Would like to wean off of Sertraline.  Hosted an art party with some of the teachers from her school where she taught and she did so well - no issues.     Sleeps well.  Working out, been painting, August 24th wedding son, getting  in [place], [place]  Arms have been itching - driving her crazy -no new lotions, creams, no changes in soap, on  "dupixent for atopic dermatitis   Went to dermatologist   Liver issues in family   Feeling good   Had blood work all normal, bilirubin normal   She has not taken betahistine in 2 weeks; no visible rash - itching may have been related   Had gallbladder removed previously     Denies hopelessness/worthlessness.   Denies suicidal/homicidal ideations.  No self harm or violence.   Rare alcohol -infrequent wine   No caffeine, no soft drinks   Denies substance use.     Medications:   Sertraline 50 mg every other day    Xanax 0.5 mg daily prn anxiety   Review Of Systems:     GENERAL:  weight stable   CARDIOVASCULAR:  No chest pain or recent palpitations   MUSCULOSKELETAL:  No pain or stiffness of the joints  DERM: she reports dx is atopic dermatitis on fingers, elbows - had pruritus on her UE - recent labs were normal run by Cardiologist including metabolic panel, liver functions   Neuro: SCD/vertigo   Current Evaluation:     Nutritional Screening: Considering the patient's height and weight, medications, medical history and preferences, should a referral be made to the dietitian? no    Constitutional  Vitals:  Most recent vital signs, dated more than 90 days prior to this appointment, were reviewed (not available)     General:  age appropriate, normal weight, well nourished, well dressed, neatly groomed, good eye contact      Musculoskeletal  Muscle Strength/Tone:  No tremor observed    Gait & Station:  Not able to assess virtually     Psychiatric  Speech:  no latency; no press   Mood & Affect:  "Good"  Full    Thought Process:  Linear with questions    Associations:  No RAMAKRISHNA    Thought Content:  no suicidality, no homicidality, hallucinations: (auditory: no, visual: no), no paranoid ideations    Insight:  intact   Judgement: Intact    Orientation:  grossly intact, person, place, situation, time/date, day of week, month of year, year   Memory: intact for content of interview, able to remember recent events- no, able to " remember remote events- yes   Language: grossly intact   Attention Span & Concentration:  able to focus   Fund of Knowledge:  intact and appropriate to age and level of education, familiar with aspects of current personal life       Functioning in Relationships:  Spouse/partner: supportive  Peers: limited   Employers:  Retired     Medications  Outpatient Encounter Medications as of 11/8/2023   Medication Sig Dispense Refill    ascorbic acid, vitamin C, (VITAMIN C) 500 MG tablet Take 500 mg by mouth once daily.      betahistine HCl (BETAHISTINE, BULK, MISC) by Misc.(Non-Drug; Combo Route) route 2 (two) times a day.      cyanocobalamin (VITAMIN B-12) 1000 MCG tablet Take 2,500 mcg by mouth once daily.      DUPIXENT  mg/2 mL PnIj Inject 300 mg into the skin every 14 (fourteen) days.      magnesium oxide (MAG-OX) 400 mg (241.3 mg magnesium) tablet Take 400 mg by mouth once daily.      sertraline (ZOLOFT) 100 MG tablet Take one tablet by mouth daily 90 tablet 2    vitamin D (VITAMIN D3) 1000 units Tab Take 5,000 Units by mouth once daily.      ALPRAZolam (XANAX) 0.5 MG tablet Take 1 tablet (0.5 mg total) by mouth 2 (two) times daily as needed for Anxiety (vertigo). (Patient not taking: Reported on 11/8/2023) 60 tablet 0     No facility-administered encounter medications on file as of 11/8/2023.         Assessment - Diagnosis - Goals:     Impression: pt presents for intake following high anxiety/stress related to health and occupational stressors. Pt's symptoms are bordering on delusional.  Family has been very concerned and her sister in law reached out to schedule this appt for her.  Pt with limited progress with addition of Lexapro. Addition of Abilify last visits showing small benefit.  Now with titration of Lexapro and taking Abilify several months and taking medical leave of absence, the patient is improved. + wt gain on Abilify - request to wean off.  No significant decompensation off of Abilify.  Pt has  returned to work, reports she is doing fairly well despite stressors of job. Work performance is good.  Plans to retire in 2021, at her 28 yr donald. Requested to reduce lexapro in case it is causing skin symptoms, med changed to Sertraline. Has seen derm - dx with psoriasis,stress related.   Anxiety is increased related to school stressors.  Pt has now retired, reports she is much improved and has started to wean off of Sertraline.  Pt reports her sister in law passed away which triggered an increase in anxiety and SCD/vertigo symptoms.  Multiple stressors which are contributing to her feeling overwhelmed - all have improved.  Pt reports she is improved. She has begun weaning Sertraline.     MDD, single episode,in full remission  Generalized Anxiety Disorder  Superior Canal Dehiscence, Psoriasis  Body mass index is 27.53 kg/m².    GAF: 70  Strengths and Liabilities: Strength: Patient accepts guidance/feedback, Strength: Patient is expressive/articulate., Strength: Patient is intelligent.    Treatment Goals:  Specify outcomes written in observable, behavioral terms:   Anxiety: acquiring relapse prevention skills, reducing negative automatic thoughts, reducing physical symptoms of anxiety and reducing time spent worrying (<30 minutes/day)  Depression: acquiring relapse prevention skills, increasing energy, increasing interest in usual activities and increasing motivation    Treatment Plan/Recommendations:   Medication Management: The risks and benefits of medication were discussed with the patient.    - Trial to lower Sertraline: to 25 mg every other day for rest of March (3 weeks), then ok to stop Sertraline if tolerating well.   - Ok to continue Xanax 0.5 mg daily prn anxiety; she is using sparingly.  Do not take with alcohol   - Pt instructed to go to ER with thoughts of harming self, others   - Call to report any worsening of symptoms or problems with the medication  - Psychotherapy has been recommended; she has  declined   - Discussed nonpharmacologic interventions for anxiety including regular exercise, healthy diet, practice of mindfulness; continue Yoga. Art has been therapeutic.   - follow up with Dermatology, PCP, Neurology     -Spent 30 min face to face with the pt; >50% time spent in counseling   -Supportive therapy and psychoeducation provided  -R/B/SE's of medications discussed with the pt who expresses understanding and chooses to take medications as prescribed.   -Pt instructed to call clinic, 911 or go to nearest emergency room if sxs worsen or pt is in   crisis. The pt expresses understanding.      Return to Clinic:  pt to send message by next month; will schedule PRN

## 2024-03-25 ENCOUNTER — HOSPITAL ENCOUNTER (OUTPATIENT)
Dept: RADIOLOGY | Facility: HOSPITAL | Age: 54
Discharge: HOME | End: 2024-03-25
Attending: FAMILY MEDICINE
Payer: COMMERCIAL

## 2024-03-25 DIAGNOSIS — Z12.31 SCREENING MAMMOGRAM, ENCOUNTER FOR: ICD-10-CM

## 2024-03-25 PROCEDURE — 77067 SCR MAMMO BI INCL CAD: CPT | Mod: TC,PN

## 2024-03-25 PROCEDURE — 77067 SCR MAMMO BI INCL CAD: CPT | Mod: 26,,, | Performed by: RADIOLOGY

## 2024-03-25 PROCEDURE — 77063 BREAST TOMOSYNTHESIS BI: CPT | Mod: 26,,, | Performed by: RADIOLOGY

## 2024-03-28 ENCOUNTER — PATIENT MESSAGE (OUTPATIENT)
Dept: PSYCHIATRY | Facility: CLINIC | Age: 54
End: 2024-03-28
Payer: COMMERCIAL

## 2024-06-12 ENCOUNTER — PATIENT MESSAGE (OUTPATIENT)
Dept: PSYCHIATRY | Facility: CLINIC | Age: 54
End: 2024-06-12
Payer: COMMERCIAL

## 2024-06-17 RX ORDER — BUSPIRONE HYDROCHLORIDE 5 MG/1
5 TABLET ORAL 2 TIMES DAILY
Qty: 60 TABLET | Refills: 1 | Status: SHIPPED | OUTPATIENT
Start: 2024-06-17 | End: 2024-08-16

## 2024-06-28 ENCOUNTER — PATIENT MESSAGE (OUTPATIENT)
Dept: PSYCHIATRY | Facility: CLINIC | Age: 54
End: 2024-06-28
Payer: COMMERCIAL

## 2024-07-01 RX ORDER — SERTRALINE HYDROCHLORIDE 100 MG/1
50 TABLET, FILM COATED ORAL DAILY
COMMUNITY

## 2024-07-29 ENCOUNTER — PATIENT MESSAGE (OUTPATIENT)
Dept: PSYCHIATRY | Facility: CLINIC | Age: 54
End: 2024-07-29
Payer: COMMERCIAL

## 2024-07-29 DIAGNOSIS — F41.1 GAD (GENERALIZED ANXIETY DISORDER): Primary | ICD-10-CM

## 2024-07-31 RX ORDER — SERTRALINE HYDROCHLORIDE 50 MG/1
50 TABLET, FILM COATED ORAL DAILY
Qty: 30 TABLET | Refills: 2 | Status: SHIPPED | OUTPATIENT
Start: 2024-07-31 | End: 2025-07-31

## 2024-07-31 RX ORDER — SERTRALINE HYDROCHLORIDE 25 MG/1
25 TABLET, FILM COATED ORAL DAILY
Qty: 30 TABLET | Refills: 2 | Status: SHIPPED | OUTPATIENT
Start: 2024-07-31 | End: 2025-07-31

## 2024-08-19 ENCOUNTER — OFFICE VISIT (OUTPATIENT)
Dept: PSYCHIATRY | Facility: CLINIC | Age: 54
End: 2024-08-19
Payer: COMMERCIAL

## 2024-08-19 DIAGNOSIS — F33.42 MDD (MAJOR DEPRESSIVE DISORDER), RECURRENT, IN FULL REMISSION: ICD-10-CM

## 2024-08-19 DIAGNOSIS — F41.1 GAD (GENERALIZED ANXIETY DISORDER): ICD-10-CM

## 2024-08-19 PROCEDURE — 1160F RVW MEDS BY RX/DR IN RCRD: CPT | Mod: CPTII,95,, | Performed by: PSYCHIATRY & NEUROLOGY

## 2024-08-19 PROCEDURE — 1159F MED LIST DOCD IN RCRD: CPT | Mod: CPTII,95,, | Performed by: PSYCHIATRY & NEUROLOGY

## 2024-08-19 PROCEDURE — 99214 OFFICE O/P EST MOD 30 MIN: CPT | Mod: 95,,, | Performed by: PSYCHIATRY & NEUROLOGY

## 2024-08-19 RX ORDER — SERTRALINE HYDROCHLORIDE 100 MG/1
100 TABLET, FILM COATED ORAL DAILY
Qty: 90 TABLET | Refills: 0 | Status: SHIPPED | OUTPATIENT
Start: 2024-08-19 | End: 2024-11-17

## 2024-08-19 RX ORDER — ALPRAZOLAM 0.25 MG/1
TABLET ORAL
Qty: 30 TABLET | Refills: 0 | Status: SHIPPED | OUTPATIENT
Start: 2024-08-19

## 2024-08-19 NOTE — PROGRESS NOTES
Outpatient Psychiatry Follow Up Visit (MD/NP)  08/19/2024  The patient location is: [address] [place] [address]  The chief complaint leading to consultation is: anxiety     Visit type: audiovisual    Face to Face time with patient: 30 mins  35 minutes of total time spent on the encounter, which includes face to face time and non-face to face time preparing to see the patient (eg, review of tests), Obtaining and/or reviewing separately obtained history, Documenting clinical information in the electronic or other health record, Independently interpreting results (not separately reported) and communicating results to the patient/family/caregiver, or Care coordination (not separately reported).     Each patient to whom he or she provides medical services by telemedicine is:  (1) informed of the relationship between the physician and patient and the respective role of any other health care provider with respect to management of the patient; and (2) notified that he or she may decline to receive medical services by telemedicine and may withdraw from such care at any time.    Gaby Omar Álvarez, a 54 y.o. female, presenting for follow up visit. Met with patient alone     Reason for Encounter: self-referral. Patient complains of anxiety, depression.     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     Pt reports this medical stressor has occurred along with problems with her 22 yo daughter (not getting along with her father), her son graduating from high school.   She has taught x 23 years and she reports an incident occurred at school in the month of May which has caused her significant distress. She realized that during [employer] testing, there was something written on the wall that needed to be flipped over so the student would not  have the information during the testing.   Pt did flip the info over within a few minutes, but was very bothered that she had done this so she admitted her wrong doing to her principal.  Pt reports she ended up being written up - the first time this has ever happened to her which has been very distressing for her.  Additionally, she reports there was a meeting at school with the student and other team members and she was so concerned they would talk about her, that she decided to record the conversation on her phone.  She realized later that nothing actually did record on her phone, but she has lived in intense fear that she may lose her job.  She also feels her coworkers are treating her differently, and she is upset that no one has asked her how she is doing.   She denies hx of davina.   She denies hx of auditory/visual hallucinations. Pt is paranoid that coworkers are plotting against her and talking about her. She does feel friends and coworkers are talking about her and are plotting together.    Past Psychiatric History:  Prior diagnosis:  none  Inpatient psychiatric tx: none   Outpatient psychiatric tx:  None     Prior medications:   2005 prescribed something x 2 days - Cymbalta or Wellbutrin - sick to stomach, did not take it PCP  Lexapro, Clonazepam   Left work due to anxiety in 2010 - mother ill at time   Since under my care:  Abilify - weaned off, Sertraline   Prior suicide attempts: none  Prior hx self harm: none   Prior psychotherapy: none  Prior psychological testing:  None     Past medical history:  Superior canal dehiscence   Atopic dermatitis       INTERIM HISTORY:  Pt presents for follow up.   Med issues: SCD and atopic dermatitis.   She is under care of Dr. Bowden.  Had worsening vertigo in interim.   She tried Buspirone x 1 week, really brought her down, crying, it was rough.  Doing better on Sertraline 75 mg daily (resumed in interim).  A world of difference on this med.   Wedding is this weekend  "on the beach in [place].   A lot to do before they leave Wed.   Later in the week after wedding, they are flying to [place] for the [facility] Season opener.   Would like to have Xanax for the flight - has not flown in a while.   Started to have balance issues again in late May.  She had eaten food high in sodium which she feels may have triggered this.  Had also stopped betahistine.  Has resumed it BID (cut back from TID).  She had vestibular therapy which really helped.  Seen by NP last month.  Has an appt in October with Dr Rosa, neurontologist.  Hopes she has no issues with flying, was advised to use Afrin prior to flight.  Pt is hoping to avoid surgery unless she continues to have episodes.  Fine now, watching what she eats.  Watching sodium intake.   Was completely off Sertraline from Feb to May.   Did not feel anxious - balance issues started and this brought her anxiety back.  She could not lay flat in bed.  When she gets vertigo, it triggers panic attacks.   Sertraline 100 mg was her ideal dose.   Mood is stable.  Motivated, doing her art.   Would like to start Sertraline 100 mg tonight.   Sleep and appetite are all normal.    has hunting firearms "this is not even a thought in my brain."   Reports worry is under control.  Feels anxiety is at normal dosimng.    Denies hopelessness/worthlessness.   Denies suicidal/homicidal ideations.    THE [place] PROTOCOL SUICIDE SCREEN  I[]I Patient unable or unwilling to participate.    Always ask questions 1 and 2:   I[]I Y - Low Risk  I[x]I N  1) Wish To Be Dead: In the past month, have you wished you were dead or wished you could go to sleep and not wake up?: **   I[]I Y - Low Risk  I[x]I N  2) Non-Specific Active Suicidal Thoughts: In the past month, have you actually had any thoughts about killing yourself?: **    If YES to 2, ask questions 3, 4, 5, 6 and 7:  If NO to 2, skip to question 7:    I[]I Y - Moderate Risk  I[]I N  I[]I N/A  3) Active Suicidal " Ideation with Any Methods (Not Plan) without Intent to Act: In the past month, have you been thinking about how you might do this?        I[]I Y - High Risk          I[]I N  I[]I N/A  4) Active Suicidal Ideation with Some Intent to Act, without Specific Plan: In the past month, have you had these thoughts and had some intention of acting on them?: **  I[]I Y - High Risk          I[]I N  I[]I N/A  5) Active Suicidal Ideation with Specific Plan: In the past month, have you started to work out or already worked out the details of how to kill yourself?: **  I[]I Y - High Risk          I[]I N  I[]I N/A  6) Active Suicidal Ideation with Specific Plan and Intent: DO YOU INTEND TO CARRY OUT THIS PLAN?: **    Always Ask Question 7:   Suicidal Behavior: Have you done anything, started to do anything, or prepared to do anything to end your life...  I[]I Y - Moderate Risk  I[x]I N  Ever in your lifetime?: **  I[]I Y - High Risk          I[x]I N  In the past 3 months?: **    Examples: Collected pills, obtained a gun, gave away valuables, wrote a will or suicide note, took out pills but didn't swallow any, held a gun but changed mind or it was grabbed from hand, went to the roof but didn't jump; or actually took pills, tried to shoot self, cut self, tried to hang self, etc.      NOTE: The use of standardized rating scales and safety contracts can aid in risk assessment, but do not substitute for clinical judgment. My clinical assessment is in agreement with the [place] Protocol Suicide Screen.    No self harm or violence.   Rare alcohol -infrequent wine   No caffeine, no soft drinks   Denies substance use.     Medications:   Sertraline 75 mg every other day    Xanax 0.5 mg BID prn anxiety   Review Of Systems:     GENERAL:  weight stable   CARDIOVASCULAR:  No chest pain or recent palpitations   MUSCULOSKELETAL:  No pain or stiffness of the joints  DERM: atopic dermatitis  Neuro: SCD/vertigo   Current Evaluation:     Nutritional  "Screening: Considering the patient's height and weight, medications, medical history and preferences, should a referral be made to the dietitian? no    Constitutional  Vitals:  Most recent vital signs, dated more than 90 days prior to this appointment, were reviewed (not available)     General:  age appropriate, normal weight, well nourished, well dressed, neatly groomed, good eye contact      Musculoskeletal  Muscle Strength/Tone:  No tremor observed    Gait & Station:  Not able to assess virtually     Psychiatric  Speech:  no latency; no press   Mood & Affect:  "Good"  Full    Thought Process:  Linear with questions    Associations:  No RAMAKRISHNA    Thought Content:  no suicidality, no homicidality, hallucinations: (auditory: no, visual: no), no paranoid ideations    Insight:  intact   Judgement: Intact    Orientation:  grossly intact, person, place, situation, time/date, day of week, month of year, year   Memory: intact for content of interview, able to remember recent events- no, able to remember remote events- yes   Language: grossly intact   Attention Span & Concentration:  able to focus   Fund of Knowledge:  intact and appropriate to age and level of education, familiar with aspects of current personal life       Functioning in Relationships:  Spouse/partner: supportive  Peers: limited   Employers:  Retired     Medications  Outpatient Encounter Medications as of 8/19/2024   Medication Sig Dispense Refill    ALPRAZolam (XANAX) 0.5 MG tablet Take 1 tablet (0.5 mg total) by mouth 2 (two) times daily as needed for Anxiety (vertigo). (Patient not taking: Reported on 11/8/2023) 60 tablet 0    ascorbic acid, vitamin C, (VITAMIN C) 500 MG tablet Take 500 mg by mouth once daily.      betahistine HCl (BETAHISTINE, BULK, MISC) by Misc.(Non-Drug; Combo Route) route 2 (two) times a day.      cyanocobalamin (VITAMIN B-12) 1000 MCG tablet Take 2,500 mcg by mouth once daily.      DUPIXENT  mg/2 mL PnIj Inject 300 mg into " the skin every 14 (fourteen) days.      MAGNESIUM GLYCINATE ORAL Take by mouth every evening.      sertraline (ZOLOFT) 25 MG tablet Take 1 tablet (25 mg total) by mouth once daily. 30 tablet 2    sertraline (ZOLOFT) 50 MG tablet Take 1 tablet (50 mg total) by mouth once daily. 30 tablet 2    vitamin D (VITAMIN D3) 1000 units Tab Take 5,000 Units by mouth once daily.      [DISCONTINUED] sertraline (ZOLOFT) 100 MG tablet Take 50 mg by mouth once daily.       No facility-administered encounter medications on file as of 8/19/2024.         Assessment - Diagnosis - Goals:     Impression: pt presents for intake following high anxiety/stress related to health and occupational stressors. Pt's symptoms are bordering on delusional.  Family has been very concerned and her sister in law reached out to schedule this appt for her.  Pt with limited progress with addition of Lexapro. Addition of Abilify last visits showing small benefit.  Now with titration of Lexapro and taking Abilify several months and taking medical leave of absence, the patient is improved. + wt gain on Abilify - request to wean off.  No significant decompensation off of Abilify.  Pt has returned to work, reports she is doing fairly well despite stressors of job. Work performance is good.  Plans to retire in 2021, at her 28 yr donald. Requested to reduce lexapro in case it is causing skin symptoms, med changed to Sertraline. Has seen derm - dx with psoriasis,stress related.   Anxiety is increased related to school stressors.  Pt has now retired, reports she is much improved and has started to wean off of Sertraline.  Pt reports her sister in law passed away which triggered an increase in anxiety and SCD/vertigo symptoms.  Multiple stressors which are contributing to her feeling overwhelmed - all have improved.  She tapered off of Sertraline; had flare of vertigo in interim with anxiety. Improved with resumption of Sertraline.     MDD, single episode,in full  remission  Generalized Anxiety Disorder  Superior Canal Dehiscence, Psoriasis      GAF: 60  Strengths and Liabilities: Strength: Patient accepts guidance/feedback, Strength: Patient is expressive/articulate., Strength: Patient is intelligent.    Treatment Goals:  Specify outcomes written in observable, behavioral terms:   Anxiety: acquiring relapse prevention skills, reducing negative automatic thoughts, reducing physical symptoms of anxiety and reducing time spent worrying (<30 minutes/day)  Depression: acquiring relapse prevention skills, increasing energy, increasing interest in usual activities and increasing motivation    Treatment Plan/Recommendations:   Medication Management: The risks and benefits of medication were discussed with the patient.    - Increase Sertraline to 100 mg daily   - Pt requested refill of Xanax for flight; will fill 0.25 - 0.5 mg daily p.r.n. anxiety.  Discussed risk of decreased RT, sedation, addictive potential, and not to mix with alcohol.  [place] prescription monitoring program reviewed.  - Pt instructed to go to ER with thoughts of harming self, others   - Call to report any worsening of symptoms or problems with the medication  - Psychotherapy has been recommended; she has declined   - Discussed nonpharmacologic interventions for anxiety including regular exercise, healthy diet, practice of mindfulness; continue Yoga. Art has been therapeutic.   - follow up with Dermatology, PCP, Neurology     IVONNE (generalized anxiety disorder)  -     ALPRAZolam (XANAX) 0.25 MG tablet; Take one to two tablets po daily prn anxiety  Dispense: 30 tablet; Refill: 0  -     sertraline (ZOLOFT) 100 MG tablet; Take 1 tablet (100 mg total) by mouth once daily.  Dispense: 90 tablet; Refill: 0    MDD (major depressive disorder), recurrent, in full remission        -Spent 30 min face to face with the pt; >50% time spent in counseling   -Supportive therapy and psychoeducation provided  -R/B/SE's of  "medications discussed with the pt who expresses understanding and chooses to take medications as prescribed.   -Pt instructed to call clinic, 911 or go to nearest emergency room if sxs worsen or pt is in   crisis. The pt expresses understanding.      Return to Clinic:  2 months     Buspirone x 1 week, really brought her down, crying, it was rough.  Doing better on Sertraline 75 mg daily (resumed in interim).  A world of difference on this med.   Wedding is this weekend on the beach in [place].   A lot to do before they leave Wed.   Later in the week after wedding, they are flying to [place] for the \[facility]\"" Season opener.   Would like to have Xanax for the flight - has not flown in a while.   Started to have balance issues again in late May.  She had eaten food high in sodium which she feels may have triggered this.  Had also stopped betahistine.  Has resumed it BID (cut back from TID).  She had vestibular therapy which really helped.  Seen by NP last month.  Has an appt in October with Dr Rosa, neurontologist.  Hopes she has no issues with flying, was advised to use Afrin prior to flight.  Pt is hoping to avoid surgery unless she continues to have episodes.  Fine now, watching what she eats.  Watching sodium intake.   Was completely off Sertraline from Feb to May.   Did not feel anxious - balance issues started and this brought her anxiety back.  She could not lay flat in bed.  When she gets vertigo, it triggers panic attacks.   Sertraline 100 mg was her ideal dose.   Mood is stable.  Motivated, doing her art.   Plans to start Sertraline 100 mg tonight.     Sleep and appetite are all normal.    has hunting firearms "this is not even a thought in my brain."   Reports worry is under control.  Feels anxiety is at normal dosimng.  "

## 2024-08-22 PROBLEM — F33.42 MDD (MAJOR DEPRESSIVE DISORDER), RECURRENT, IN FULL REMISSION: Status: ACTIVE | Noted: 2024-08-22

## 2024-08-22 PROBLEM — F33.0 MDD (MAJOR DEPRESSIVE DISORDER), RECURRENT EPISODE, MILD: Status: RESOLVED | Noted: 2020-03-01 | Resolved: 2024-08-22

## 2024-08-28 ENCOUNTER — TELEPHONE (OUTPATIENT)
Dept: PSYCHIATRY | Facility: CLINIC | Age: 54
End: 2024-08-28
Payer: COMMERCIAL

## 2024-08-28 NOTE — TELEPHONE ENCOUNTER
----- Message from Stephanie Mcpherson MD sent at 8/22/2024  8:42 PM CDT -----  Two months please, patient preference.  Thank you

## 2024-11-13 ENCOUNTER — OFFICE VISIT (OUTPATIENT)
Dept: PSYCHIATRY | Facility: CLINIC | Age: 54
End: 2024-11-13
Payer: COMMERCIAL

## 2024-11-13 DIAGNOSIS — F33.42 MDD (MAJOR DEPRESSIVE DISORDER), RECURRENT, IN FULL REMISSION: ICD-10-CM

## 2024-11-13 DIAGNOSIS — F41.1 GAD (GENERALIZED ANXIETY DISORDER): ICD-10-CM

## 2024-11-13 PROCEDURE — 99214 OFFICE O/P EST MOD 30 MIN: CPT | Mod: 95,,, | Performed by: PSYCHIATRY & NEUROLOGY

## 2024-11-13 RX ORDER — ALPRAZOLAM 0.25 MG/1
TABLET ORAL
Qty: 30 TABLET | Refills: 1 | Status: SHIPPED | OUTPATIENT
Start: 2024-11-13

## 2024-11-13 RX ORDER — SERTRALINE HYDROCHLORIDE 25 MG/1
25 TABLET, FILM COATED ORAL NIGHTLY
Qty: 90 TABLET | Refills: 0 | Status: SHIPPED | OUTPATIENT
Start: 2024-11-13 | End: 2025-11-13

## 2024-11-13 RX ORDER — SERTRALINE HYDROCHLORIDE 100 MG/1
100 TABLET, FILM COATED ORAL NIGHTLY
Qty: 90 TABLET | Refills: 0 | Status: SHIPPED | OUTPATIENT
Start: 2024-11-13 | End: 2025-02-11

## 2024-11-13 NOTE — PROGRESS NOTES
Outpatient Psychiatry Follow Up Visit (MD/NP)  11/13/2024  The patient location is: [address] [place] [address]  The chief complaint leading to consultation is: anxiety     Visit type: audiovisual    Face to Face time with patient: 30 mins  35 minutes of total time spent on the encounter, which includes face to face time and non-face to face time preparing to see the patient (eg, review of tests), Obtaining and/or reviewing separately obtained history, Documenting clinical information in the electronic or other health record, Independently interpreting results (not separately reported) and communicating results to the patient/family/caregiver, or Care coordination (not separately reported).     Each patient to whom he or she provides medical services by telemedicine is:  (1) informed of the relationship between the physician and patient and the respective role of any other health care provider with respect to management of the patient; and (2) notified that he or she may decline to receive medical services by telemedicine and may withdraw from such care at any time.    Gaby Omar Álvarez, a 54 y.o. female, presenting for follow up visit. Met with patient alone     Reason for Encounter: self-referral. Patient complains of anxiety, depression.     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     Pt reports this medical stressor has occurred along with problems with her 24 yo daughter (not getting along with her father), her son graduating from high school.   She has taught x 23 years and she reports an incident occurred at school in the month of May which has caused her significant distress. She realized that during [employer] testing, there was something written on the wall that needed to be flipped over so the student would not  have the information during the testing.   Pt did flip the info over within a few minutes, but was very bothered that she had done this so she admitted her wrong doing to her principal.  Pt reports she ended up being written up - the first time this has ever happened to her which has been very distressing for her.  Additionally, she reports there was a meeting at school with the student and other team members and she was so concerned they would talk about her, that she decided to record the conversation on her phone.  She realized later that nothing actually did record on her phone, but she has lived in intense fear that she may lose her job.  She also feels her coworkers are treating her differently, and she is upset that no one has asked her how she is doing.   She denies hx of davina.   She denies hx of auditory/visual hallucinations. Pt is paranoid that coworkers are plotting against her and talking about her. She does feel friends and coworkers are talking about her and are plotting together.    Past Psychiatric History:  Prior diagnosis:  none  Inpatient psychiatric tx: none   Outpatient psychiatric tx:  None     Prior medications:   2005 prescribed something x 2 days - Cymbalta or Wellbutrin - sick to stomach, did not take it PCP  Lexapro, Clonazepam   Left work due to anxiety in 2010 - mother ill at time   Since under my care:  Abilify - weaned off, Sertraline   Prior suicide attempts: none  Prior hx self harm: none   Prior psychotherapy: none  Prior psychological testing:  None     Past medical history:  Superior canal dehiscence   Atopic dermatitis       INTERIM HISTORY:  Pt presents for follow up.   Med issues: SCD and atopic dermatitis.   Working busily with art sales  All great   1.5 week ago - she experienced more vertigo.  Resumed vestibular exercise herself three times a day.  Prior to that, had episode in May/June. Will reach out to Vestibular therapist if fails to resolve - it takes 6-8 weeks  "usually to resolve.  Needed a lot of family support.   Stress is a trigger, also watching sodium. Had a recent art show.   She took a Xanax only with flight - fine with flight.  Periodically now taking one-half tablet of Xanax - takes edge off, can see it her eyes, shoulders are tense, calm it down.   It does seem to help - 3-4 times a week 0.125 mg.   Dupixent - some itching upper forearm, no issues with fingers.  She thinks may be related to betahistine. No rash  Sertraline 100 mg x 2 days   Physical tension, no racing thoughts, but a lot on her mind, worry wart - pool, holidays, worry wart, pressure on self  Hard to relax mind and body  Vertigo triggers her anxiety - split second panic when it hits.  Worse when she looks down.   No major irritability, no sadness or anhedonia   Just put a pool in yard - loves it     She is under care of Dr. Bowden.  Last seen October 25th; Will have another CT scan. 12/2/2024. Maintaining on med.   Considering surgery for SCD  Sleeping propped up on couch - to assist with vertigo.  Vertigo is more pronounced in am.   Watching sodium.     She tried Buspirone x 1 week, really brought her down, crying, it was rough.     has hunting firearms "this is not even a thought in my brain."     Denies suicidal/homicidal ideations.    THE [facility] PROTOCOL SUICIDE SCREEN  I[]I Patient unable or unwilling to participate.    Always ask questions 1 and 2:   I[]I Y - Low Risk  I[x]I N  1) Wish To Be Dead: In the past month, have you wished you were dead or wished you could go to sleep and not wake up?: **   I[]I Y - Low Risk  I[x]I N  2) Non-Specific Active Suicidal Thoughts: In the past month, have you actually had any thoughts about killing yourself?: **    If YES to 2, ask questions 3, 4, 5, 6 and 7:  If NO to 2, skip to question 7:    I[]I Y - Moderate Risk  I[]I N  I[]I N/A  3) Active Suicidal Ideation with Any Methods (Not Plan) without Intent to Act: In the past month, have you been " thinking about how you might do this?        I[]I Y - High Risk          I[]I N  I[]I N/A  4) Active Suicidal Ideation with Some Intent to Act, without Specific Plan: In the past month, have you had these thoughts and had some intention of acting on them?: **  I[]I Y - High Risk          I[]I N  I[]I N/A  5) Active Suicidal Ideation with Specific Plan: In the past month, have you started to work out or already worked out the details of how to kill yourself?: **  I[]I Y - High Risk          I[]I N  I[]I N/A  6) Active Suicidal Ideation with Specific Plan and Intent: DO YOU INTEND TO CARRY OUT THIS PLAN?: **    Always Ask Question 7:   Suicidal Behavior: Have you done anything, started to do anything, or prepared to do anything to end your life...  I[]I Y - Moderate Risk  I[x]I N  Ever in your lifetime?: **  I[]I Y - High Risk          I[x]I N  In the past 3 months?: **    Examples: Collected pills, obtained a gun, gave away valuables, wrote a will or suicide note, took out pills but didn't swallow any, held a gun but changed mind or it was grabbed from hand, went to the roof but didn't jump; or actually took pills, tried to shoot self, cut self, tried to hang self, etc.      NOTE: The use of standardized rating scales and safety contracts can aid in risk assessment, but do not substitute for clinical judgment. My clinical assessment is in agreement with the [place] Protocol Suicide Screen.    No self harm or violence.   Rare alcohol -infrequent wine   No caffeine, no soft drinks   Denies substance use.     Medications:   Sertraline 100 mg every other day    Xanax 0.25 mg daily prn anxiety   Review Of Systems:     GENERAL:  weight stable   CARDIOVASCULAR:  No chest pain or recent palpitations   MUSCULOSKELETAL:  No pain or stiffness of the joints  DERM: atopic dermatitis  Neuro: SCD/vertigo   Current Evaluation:     Nutritional Screening: Considering the patient's height and weight, medications, medical history and  "preferences, should a referral be made to the dietitian? no    Constitutional  Vitals:  Most recent vital signs, dated more than 90 days prior to this appointment, were reviewed (not available)     General:  age appropriate, normal weight, well nourished, well dressed, neatly groomed, good eye contact      Musculoskeletal  Muscle Strength/Tone:  No tremor observed    Gait & Station:  Not able to assess virtually     Psychiatric  Speech:  no latency; no press   Mood & Affect:  "Good"  Full    Thought Process:  Linear with questions    Associations:  No RAMAKRISHNA    Thought Content:  no suicidality, no homicidality, hallucinations: (auditory: no, visual: no), no paranoid ideations    Insight:  intact   Judgement: Intact    Orientation:  grossly intact, person, place, situation, time/date, day of week, month of year, year   Memory: intact for content of interview, able to remember recent events- no, able to remember remote events- yes   Language: grossly intact   Attention Span & Concentration:  able to focus   Fund of Knowledge:  intact and appropriate to age and level of education, familiar with aspects of current personal life       Functioning in Relationships:  Spouse/partner: supportive  Peers: limited   Employers:  Retired     Medications  Outpatient Encounter Medications as of 11/13/2024   Medication Sig Dispense Refill    ALPRAZolam (XANAX) 0.25 MG tablet Take one to two tablets po daily prn anxiety 30 tablet 0    ascorbic acid, vitamin C, (VITAMIN C) 500 MG tablet Take 500 mg by mouth once daily.      betahistine HCl (BETAHISTINE, BULK, MISC) by Misc.(Non-Drug; Combo Route) route 2 (two) times a day.      cyanocobalamin (VITAMIN B-12) 1000 MCG tablet Take 2,500 mcg by mouth once daily.      DUPIXENT  mg/2 mL PnIj Inject 300 mg into the skin every 14 (fourteen) days.      MAGNESIUM GLYCINATE ORAL Take by mouth every evening. (Patient not taking: Reported on 8/19/2024)      sertraline (ZOLOFT) 100 MG tablet " Take 1 tablet (100 mg total) by mouth once daily. 90 tablet 0    vitamin D (VITAMIN D3) 1000 units Tab Take 5,000 Units by mouth once daily.       No facility-administered encounter medications on file as of 11/13/2024.         Assessment - Diagnosis - Goals:     Impression: pt presents for intake following high anxiety/stress related to health and occupational stressors. Pt's symptoms are bordering on delusional.  Family has been very concerned and her sister in law reached out to schedule this appt for her.  Pt with limited progress with addition of Lexapro. Addition of Abilify last visits showing small benefit.  Now with titration of Lexapro and taking Abilify several months and taking medical leave of absence, the patient is improved. + wt gain on Abilify - request to wean off.  No significant decompensation off of Abilify.  Pt has returned to work, reports she is doing fairly well despite stressors of job. Work performance is good.  Plans to retire in 2021, at her 28 yr donald. Requested to reduce lexapro in case it is causing skin symptoms, med changed to Sertraline. Has seen derm - dx with psoriasis,stress related.   Anxiety is increased related to school stressors.  Pt has now retired, reports she is much improved and has started to wean off of Sertraline.  Pt reports her sister in law passed away which triggered an increase in anxiety and SCD/vertigo symptoms.  Multiple stressors which are contributing to her feeling overwhelmed - all have improved.  She tapered off of Sertraline; had flare of vertigo in interim with anxiety. Improved with resumption of Sertraline.     MDD, single episode,in full remission  Generalized Anxiety Disorder  Superior Canal Dehiscence, Psoriasis      GAF: 63   Strengths and Liabilities: Strength: Patient accepts guidance/feedback, Strength: Patient is expressive/articulate., Strength: Patient is intelligent.    Treatment Goals:  Specify outcomes written in observable, behavioral  terms:   Anxiety: acquiring relapse prevention skills, reducing negative automatic thoughts, reducing physical symptoms of anxiety and reducing time spent worrying (<30 minutes/day)  Depression: acquiring relapse prevention skills, increasing energy, increasing interest in usual activities and increasing motivation    Treatment Plan/Recommendations:   Medication Management: The risks and benefits of medication were discussed with the patient.    - Increase Sertraline to 125 mg daily   - Pt requested refill of Xanax for flight; will fill 0.25 mg (one-half to one tab) daily p.r.n. anxiety.  Discussed risk of decreased RT, sedation, addictive potential, and not to mix with alcohol.  [place] prescription monitoring program reviewed.  - Pt instructed to go to ER with thoughts of harming self, others   - Call to report any worsening of symptoms or problems with the medication  - Psychotherapy has been recommended; she has declined   - Discussed nonpharmacologic interventions for anxiety including regular exercise, healthy diet, practice of mindfulness; continue Yoga. Art has been therapeutic.   - follow up with Dermatology, PCP, Neurology     -Spent 30 min face to face with the pt; >50% time spent in counseling   -Supportive therapy and psychoeducation provided  -R/B/SE's of medications discussed with the pt who expresses understanding and chooses to take medications as prescribed.   -Pt instructed to call clinic, 911 or go to nearest emergency room if sxs worsen or pt is in   crisis. The pt expresses understanding.      Return to Clinic:  2.5 months

## 2024-11-20 ENCOUNTER — TELEPHONE (OUTPATIENT)
Dept: PSYCHIATRY | Facility: CLINIC | Age: 54
End: 2024-11-20
Payer: COMMERCIAL

## 2024-11-20 NOTE — TELEPHONE ENCOUNTER
----- Message from Stephanie Mcpherson MD sent at 11/19/2024 10:36 PM CST -----  2.5 months please - will be due in person.

## 2025-02-07 ENCOUNTER — OFFICE VISIT (OUTPATIENT)
Dept: PSYCHIATRY | Facility: CLINIC | Age: 55
End: 2025-02-07
Payer: COMMERCIAL

## 2025-02-07 VITALS
DIASTOLIC BLOOD PRESSURE: 80 MMHG | BODY MASS INDEX: 22.18 KG/M2 | HEART RATE: 73 BPM | SYSTOLIC BLOOD PRESSURE: 117 MMHG | WEIGHT: 129.94 LBS | HEIGHT: 64 IN

## 2025-02-07 DIAGNOSIS — F41.1 GAD (GENERALIZED ANXIETY DISORDER): ICD-10-CM

## 2025-02-07 DIAGNOSIS — F33.42 MDD (MAJOR DEPRESSIVE DISORDER), RECURRENT, IN FULL REMISSION: ICD-10-CM

## 2025-02-07 PROBLEM — H83.8X1 SUPERIOR SEMICIRCULAR CANAL DEHISCENCE OF RIGHT EAR: Status: ACTIVE | Noted: 2022-06-29

## 2025-02-07 PROBLEM — H90.3 HEARING LOSS, SENSORINEURAL, ASYMMETRICAL: Status: ACTIVE | Noted: 2022-10-22

## 2025-02-07 PROBLEM — H81.93 VESTIBULOPATHY OF BOTH EARS: Status: ACTIVE | Noted: 2022-07-06

## 2025-02-07 PROBLEM — H90.0 CONDUCTIVE HEARING LOSS, BILATERAL: Status: ACTIVE | Noted: 2022-11-04

## 2025-02-07 PROCEDURE — 99214 OFFICE O/P EST MOD 30 MIN: CPT | Mod: S$GLB,,, | Performed by: PSYCHIATRY & NEUROLOGY

## 2025-02-07 PROCEDURE — 3074F SYST BP LT 130 MM HG: CPT | Mod: CPTII,S$GLB,, | Performed by: PSYCHIATRY & NEUROLOGY

## 2025-02-07 PROCEDURE — 99999 PR PBB SHADOW E&M-EST. PATIENT-LVL III: CPT | Mod: PBBFAC,,, | Performed by: PSYCHIATRY & NEUROLOGY

## 2025-02-07 PROCEDURE — 3008F BODY MASS INDEX DOCD: CPT | Mod: CPTII,S$GLB,, | Performed by: PSYCHIATRY & NEUROLOGY

## 2025-02-07 PROCEDURE — 3079F DIAST BP 80-89 MM HG: CPT | Mod: CPTII,S$GLB,, | Performed by: PSYCHIATRY & NEUROLOGY

## 2025-02-07 RX ORDER — ALPRAZOLAM 0.25 MG/1
TABLET ORAL
Qty: 30 TABLET | Refills: 0 | Status: SHIPPED | OUTPATIENT
Start: 2025-02-07

## 2025-02-07 RX ORDER — SERTRALINE HYDROCHLORIDE 100 MG/1
100 TABLET, FILM COATED ORAL NIGHTLY
Qty: 90 TABLET | Refills: 1 | Status: SHIPPED | OUTPATIENT
Start: 2025-02-07 | End: 2025-08-06

## 2025-02-07 NOTE — PROGRESS NOTES
Outpatient Psychiatry Follow Up Visit (MD/NP)  02/07/2025    Gaby Álvarez, a 54 y.o. female, presenting for follow up visit. Met with patient alone     Reason for Encounter: self-referral. Patient complains of anxiety, depression.     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     Pt reports this medical stressor has occurred along with problems with her 22 yo daughter (not getting along with her father), her son graduating from high school.   She has taught x 23 years and she reports an incident occurred at school in the month of May which has caused her significant distress. She realized that during [employer] testing, there was something written on the wall that needed to be flipped over so the student would not have the information during the testing.   Pt did flip the info over within a few minutes, but was very bothered that she had done this so she admitted her wrong doing to her principal.  Pt reports she ended up being written up - the first time this has ever happened to her which has been very distressing for her.  Additionally, she reports there was a meeting at school with the student and other team members and she was so concerned they would talk about her, that she decided to record the conversation on her phone.  She realized later that nothing actually did record on her phone, but she has lived in intense fear that she may lose her job.  She also feels her coworkers are treating her differently, and she is upset that no one has asked her how she is doing.   She denies hx of davina.   She denies hx of auditory/visual hallucinations. Pt is paranoid that coworkers are plotting against her and talking about her. She does feel friends and coworkers are talking about her and are plotting together.    Past Psychiatric  "History:  Prior diagnosis:  none  Inpatient psychiatric tx: none   Outpatient psychiatric tx:  None     Prior medications:   2005 prescribed something x 2 days - Cymbalta or Wellbutrin - sick to stomach, did not take it PCP  Lexapro, Clonazepam   Left work due to anxiety in 2010 - mother ill at time   Since under my care:  Abilify - weaned off, Sertraline   Prior suicide attempts: none  Prior hx self harm: none   Prior psychotherapy: none  Prior psychological testing:  None     Past medical history:  Superior canal dehiscence, positional vertigo   Atopic dermatitis       INTERIM HISTORY: "Busy, up and down"   Pt presents for follow up.  Overall, she is doing well. Her father in law has health issues - this has been difficult.  He is currently in inpatient rehab following spine surgery.   Only briefly took Sertraline 25 mg dosing, a few weeks - she returned to 100 mg daily which she feels helps and is her best dosing.   Med issues: SCD, vertigo, and atopic dermatitis.   Art is keeping her so busy.   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  Traveled for daughter's birthday to [place].  Rare use of Xanax for air travel primarily.   2 issues with vertigo in interim; had repeat CT scan, no progression with right sided dehiscence.  Left sided issue with canaliths - improved now.   Now watching sodium no alcohol or caffeine, chocolate.  Had prior vertigo episode over summer triggered by sodium. Has also worked with vestibular therapist.   Wt Readings from Last 3 Encounters:   02/07/25 1005 59 kg (129 lb 15.4 oz)   11/08/23 1014 72.7 kg (160 lb 6.2 oz)   04/27/23 1240 65.8 kg (145 lb)   Lost 30 lbs, anticipate wedding, dietary adjustments.  Eating protein bars, put pool in yard.    Back on Sertraline.  No rash or hives, has had some itching of bilateral upper arms which she relates to betahistine (worse when she has had to increase dose); it has subsided some.    Spacing dupixent to taper off.     She " is under care of Dr. Bowden.    She tried Buspirone x 1 week, really brought her down, crying, it was rough.        Denies suicidal/homicidal ideations.  Denies symptoms of davina/psychosis.   No self harm or violence.   Does not drink alcohol.     THE [place] PROTOCOL SUICIDE SCREEN  I[]I Patient unable or unwilling to participate.   has hunting rifles.    Always ask questions 1 and 2:   I[]I Y - Low Risk  I[x]I N  1) Wish To Be Dead: In the past month, have you wished you were dead or wished you could go to sleep and not wake up?: **   I[]I Y - Low Risk  I[x]I N  2) Non-Specific Active Suicidal Thoughts: In the past month, have you actually had any thoughts about killing yourself?: **    If YES to 2, ask questions 3, 4, 5, 6 and 7:  If NO to 2, skip to question 7:    I[]I Y - Moderate Risk  I[]I N  I[]I N/A  3) Active Suicidal Ideation with Any Methods (Not Plan) without Intent to Act: In the past month, have you been thinking about how you might do this?        I[]I Y - High Risk          I[]I N  I[]I N/A  4) Active Suicidal Ideation with Some Intent to Act, without Specific Plan: In the past month, have you had these thoughts and had some intention of acting on them?: **  I[]I Y - High Risk          I[]I N  I[]I N/A  5) Active Suicidal Ideation with Specific Plan: In the past month, have you started to work out or already worked out the details of how to kill yourself?: **  I[]I Y - High Risk          I[]I N  I[]I N/A  6) Active Suicidal Ideation with Specific Plan and Intent: DO YOU INTEND TO CARRY OUT THIS PLAN?: **    Always Ask Question 7:   Suicidal Behavior: Have you done anything, started to do anything, or prepared to do anything to end your life...  I[]I Y - Moderate Risk  I[x]I N  Ever in your lifetime?: **  I[]I Y - High Risk          I[x]I N  In the past 3 months?: **    Examples: Collected pills, obtained a gun, gave away valuables, wrote a will or suicide note, took out pills but didn't  swallow any, held a gun but changed mind or it was grabbed from hand, went to the roof but didn't jump; or actually took pills, tried to shoot self, cut self, tried to hang self, etc.      NOTE: The use of standardized rating scales and safety contracts can aid in risk assessment, but do not substitute for clinical judgment. My clinical assessment is in agreement with the [place] Protocol Suicide Screen.        2/7/2025   PHQ-9 Depression Patient Health Questionnaire   Over the last two weeks how often have you been bothered by little interest or pleasure in doing things 0    Over the last two weeks how often have you been bothered by feeling down, depressed or hopeless 0    Over the last two weeks how often have you been bothered by trouble falling or staying asleep, or sleeping too much 0    Over the last two weeks how often have you been bothered by feeling tired or having little energy 1    Over the last two weeks how often have you been bothered by a poor appetite or overeating 0    Over the last two weeks how often have you been bothered by feeling bad about yourself - or that you are a failure or have let yourself or your family down 0    Over the last two weeks how often have you been bothered by trouble concentrating on things, such as reading the newspaper or watching television 0    Over the last two weeks how often have you been bothered by moving or speaking so slowly that other people could have noticed. 0    Over the last two weeks how often have you been bothered by thoughts that you would be better off dead, or of hurting yourself 0    PHQ-9 Score 1        Patient-reported          1/30/2023     8:45 PM 11/8/2023     7:18 AM 2/6/2025     6:52 PM   IVONNE-7   1. Feeling nervous, anxious, or on edge? Not at all  Not at all  Not at all    2. Not being able to stop or control worrying? Not at all  Not at all  Not at all    3. Worrying too much about different things? Several days  Not at all  Several days  "   4. Trouble relaxing? Not at all  Not at all  Not at all    5. Being so restless that it is hard to sit still? Not at all  Not at all  Not at all    6. Becoming easily annoyed or irritable? Several days  Not at all  Not at all    7. Feeling afraid as if something awful might happen? Not at all  Not at all  Not at all    8. If you checked off any problems, how difficult have these problems made it for you to do your work, take care of things at home, or get along with other people? Not difficult at all  Not difficult at all  Not difficult at all    IVONNE-7 Score 2 0 1    Number answered (out of first 7) 7 7 7    Interpretation Normal Normal Normal        Patient-reported         No self harm or violence.   Rare alcohol -infrequent wine   No caffeine, no soft drinks   Denies substance use.     Medications:   Sertraline 100 mg every other day    Xanax 0.25 mg daily prn anxiety - rare   Review Of Systems:     GENERAL:  weight loss   CARDIOVASCULAR:  No chest pain or recent palpitations   MUSCULOSKELETAL:  No pain or stiffness of the joints  DERM: atopic dermatitis  Neuro: SCD/vertigo   Current Evaluation:     Nutritional Screening: Considering the patient's height and weight, medications, medical history and preferences, should a referral be made to the dietitian? no    Constitutional  Vitals:  Most recent vital signs, dated more than 90 days prior to this appointment, were reviewed (not available)     General:  age appropriate, normal weight, well nourished, well dressed, neatly groomed, good eye contact      Musculoskeletal  Muscle Strength/Tone:  No tremor observed    Gait & Station:  Steady      Psychiatric  Speech:  no latency; no press   Mood & Affect:  "Good"  Full    Thought Process:  Linear with questions    Associations:  No RAMAKRISHNA    Thought Content:  no suicidality, no homicidality, hallucinations: (auditory: no, visual: no), no paranoid ideations    Insight:  intact   Judgement: Intact    Orientation:  grossly " intact, person, place, situation, time/date, day of week, month of year, year   Memory: intact for content of interview, able to remember recent events- no, able to remember remote events- yes   Language: grossly intact   Attention Span & Concentration:  able to focus   Fund of Knowledge:  intact and appropriate to age and level of education, familiar with aspects of current personal life       Functioning in Relationships:  Spouse/partner: supportive  Peers: limited   Employers:  Retired, now has art business      Medications  Outpatient Encounter Medications as of 2/7/2025   Medication Sig Dispense Refill    ALPRAZolam (XANAX) 0.25 MG tablet Take one-half to one tablet po daily prn anxiety 30 tablet 1    ascorbic acid, vitamin C, (VITAMIN C) 500 MG tablet Take 500 mg by mouth once daily.      betahistine HCl (BETAHISTINE, BULK, MISC) by Misc.(Non-Drug; Combo Route) route 2 (two) times a day.      cyanocobalamin (VITAMIN B-12) 1000 MCG tablet Take 2,500 mcg by mouth once daily.      DUPIXENT  mg/2 mL PnIj Inject 300 mg into the skin every 14 (fourteen) days.      multivit-mins no.63/iron/folic (M-VIT ORAL) Take by mouth once daily. Over 49 yo vitamin      sertraline (ZOLOFT) 100 MG tablet Take 1 tablet (100 mg total) by mouth every evening. 90 tablet 0    sertraline (ZOLOFT) 25 MG tablet Take 1 tablet (25 mg total) by mouth every evening. 90 tablet 0    vitamin D (VITAMIN D3) 1000 units Tab Take 5,000 Units by mouth once daily.       No facility-administered encounter medications on file as of 2/7/2025.         Assessment - Diagnosis - Goals:     Impression: pt presents for intake following high anxiety/stress related to health and occupational stressors. Pt's symptoms are bordering on delusional.  Family has been very concerned and her sister in law reached out to schedule this appt for her.  Pt with limited progress with addition of Lexapro. Addition of Abilify last visits showing small benefit.  Now with  titration of Lexapro and taking Abilify several months and taking medical leave of absence, the patient is improved. + wt gain on Abilify - request to wean off.  No significant decompensation off of Abilify.  Pt has returned to work, reports she is doing fairly well despite stressors of job. Work performance is good.  Plans to retire in 2021, at her 28 yr donald. Requested to reduce lexapro in case it is causing skin symptoms, med changed to Sertraline. Has seen derm - dx with psoriasis,stress related.   Anxiety is increased related to school stressors.  Pt has now retired, reports she is much improved and has started to wean off of Sertraline.  Pt reports her sister in law passed away which triggered an increase in anxiety and SCD/vertigo symptoms.  Multiple stressors which are contributing to her feeling overwhelmed - all have improved.  She tapered off of Sertraline; had flare of vertigo in interim with anxiety. Improved with resumption of Sertraline.      MDD, single episode,in full remission  Generalized Anxiety Disorder  Superior Canal Dehiscence, Psoriasis      GAF: 63   Strengths and Liabilities: Strength: Patient accepts guidance/feedback, Strength: Patient is expressive/articulate., Strength: Patient is intelligent.    Treatment Goals:  Specify outcomes written in observable, behavioral terms:   Anxiety: acquiring relapse prevention skills, reducing negative automatic thoughts, reducing physical symptoms of anxiety and reducing time spent worrying (<30 minutes/day)  Depression: acquiring relapse prevention skills, increasing energy, increasing interest in usual activities and increasing motivation    Treatment Plan/Recommendations:   Medication Management: The risks and benefits of medication were discussed with the patient.    - Continue Sertraline 100 mg daily   - Rare use of Xanax 0.25 mg (one-half to one tab) daily p.r.n. anxiety - primarily for air travel.  Discussed risk of decreased RT, sedation,  addictive potential, and not to mix with alcohol.  [place] prescription monitoring program reviewed.  - Pt instructed to go to ER with thoughts of harming self, others   - Call to report any worsening of symptoms or problems with the medication  - Psychotherapy has been recommended; she has declined   - Discussed nonpharmacologic interventions for anxiety including regular exercise, healthy diet, practice of mindfulness; continue Yoga. Art has been therapeutic.   - follow up with Dermatology, PCP, Neurology; she feels intermittent pruritus has been associated with betahistine and will discuss with provider.      -Spent 30 min face to face with the pt; >50% time spent in counseling   -Supportive therapy and psychoeducation provided  -R/B/SE's of medications discussed with the pt who expresses understanding and chooses to take medications as prescribed.   -Pt instructed to call clinic, 911 or go to nearest emergency room if sxs worsen or pt is in   crisis. The pt expresses understanding.      Return to Clinic:  6 months - pt will self schedule; virtual ok.

## 2025-02-28 ENCOUNTER — TELEPHONE (OUTPATIENT)
Dept: PSYCHIATRY | Facility: CLINIC | Age: 55
End: 2025-02-28
Payer: COMMERCIAL

## 2025-02-28 NOTE — TELEPHONE ENCOUNTER
I reached out to the pt as her sister in law contacted me indirectly about a concern.   Pt states there was a situation with her niece that made her very anxious and it is not quite resolved.  She did not go into details, but like the situation with school in the past, she is worried she did something wrong.  Her niece just had a baby on Feb 3rd and she has been helping with her 3 yo daughter.   Her anxiety completely got control of her  She feels she has regained awareness  Pt is taking Sertraline and Prn Xanax - not daily.   Sleeping ok, some night's waking up at 4 am with racing thoughts, she has also had some panic attacks.   Appetite is problematic - Struggling with watching her sodium intake - she worries about triggering vertigo obsessively.   Watches what she is eating - she is lost weight  Wt Readings from Last 3 Encounters:   02/07/25 1005 59 kg (129 lb 15.4 oz)   11/08/23 1014 72.7 kg (160 lb 6.2 oz)   04/27/23 1240 65.8 kg (145 lb)     She has lost 30 lbs - when son got  in August 2024 she started cutting back to lose weight.  Weighed 121 lbs yesterday at home. The other day she was 118 and this scared her. Not eating full meals.  She reads every label, can feel it in her ears, a fullness after she consumes sodium.   She had lab work in January with an external provider - we discussed risks of hyponatremia.  No concerns were reported with labs, she will message me a copy of labs.   With recent family concern, she had a feeling of going away, passive SI without any plan which also was ego dystonic and scared the shit out of her.   Her  does have a firearm at home - we discussed having this secured asap - she states she would discuss with her .  She has not had this thought since 4 days ago.   Son is very worried about her - he came to stay with her but she told him to go to a parade tonight ( is in [place]).  Family was concerned that she is alone, so her daughter-in-law's mother  was sent to home during this phone call and she asked to hang up with me, assuring me she is safe and denies SI.   Pt states she is scared to even take a whole tab of Xanax and she knows she would not do anything to harm herself.   She did accept to meet virtually on Wednesday, 3/5 at 1:00 pm.  Will forward to staff for scheduling.

## 2025-03-05 ENCOUNTER — OFFICE VISIT (OUTPATIENT)
Dept: PSYCHIATRY | Facility: CLINIC | Age: 55
End: 2025-03-05
Payer: COMMERCIAL

## 2025-03-05 ENCOUNTER — TELEPHONE (OUTPATIENT)
Dept: OBSTETRICS AND GYNECOLOGY | Facility: CLINIC | Age: 55
End: 2025-03-05
Payer: COMMERCIAL

## 2025-03-05 DIAGNOSIS — F32.A DEPRESSION, UNSPECIFIED DEPRESSION TYPE: ICD-10-CM

## 2025-03-05 DIAGNOSIS — F22 DELUSIONAL DISORDER: ICD-10-CM

## 2025-03-05 DIAGNOSIS — F41.1 GAD (GENERALIZED ANXIETY DISORDER): ICD-10-CM

## 2025-03-05 PROCEDURE — G2211 COMPLEX E/M VISIT ADD ON: HCPCS | Mod: 95,,, | Performed by: PSYCHIATRY & NEUROLOGY

## 2025-03-05 PROCEDURE — 1160F RVW MEDS BY RX/DR IN RCRD: CPT | Mod: CPTII,95,, | Performed by: PSYCHIATRY & NEUROLOGY

## 2025-03-05 PROCEDURE — 1159F MED LIST DOCD IN RCRD: CPT | Mod: CPTII,95,, | Performed by: PSYCHIATRY & NEUROLOGY

## 2025-03-05 PROCEDURE — 98007 SYNCH AUDIO-VIDEO EST HI 40: CPT | Mod: 95,,, | Performed by: PSYCHIATRY & NEUROLOGY

## 2025-03-05 RX ORDER — ARIPIPRAZOLE 2 MG/1
2 TABLET ORAL DAILY
Qty: 15 TABLET | Refills: 0 | Status: SHIPPED | OUTPATIENT
Start: 2025-03-05 | End: 2025-03-12

## 2025-03-05 NOTE — PROGRESS NOTES
Outpatient Psychiatry Follow Up Visit (MD/NP)  03/05/2025    The patient location is: [facility] in [place], [place] -> [facility]   The chief complaint leading to consultation is: delusional disorder, depression, anxiety, OCD    Visit type: audiovisual    Face to Face time with patient: 58 mins  60 minutes of total time spent on the encounter, which includes face to face time and non-face to face time preparing to see the patient (eg, review of tests), Obtaining and/or reviewing separately obtained history, Documenting clinical information in the electronic or other health record, Independently interpreting results (not separately reported) and communicating results to the patient/family/caregiver, or Care coordination (not separately reported).     Each patient to whom he or she provides medical services by telemedicine is:  (1) informed of the relationship between the physician and patient and the respective role of any other health care provider with respect to management of the patient; and (2) notified that he or she may decline to receive medical services by telemedicine and may withdraw from such care at any time.    Gaby Omar Álvarez, a 54 y.o. female, presenting for follow up visit. Met with patient and her .     Reason for Encounter: self-referral. Patient complains of anxiety, depression.     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     Pt reports this medical stressor has occurred along with problems with her 22 yo daughter (not getting along with her father), her son graduating from high school.   She has taught x 23 years and she reports an incident occurred at school in the month of May which has caused her significant distress. She realized that during [employer] testing, there was something written on the  wall that needed to be flipped over so the student would not have the information during the testing.   Pt did flip the info over within a few minutes, but was very bothered that she had done this so she admitted her wrong doing to her principal.  Pt reports she ended up being written up - the first time this has ever happened to her which has been very distressing for her.  Additionally, she reports there was a meeting at school with the student and other team members and she was so concerned they would talk about her, that she decided to record the conversation on her phone.  She realized later that nothing actually did record on her phone, but she has lived in intense fear that she may lose her job.  She also feels her coworkers are treating her differently, and she is upset that no one has asked her how she is doing.   She denies hx of davina.   She denies hx of auditory/visual hallucinations. Pt is paranoid that coworkers are plotting against her and talking about her. She does feel friends and coworkers are talking about her and are plotting together.    Past Psychiatric History:  Prior diagnosis:  none  Inpatient psychiatric tx: none   Outpatient psychiatric tx:  None     Prior medications:   2005 prescribed something x 2 days - Cymbalta or Wellbutrin - sick to stomach, did not take it PCP  Lexapro, Clonazepam  She tried Buspirone x 1 week, really brought her down, crying, it was rough.      Left work due to anxiety in 2010 - mother ill at time   Since under my care:  Abilify - weaned off, Sertraline   Prior suicide attempts: none  Prior hx self harm: none   Prior psychotherapy: none  Prior psychological testing:  None     Past medical history:  Superior canal dehiscence, positional vertigo   Atopic dermatitis       INTERIM HISTORY:   Pt presents for urgent follow up.    She is taking Sertraline 100 mg daily.    Med issues: SCD, vertigo, and atopic dermatitis.     Telephone call 2/28/2025:  I reached out to  the pt as her sister in law contacted me indirectly about a concern.   Pt states there was a situation with her niece that made her very anxious and it is not quite resolved.  She did not go into details, but like the situation with school in the past, she is worried she did something wrong.  Her niece just had a baby on Feb 3rd and she has been helping with her 3 yo daughter.   Her anxiety completely got control of her  She feels she has regained awareness  Pt is taking Sertraline and Prn Xanax - not daily.   Sleeping ok, some night's waking up at 4 am with racing thoughts, she has also had some panic attacks.   Appetite is problematic - Struggling with watching her sodium intake - she worries about triggering vertigo obsessively.   Watches what she is eating - she is lost weight       Wt Readings from Last 3 Encounters:   02/07/25 1005 59 kg (129 lb 15.4 oz)   11/08/23 1014 72.7 kg (160 lb 6.2 oz)   04/27/23 1240 65.8 kg (145 lb)      She has lost 30 lbs - when son got  in August 2024 she started cutting back to lose weight.  Weighed 121 lbs yesterday at home. The other day she was 118 and this scared her. Not eating full meals.  She reads every label, can feel it in her ears, a fullness after she consumes sodium.   She had lab work in January with an external provider - we discussed risks of hyponatremia.  No concerns were reported with labs, she will message me a copy of labs.   With recent family concern, she had a feeling of going away, passive SI without any plan which also was ego dystonic and scared the shit out of her.   Her  does have a firearm at home - we discussed having this secured asap - she states she would discuss with her .  She has not had this thought since 4 days ago.   Son is very worried about her - he came to stay with her but she told him to go to a parade tonight ( is in [place]).  Family was concerned that she is alone, so her daughter-in-law's mother was sent to  "home during this phone call and she asked to hang up with me, assuring me she is safe and denies SI.   Pt states she is scared to even take a whole tab of Xanax and she knows she would not do anything to harm herself.   She did accept to meet virtually on Wednesday, 3/5 at 1:00 pm.  Will forward to staff for scheduling.     Typed out a note and was done  Felt like it again on Monday    The pt has been seriously struggling in the past 2 weeks.   On Feb 3rd, her niece had a baby.   She had been watching her niece's 1 yo - the sister in law passed away, this is her daughter  It was her yenni knowing she could help.   The next day, she went to visit the baby, that afternoon, things took a turn for worse.  Her niece called her and told her the baby may have herpes.  The pt became consumed with the idea that she had herpes and could have given it to the baby.  This caused her significant distress and functional impairment and she was not able to reality test.   (The baby tested negative).   Then, she went to visit the baby once d/c - she visited, felt nervous, said nothing.   She became consumed with concern that the baby was not ok.   She did not go over - did not want to get involved   Then, she started receiving ? Inappropriate messages from her niece with what she felt were sexual undertones.  Pt started experiencing more paranoia.   Recalled the 1 yo had a rash on her bottom, "Did I have something to do with that?"   She started questioning everything - could have been from me.    kept saying drop it - then her anxiety started building - "did I touch the baby's hand, did I give him a germ?"   Her niece is a PA - "you could have it and not have symptoms;"  she started going down rabbit holes.  Pt ordered a test online - niece started telling her not to worry   She wondered if her  cheated on her, she was researching, "could I get it in a monogamous relationship?"   Pt was such a nervous wreck - "can I hold " "the baby, is it ok?"   She ended up taking a test (negative) and had a misunderstanding with niece. "Do you know something I dont know?"    Son has a caring loving heart. - he was able to talk to her calmly and rationally.   Last week, the pt typed up a suicide note in her notes on phone and showed it to her . She showed it to her  because she recognized she needed help.    works for  and did not reach out for help (he had my number) because he did not want her committed.  She has mostly had family with her both she told her son to go to the parade and then they sent over his mother in law (when I was on the phone with her last week).   The pt had a plan to get a gun - her niece was not talking to her, she wondered if she needed an , would she go to group home, break apart her family.   was yelling at her. "If I messed up, I cannot do this anymore."      She has always been a worry person. Not to this degree. Pt began to believe that the people are working in a group against her - two nieces are plotting against her.   She did not feel it was a plot - something felt off about everything.  Monday was very hard - she wanted to lose it again.   She took her daughter to the [employer].  That night, comments were made in the family.  We need a notary, selling property - she felt she needs to get an  for a suit against her because she went to the house.  Now niece will not speak to her (sister) - she feels she cut her off.  Told her she needed some space.   Still guarded with her niece (mother of child).     2 days ago, the pt got up and went into the safe and  found her holding his gun.   is not sure how she gained access. He removed the gun from her and has hidden it and has hidden the ammunition. He did not take the pt to the ED and they both refuse hospitalization.  He reports they are not leaving the pt alone at all.    She has also been afraid to eat for " fear she will consume too much sodium and make vestibular problem worse.  She has lost a lot of weight - low of 117 lbs.  The patient states since she got her own negative herpes test result, she has been cured.   She states she wants to be here for her grandchildren.    She is worried how anxiety got a hold of her.   She has nervous twitch - puffs her cheeks - she controls it.   Pt feels like she is moving forward -   Her niece's 3 yo had hand, foot, and mouth virus - now in her mind, she is thinking she could have had something to do with this .   Baseline is highly functioning.  She denies prior obsessions/compulsions - she had similar episode when teaching, she worried she did something wrong at school.   confirms 2 weeks ago, she was fine - no issues with mental health.    She is sleeping fairly well.  Some mornings she gets up early.  Worries all the time - better since Tuesday (test result negative).   Pt states she had lab work in January - all was negative except B12 and D were high and she reduced supplements - B12 from 5000 to 1000 mcg and D3 48306 iu to 5000 iu.   She is smiling today, states repeatedly, she would not hurt herself, feels like she has pulled through.     She is under care of Dr. Bowden.      Denies homicidal ideations.  Denies symptoms of davina.  Pt denies AH/VH.  + paranoid delusions, IOR.   No self harm or violence.   Does not drink alcohol.  She denies substance use.     THE [place] PROT[employer]OL SUICIDE SCREEN  I[]I Patient unable or unwilling to participate.   has firearms - has now secured and  ammunition.     Always ask questions 1 and 2:   I[x]I Y - Low Risk  I[]I N  1) Wish To Be Dead: In the past month, have you wished you were dead or wished you could go to sleep and not wake up?: **   I[x]I Y - Low Risk  I[]I N  2) Non-Specific Active Suicidal Thoughts: In the past month, have you actually had any thoughts about killing yourself?: **    If YES to 2, ask  questions 3, 4, 5, 6 and 7:  If NO to 2, skip to question 7:    I[]I Y - Moderate Risk  I[]I N  I[]I N/A  3) Active Suicidal Ideation with Any Methods (Not Plan) without Intent to Act: In the past month, have you been thinking about how you might do this?        I[]I Y - High Risk          I[]I N  I[]I N/A  4) Active Suicidal Ideation with Some Intent to Act, without Specific Plan: In the past month, have you had these thoughts and had some intention of acting on them?: **  I[]I Y - High Risk          I[]I N  I[]I N/A  5) Active Suicidal Ideation with Specific Plan: In the past month, have you started to work out or already worked out the details of how to kill yourself?: **  I[]I Y - High Risk          I[]I N  I[]I N/A  6) Active Suicidal Ideation with Specific Plan and Intent: DO YOU INTEND TO CARRY OUT THIS PLAN?: **    Always Ask Question 7:   Suicidal Behavior: Have you done anything, started to do anything, or prepared to do anything to end your life...  I[]I Y - Moderate Risk  I[x]I N  Ever in your lifetime?: **  I[]I Y - High Risk          I[x]I N  In the past 3 months?: **    Examples: Collected pills, obtained a gun, gave away valuables, wrote a will or suicide note, took out pills but didn't swallow any, held a gun but changed mind or it was grabbed from hand, went to the roof but didn't jump; or actually took pills, tried to shoot self, cut self, tried to hang self, etc.      NOTE: The use of standardized rating scales and safety contracts can aid in risk assessment, but do not substitute for clinical judgment. My clinical assessment is in agreement with the [place] Protocol Suicide Screen.    Medications:   Sertraline 100 mg every other day    Xanax 0.25 mg daily prn anxiety - rare   Review Of Systems:     GENERAL:  weight loss   CARDIOVASCULAR:  No chest pain or recent palpitations   MUSCULOSKELETAL:  No pain or stiffness of the joints  DERM: atopic dermatitis  Neuro: SCD/vertigo   Current Evaluation:  "    Nutritional Screening: Considering the patient's height and weight, medications, medical history and preferences, should a referral be made to the dietitian? no    Constitutional  Vitals:  Most recent vital signs, dated more than 90 days prior to this appointment, were reviewed (not available)     General:  age appropriate, normal weight, note wt loss, neatly groomed, good eye contact      Musculoskeletal  Muscle Strength/Tone:  No tremor observed    Gait & Station:  Seated      Psychiatric  Speech:  no latency; no press   Mood & Affect:  "I am ok today"  Full    Thought Process:  Linear with questions    Associations:  No RAMAKRISHNA    Thought Content:  no suicidality, no homicidality, hallucinations: (auditory: no, visual: no), + paranoid ideations    Insight:  intact   Judgement: Intact    Orientation:  grossly intact, person, place, situation, time/date, day of week, month of year, year   Memory: intact for content of interview, able to remember recent events- no, able to remember remote events- yes   Language: grossly intact   Attention Span & Concentration:  able to focus   Fund of Knowledge:  intact and appropriate to age and level of education, familiar with aspects of current personal life       Functioning in Relationships:  Spouse/partner: supportive  Peers: limited   Employers:  Retired, now has art business      Medications  Outpatient Encounter Medications as of 3/5/2025   Medication Sig Dispense Refill    ALPRAZolam (XANAX) 0.25 MG tablet Take one-half to one tablet po daily prn anxiety 30 tablet 0    ascorbic acid, vitamin C, (VITAMIN C) 500 MG tablet Take 500 mg by mouth once daily.      betahistine HCl (BETAHISTINE, BULK, MISC) by Misc.(Non-Drug; Combo Route) route 2 (two) times a day. Taking as needed - tapered down      cyanocobalamin (VITAMIN B-12) 1000 MCG tablet Take 2,500 mcg by mouth once daily.      DUPIXENT  mg/2 mL PnIj Inject 300 mg into the skin every 14 (fourteen) days.      " multivit-mins no.63/iron/folic (M-VIT ORAL) Take by mouth once daily. Over 51 yo vitamin      sertraline (ZOLOFT) 100 MG tablet Take 1 tablet (100 mg total) by mouth every evening. 90 tablet 1    vitamin D (VITAMIN D3) 1000 units Tab Take 5,000 Units by mouth once daily.       No facility-administered encounter medications on file as of 3/5/2025.         Assessment - Diagnosis - Goals:     Impression: pt presents for intake following high anxiety/stress related to health and occupational stressors. Pt's symptoms are bordering on delusional.  Family has been very concerned and her sister in law reached out to schedule this appt for her.  Pt with limited progress with addition of Lexapro. Addition of Abilify last visits showing small benefit.  Now with titration of Lexapro and taking Abilify several months and taking medical leave of absence, the patient is improved. + wt gain on Abilify - request to wean off.  No significant decompensation off of Abilify.  Pt has returned to work, reports she is doing fairly well despite stressors of job. Work performance is good.  Plans to retire in 2021, at her 28 yr donald. Requested to reduce lexapro in case it is causing skin symptoms, med changed to Sertraline. Has seen derm - dx with psoriasis,stress related.   Anxiety is increased related to school stressors.  Pt has now retired, reports she is much improved and has started to wean off of Sertraline.  Pt reports her sister in law passed away which triggered an increase in anxiety and SCD/vertigo symptoms.  Multiple stressors which are contributing to her feeling overwhelmed - all have improved.  She tapered off of Sertraline; had flare of vertigo in interim with anxiety. Improved with resumption of Sertraline.  Pt had recurrence of paranoid ideations in interim which led to significant decompensation and recent active SI.     Delusional Disorder   MDD, single episode,severe - now improved in the last few days.   Generalized  Anxiety Disorder  Superior Canal Dehiscence, Psoriasis      GAF: 40  Strengths and Liabilities: Strength: Patient accepts guidance/feedback, Strength: Patient is expressive/articulate., Strength: Patient is intelligent.    Treatment Goals:  Specify outcomes written in observable, behavioral terms:   Anxiety: acquiring relapse prevention skills, reducing negative automatic thoughts, reducing physical symptoms of anxiety and reducing time spent worrying (<30 minutes/day)  Depression: acquiring relapse prevention skills, increasing energy, increasing interest in usual activities and increasing motivation    Treatment Plan/Recommendations:   Medication Management: The risks and benefits of medication were discussed with the patient.    - Inpatient psychiatric hospitalization recommended - pt and  decline.  They also decline IOP.   commits to being with pt 24/7 and is advised to remove firearm from home, secure all Rx and OTC meds, and remove any other potential weapons from home.   states if suicidal ideations recur, he will take the pt to ED.   - Start Abilify 2 mg daily. Discussed risks of tardive dyskinesia, drug induced parkinsonism, metabolic side effects, including wt gain, neuroleptic malignant syndrome   - Continue Sertraline 100 mg daily   - Rare use of Xanax 0.25 mg (one-half to one tab) daily p.r.n. anxiety - primarily for air travel.  Discussed risk of decreased RT, sedation, addictive potential, and not to mix with alcohol.  [place] prescription monitoring program reviewed.  - Pt instructed to go to ER with thoughts of harming self, others   - Call to report any worsening of symptoms or problems with the medication  - Psychotherapy has been recommended; she has declined   - Labs: CBC, CMP, TSH    - Visit today included increased complexity associated with the care of the episodic problem delusional disorder, MDD addressed and managing the longitudinal care of the patient due to the  serious and/or complex managed problem(s).    -Spent 58 min face to face with the pt; >50% time spent in counseling   -Supportive therapy and psychoeducation provided  -R/B/SE's of medications discussed with the pt who expresses understanding and chooses to take medications as prescribed.   -Pt instructed to call clinic, 911 or go to nearest emergency room if sxs worsen or pt is in   crisis. The pt expresses understanding.      Return to Clinic:  1 week

## 2025-03-06 ENCOUNTER — PATIENT MESSAGE (OUTPATIENT)
Dept: PSYCHIATRY | Facility: CLINIC | Age: 55
End: 2025-03-06
Payer: COMMERCIAL

## 2025-03-06 ENCOUNTER — RESULTS FOLLOW-UP (OUTPATIENT)
Dept: PSYCHIATRY | Facility: CLINIC | Age: 55
End: 2025-03-06
Payer: COMMERCIAL

## 2025-03-06 LAB
ALBUMIN SERPL-MCNC: 4.4 G/DL (ref 3.6–5.1)
ALBUMIN/GLOB SERPL: 1.6 (CALC) (ref 1–2.5)
ALP SERPL-CCNC: 82 U/L (ref 37–153)
ALT SERPL-CCNC: 21 U/L (ref 6–29)
AST SERPL-CCNC: 21 U/L (ref 10–35)
BASOPHILS # BLD AUTO: 48 CELLS/UL (ref 0–200)
BASOPHILS NFR BLD AUTO: 0.7 %
BILIRUB SERPL-MCNC: 0.9 MG/DL (ref 0.2–1.2)
BLASTS # BLD: NORMAL CELLS/UL
BLASTS NFR BLD MANUAL: NORMAL %
BUN SERPL-MCNC: 13 MG/DL (ref 7–25)
BUN/CREAT SERPL: NORMAL (CALC) (ref 6–22)
CALCIUM SERPL-MCNC: 9.8 MG/DL (ref 8.6–10.4)
CHLORIDE SERPL-SCNC: 101 MMOL/L (ref 98–110)
CO2 SERPL-SCNC: 30 MMOL/L (ref 20–32)
CREAT SERPL-MCNC: 0.68 MG/DL (ref 0.5–1.03)
EGFR: 102 ML/MIN/1.73M2
EOSINOPHIL # BLD AUTO: 82 CELLS/UL (ref 15–500)
EOSINOPHIL NFR BLD AUTO: 1.2 %
ERYTHROCYTE [DISTWIDTH] IN BLOOD BY AUTOMATED COUNT: 12 % (ref 11–15)
GLOBULIN SER CALC-MCNC: 2.7 G/DL (CALC) (ref 1.9–3.7)
GLUCOSE SERPL-MCNC: 91 MG/DL (ref 65–99)
HCT VFR BLD AUTO: 43.7 % (ref 35–45)
HGB BLD-MCNC: 14.3 G/DL (ref 11.7–15.5)
LYMPHOCYTES # BLD AUTO: 1652 CELLS/UL (ref 850–3900)
LYMPHOCYTES NFR BLD AUTO: 24.3 %
MCH RBC QN AUTO: 30.5 PG (ref 27–33)
MCHC RBC AUTO-ENTMCNC: 32.7 G/DL (ref 32–36)
MCV RBC AUTO: 93.2 FL (ref 80–100)
METAMYELOCYTES # BLD: NORMAL CELLS/UL
METAMYELOCYTES NFR BLD MANUAL: NORMAL %
MONOCYTES # BLD AUTO: 571 CELLS/UL (ref 200–950)
MONOCYTES NFR BLD AUTO: 8.4 %
MYELOCYTES # BLD: NORMAL CELLS/UL
MYELOCYTES NFR BLD MANUAL: NORMAL %
NEUTROPHILS # BLD AUTO: 4447 CELLS/UL (ref 1500–7800)
NEUTROPHILS NFR BLD AUTO: 65.4 %
NEUTS BAND # BLD: NORMAL CELLS/UL (ref 0–750)
NEUTS BAND NFR BLD MANUAL: NORMAL %
NRBC # BLD: NORMAL CELLS/UL
NRBC BLD-RTO: NORMAL /100 WBC
PLATELET # BLD AUTO: 238 THOUSAND/UL (ref 140–400)
PMV BLD REES-ECKER: 11.3 FL (ref 7.5–12.5)
POTASSIUM SERPL-SCNC: 4.1 MMOL/L (ref 3.5–5.3)
PROMYELOCYTES # BLD: NORMAL CELLS/UL
PROMYELOCYTES NFR BLD MANUAL: NORMAL %
PROT SERPL-MCNC: 7.1 G/DL (ref 6.1–8.1)
RBC # BLD AUTO: 4.69 MILLION/UL (ref 3.8–5.1)
SERVICE CMNT-IMP: NORMAL
SODIUM SERPL-SCNC: 140 MMOL/L (ref 135–146)
TSH SERPL-ACNC: 0.76 MIU/L (ref 0.4–4.5)
VARIANT LYMPHS NFR BLD: NORMAL % (ref 0–10)
WBC # BLD AUTO: 6.8 THOUSAND/UL (ref 3.8–10.8)

## 2025-03-07 ENCOUNTER — TELEPHONE (OUTPATIENT)
Dept: PSYCHIATRY | Facility: CLINIC | Age: 55
End: 2025-03-07
Payer: COMMERCIAL

## 2025-03-08 NOTE — TELEPHONE ENCOUNTER
"I called the pt.  She reports she is better. "I am doing ok, I am actually with my , son and daughter in law for a weekend get away at [facility] in [place]."  Pt reports she has started Abilify.  Paranoid ideations are improving and she has had no suicidal ideations - "I will be here for my grandchildren."   We discussed the lab results she sent over - repeat Hb/Hct are within normal range.  She reduced Vitamin D from 23127 iu to 5000 iu daily, and Vitamin B12 from 5000 mcg to 1000 mcg daily.   Pt expressed gratitude for the phone call and sounded upbeat on the phone.   Will schedule follow up virtually within 10 days.   "

## 2025-03-10 ENCOUNTER — PATIENT MESSAGE (OUTPATIENT)
Dept: PSYCHIATRY | Facility: CLINIC | Age: 55
End: 2025-03-10
Payer: COMMERCIAL

## 2025-03-12 DIAGNOSIS — F22 DELUSIONAL DISORDER: ICD-10-CM

## 2025-03-12 RX ORDER — ARIPIPRAZOLE 2 MG/1
2 TABLET ORAL DAILY
Qty: 30 TABLET | Refills: 0 | Status: SHIPPED | OUTPATIENT
Start: 2025-03-12 | End: 2025-03-13 | Stop reason: DRUGHIGH

## 2025-03-13 ENCOUNTER — OFFICE VISIT (OUTPATIENT)
Dept: PSYCHIATRY | Facility: CLINIC | Age: 55
End: 2025-03-13
Payer: COMMERCIAL

## 2025-03-13 DIAGNOSIS — F41.1 GAD (GENERALIZED ANXIETY DISORDER): ICD-10-CM

## 2025-03-13 DIAGNOSIS — F22 DELUSIONAL DISORDER: ICD-10-CM

## 2025-03-13 DIAGNOSIS — F33.1 MDD (MAJOR DEPRESSIVE DISORDER), RECURRENT EPISODE, MODERATE: ICD-10-CM

## 2025-03-13 PROCEDURE — 1159F MED LIST DOCD IN RCRD: CPT | Mod: CPTII,95,, | Performed by: PSYCHIATRY & NEUROLOGY

## 2025-03-13 PROCEDURE — 98006 SYNCH AUDIO-VIDEO EST MOD 30: CPT | Mod: 95,,, | Performed by: PSYCHIATRY & NEUROLOGY

## 2025-03-13 PROCEDURE — 1160F RVW MEDS BY RX/DR IN RCRD: CPT | Mod: CPTII,95,, | Performed by: PSYCHIATRY & NEUROLOGY

## 2025-03-13 PROCEDURE — G2211 COMPLEX E/M VISIT ADD ON: HCPCS | Mod: 95,,, | Performed by: PSYCHIATRY & NEUROLOGY

## 2025-03-13 RX ORDER — ARIPIPRAZOLE 5 MG/1
5 TABLET ORAL DAILY
Qty: 30 TABLET | Refills: 0 | Status: SHIPPED | OUTPATIENT
Start: 2025-03-13

## 2025-03-13 NOTE — PROGRESS NOTES
Outpatient Psychiatry Follow Up Visit (MD/NP)  03/13/2025    The patient location is: [address] [place] [address]   The chief complaint leading to consultation is: delusional disorder, depression, anxiety, OCD    Visit type: audiovisual    Face to Face time with patient: 37 mins  40 minutes of total time spent on the encounter, which includes face to face time and non-face to face time preparing to see the patient (eg, review of tests), Obtaining and/or reviewing separately obtained history, Documenting clinical information in the electronic or other health record, Independently interpreting results (not separately reported) and communicating results to the patient/family/caregiver, or Care coordination (not separately reported).     Each patient to whom he or she provides medical services by telemedicine is:  (1) informed of the relationship between the physician and patient and the respective role of any other health care provider with respect to management of the patient; and (2) notified that he or she may decline to receive medical services by telemedicine and may withdraw from such care at any time.    Gaby Omar Álvarez, a 54 y.o. female, presenting for follow up visit. Met with patient and her .     Reason for Encounter: self-referral. Patient complains of anxiety, depression.     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     Pt reports this medical stressor has occurred along with problems with her 24 yo daughter (not getting along with her father), her son graduating from high school.   She has taught x 23 years and she reports an incident occurred at school in the month of May which has caused her significant distress. She realized that during [employer] testing, there was something written on the wall that  "needed to be flipped over so the student would not have the information during the testing.   Pt did flip the info over within a few minutes, but was very bothered that she had done this so she admitted her wrong doing to her principal.  Pt reports she ended up being written up - the first time this has ever happened to her which has been very distressing for her.  Additionally, she reports there was a meeting at school with the student and other team members and she was so concerned they would talk about her, that she decided to record the conversation on her phone.  She realized later that nothing actually did record on her phone, but she has lived in intense fear that she may lose her job.  She also feels her coworkers are treating her differently, and she is upset that no one has asked her how she is doing.   She denies hx of davina.   She denies hx of auditory/visual hallucinations. Pt is paranoid that coworkers are plotting against her and talking about her. She does feel friends and coworkers are talking about her and are plotting together.    Past Psychiatric History:  Prior diagnosis:  none  Inpatient psychiatric tx: none   Outpatient psychiatric tx:  None     Prior medications:   2005 prescribed something x 2 days - Cymbalta or Wellbutrin - sick to stomach, did not take it PCP  Lexapro, Clonazepam  She tried Buspirone x 1 week, really brought her down, crying, it was rough.      Left work due to anxiety in 2010 - mother ill at time   Since under my care:  Abilify - weaned off, Sertraline   Prior suicide attempts: none  Prior hx self harm: none   Prior psychotherapy: none  Prior psychological testing:  None     Past medical history:  Superior canal dehiscence, positional vertigo   Atopic dermatitis       INTERIM HISTORY:  "Ok, still a rough rode for me."     Pt presents for follow up.    She is taking Sertraline 100 mg daily and Abilify 2 mg daily.    Med issues: SCD, vertigo, and atopic dermatitis.     She " "feels like there is now friction with her  and feels trust has broken down.  She accused him through her sister in law of cheating on her.  Here is my last piece of anxiety - she had baby, oh my God I may have been involved.  Then getting suspicious signals and snaps from the parents.    kept saying drop it.  Family encouraged her to visit.  She ordered test kit to prove she did not have herpes.  Are they trying to julius me because I went back and held the baby a second time.  "But I know the baby did not have herpes."   Son involved - he is comforting.   telling her she is paranoid.  She went to speak to her nieces since our last visit - she was alone with them and was crying.  Too overwhelming.  She feels bad that she ordered a test and then held a baby, even though she knows she did not have herpes.   makes comments like "I am going to win this fight."  Does not know who to trust.   They are speaking but not truly open.   gets mad and yells and creates more anxiety for her.  She emphatically denies suicidal ideations - "I cannot do that to her children and grandchildren."  Then  says what about me.  Son spending the last two weekends with them.   She feels her son saved her life,  takes offense to this.  Son lives in [place].  He encouraged her not to speak with  present.   Just needs to build trust back.    is talking a lot to his sister - she is not.  She used to be close to her sister in law who passed away.      She is tolerating Abilify.  "I am way over thinking about the baby having herpes and my giving it to him."  Now it has turned into her relationship with her  - she is looking into things he is saying.  She challenged him about how he is speaking.  "He made a comment about getting HIS house in order."   She acknowledges she is reading into things.  "I hit a rabbit hole the other day - the baby's father handed her a mony bear and " "she started crying. They were asking him is there anything else you want to say.   She learned it was a voice recording bear.  She was worried they could be recording her.   then told her she is losing her mind.   Pt declines for me to speak to her  today.  Her  has locked up the firearms.  She admits last week she was trying codes to get into the safe.  "That is not even a thought on my mind."   Plans to have space from nieces.  Work on her marriage. Acknowledges she will take wall down.  Take care of me first.   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  It is great therapy for her.   Appetite a little better - still watching sodium.  Having boiled eggs.   Anxiety, caffeine, sodium brings on the balance issues.   Sleeping fine - really well.   Sits by the pool a lot.   Slightly depressed (10 the worst, maybe a 2).  She is hopeful.  Concerned about stress on family.  Feels selfish.  Denies AH/VH  No HI.   Denies suicidal/homicidal ideations.    She is under care of Dr. Bowden.      Denies homicidal ideations.  Denies symptoms of davina.  Pt denies AH/VH.  + paranoid delusions, IOR.   No self harm or violence.   Does not drink alcohol.  She denies substance use.     THE [place] PROTOCOL SUICIDE SCREEN  I[]I Patient unable or unwilling to participate.   has firearms - has now secured and  ammunition.     Always ask questions 1 and 2:   I[x]I Y - Low Risk  I[]I N  1) Wish To Be Dead: In the past month, have you wished you were dead or wished you could go to sleep and not wake up?: **   I[x]I Y - Low Risk  I[]I N  2) Non-Specific Active Suicidal Thoughts: In the past month, have you actually had any thoughts about killing yourself?: **    If YES to 2, ask questions 3, 4, 5, 6 and 7:  If NO to 2, skip to question 7:    I[]I Y - Moderate Risk  I[]I N  I[]I N/A  3) Active Suicidal Ideation with Any Methods (Not Plan) without Intent to Act: In the " past month, have you been thinking about how you might do this?        I[]I Y - High Risk          I[]I N  I[]I N/A  4) Active Suicidal Ideation with Some Intent to Act, without Specific Plan: In the past month, have you had these thoughts and had some intention of acting on them?: **  I[]I Y - High Risk          I[]I N  I[]I N/A  5) Active Suicidal Ideation with Specific Plan: In the past month, have you started to work out or already worked out the details of how to kill yourself?: **  I[]I Y - High Risk          I[]I N  I[]I N/A  6) Active Suicidal Ideation with Specific Plan and Intent: DO YOU INTEND TO CARRY OUT THIS PLAN?: **    Always Ask Question 7:   Suicidal Behavior: Have you done anything, started to do anything, or prepared to do anything to end your life...  I[x]I Y - Moderate Risk  I[]I N  Ever in your lifetime?: **  I[x]I Y - High Risk          I[]I N  In the past 3 months?: **    Examples: Collected pills, obtained a gun, gave away valuables, wrote a will or suicide note, took out pills but didn't swallow any, held a gun but changed mind or it was grabbed from hand, went to the roof but didn't jump; or actually took pills, tried to shoot self, cut self, tried to hang self, etc.      NOTE: The use of standardized rating scales and safety contracts can aid in risk assessment, but do not substitute for clinical judgment. My clinical assessment is in agreement with the [place] Protocol Suicide Screen.    Psychotherapy:   Target symptoms: paranoia, anxiety, depression  Why chosen therapy is appropriate versus another modality: relevant to diagnosis, patient responds to this modality  Outcome monitoring methods: self-report, observation  Therapeutic intervention type: supportive psychotherapy, brief CBT, insight oriented   Topics discussed/themes: building skills sets for symptom management, symptom recognition  The patient's response to the intervention is accepting. The patient's progress toward  "treatment goals is fair progress.  Duration of intervention: 18 minutes    Medications:   Sertraline 100 mg every other day    Abilify 2 mg daily   Xanax 0.25 mg daily prn anxiety - none in one week   Review Of Systems:     GENERAL:  weight stable 122 lbs (low was 117 lbs)   CARDIOVASCULAR:  No chest pain or recent palpitations   MUSCULOSKELETAL:  No pain or stiffness of the joints  DERM: atopic dermatitis  Neuro: SCD/vertigo   Current Evaluation:     Nutritional Screening: Considering the patient's height and weight, medications, medical history and preferences, should a referral be made to the dietitian? no    Constitutional  Vitals:  Most recent vital signs, dated more than 90 days prior to this appointment, were reviewed (not available)     General:  age appropriate, normal weight, note wt loss, neatly groomed, good eye contact      Musculoskeletal  Muscle Strength/Tone:  No tremor observed    Gait & Station:  Seated      Psychiatric  Speech:  no latency; no press   Mood & Affect:  "I am ok today"  Full    Thought Process:  Linear with questions    Associations:  No RAMAKRISHNA    Thought Content:  no suicidality, no homicidality, hallucinations: (auditory: no, visual: no), + paranoid ideations    Insight:  intact   Judgement: Intact    Orientation:  grossly intact, person, place, situation, time/date, day of week, month of year, year   Memory: intact for content of interview, able to remember recent events- no, able to remember remote events- yes   Language: grossly intact   Attention Span & Concentration:  able to focus   Fund of Knowledge:  intact and appropriate to age and level of education, familiar with aspects of current personal life       Functioning in Relationships:  Spouse/partner: supportive  Peers: limited   Employers:  Retired, now has art business      Medications  Outpatient Encounter Medications as of 3/13/2025   Medication Sig Dispense Refill    ALPRAZolam (XANAX) 0.25 MG tablet Take one-half to one " tablet po daily prn anxiety 30 tablet 0    ARIPiprazole (ABILIFY) 2 MG Tab Take 1 tablet (2 mg total) by mouth once daily. Dx Code F22 30 tablet 0    ascorbic acid, vitamin C, (VITAMIN C) 500 MG tablet Take 500 mg by mouth once daily.      betahistine HCl (BETAHISTINE, BULK, MISC) by Misc.(Non-Drug; Combo Route) route 2 (two) times a day. Taking as needed - tapered down      cyanocobalamin (VITAMIN B-12) 1000 MCG tablet Take 1,000 mcg by mouth once daily.      DUPIXENT  mg/2 mL PnIj Inject 300 mg into the skin every 14 (fourteen) days.      multivit-mins no.63/iron/folic (M-VIT ORAL) Take by mouth once daily. Over 51 yo vitamin      sertraline (ZOLOFT) 100 MG tablet Take 1 tablet (100 mg total) by mouth every evening. 90 tablet 1    vitamin D (VITAMIN D3) 1000 units Tab Take 5,000 Units by mouth once daily.      [DISCONTINUED] ARIPiprazole (ABILIFY) 2 MG Tab Take 1 tablet (2 mg total) by mouth once daily. 15 tablet 0     No facility-administered encounter medications on file as of 3/13/2025.         Assessment - Diagnosis - Goals:     Impression: pt presents for intake following high anxiety/stress related to health and occupational stressors. Pt's symptoms are bordering on delusional.  Family has been very concerned and her sister in law reached out to schedule this appt for her.  Pt with limited progress with addition of Lexapro. Addition of Abilify last visits showing small benefit.  Now with titration of Lexapro and taking Abilify several months and taking medical leave of absence, the patient is improved. + wt gain on Abilify - request to wean off.  No significant decompensation off of Abilify.  Pt has returned to work, reports she is doing fairly well despite stressors of job. Work performance is good.  Plans to retire in 2021, at her 28 yr donald. Requested to reduce lexapro in case it is causing skin symptoms, med changed to Sertraline. Has seen derm - dx with psoriasis,stress related.   Anxiety is  increased related to school stressors.  Pt has now retired, reports she is much improved and has started to wean off of Sertraline.  Pt reports her sister in law passed away which triggered an increase in anxiety and SCD/vertigo symptoms.  Multiple stressors which are contributing to her feeling overwhelmed - all have improved.  She tapered off of Sertraline; had flare of vertigo in interim with anxiety. Improved with resumption of Sertraline.  Pt had recurrence of paranoid ideations which led to significant decompensation and recent active SI.  Pt reports she is still symptomatic, but improved.  No further SI reported.     Delusional Disorder   MDD, single episode, moderate - pt reports mood is improving.   Generalized Anxiety Disorder  Superior Canal Dehiscence, Psoriasis      GAF: 40  Strengths and Liabilities: Strength: Patient accepts guidance/feedback, Strength: Patient is expressive/articulate., Strength: Patient is intelligent.    Treatment Goals:  Specify outcomes written in observable, behavioral terms:   Anxiety: acquiring relapse prevention skills, reducing negative automatic thoughts, reducing physical symptoms of anxiety and reducing time spent worrying (<30 minutes/day)  Depression: acquiring relapse prevention skills, increasing energy, increasing interest in usual activities and increasing motivation    Treatment Plan/Recommendations:   Medication Management: The risks and benefits of medication were discussed with the patient.    - Inpatient psychiatric hospitalization recommended - pt and  decline.  They also decline IOP.   committed to being with pt 24/7 and was advised to remove firearm from home, secure all Rx and OTC meds, and remove any other potential weapons from home.   stated if suicidal ideations recur, he will take the pt to ED.   - Increase Abilify to 5 mg daily. Discussed risks of tardive dyskinesia, drug induced parkinsonism, metabolic side effects, including wt  gain, neuroleptic malignant syndrome   - Continue Sertraline 100 mg daily   - Rare use of Xanax 0.25 mg (one-half to one tab) daily p.r.n. anxiety - primarily for air travel.  Discussed risk of decreased RT, sedation, addictive potential, and not to mix with alcohol.  [place] prescription monitoring program reviewed.  - Pt instructed to go to ER with thoughts of harming self, others   - Call to report any worsening of symptoms or problems with the medication  - Psychotherapy and IOP has been recommended; she has declined both   - Visit today included increased complexity associated with the care of the episodic problem delusional disorder, MDD addressed and managing the longitudinal care of the patient due to the serious and/or complex managed problem(s).    -Spent 37 min face to face with the pt; >50% time spent in counseling   -Supportive therapy and psychoeducation provided  -R/B/SE's of medications discussed with the pt who expresses understanding and chooses to take medications as prescribed.   -Pt instructed to call clinic, 911 or go to nearest emergency room if sxs worsen or pt is in   crisis. The pt expresses understanding.      Return to Clinic:  1 week

## 2025-03-16 PROBLEM — F33.42 MDD (MAJOR DEPRESSIVE DISORDER), RECURRENT, IN FULL REMISSION: Status: RESOLVED | Noted: 2024-08-22 | Resolved: 2025-03-16

## 2025-03-16 PROBLEM — F33.1 MDD (MAJOR DEPRESSIVE DISORDER), RECURRENT EPISODE, MODERATE: Status: ACTIVE | Noted: 2025-03-16

## 2025-03-16 PROBLEM — F32.A DEPRESSION: Status: RESOLVED | Noted: 2025-03-05 | Resolved: 2025-03-16

## 2025-03-17 ENCOUNTER — TELEPHONE (OUTPATIENT)
Dept: PSYCHIATRY | Facility: CLINIC | Age: 55
End: 2025-03-17
Payer: COMMERCIAL

## 2025-03-17 NOTE — TELEPHONE ENCOUNTER
----- Message from Stephanie Mcpherson MD sent at 3/16/2025 10:43 PM CDT -----  Please contact pt and offer virtual or in person follow up Thurs 3/20 at 9:00, 30 mins.  Thank you

## 2025-03-17 NOTE — TELEPHONE ENCOUNTER
Called an spoke to pt she can do 03/20/2025 @ 9 AM virtual. Getting the schedule opened so can get her scheduled for then

## 2025-03-20 ENCOUNTER — OFFICE VISIT (OUTPATIENT)
Dept: PSYCHIATRY | Facility: CLINIC | Age: 55
End: 2025-03-20
Payer: COMMERCIAL

## 2025-03-20 DIAGNOSIS — F22 DELUSIONAL DISORDER: ICD-10-CM

## 2025-03-20 DIAGNOSIS — F41.1 GAD (GENERALIZED ANXIETY DISORDER): ICD-10-CM

## 2025-03-20 DIAGNOSIS — F33.1 MDD (MAJOR DEPRESSIVE DISORDER), RECURRENT EPISODE, MODERATE: ICD-10-CM

## 2025-03-20 PROCEDURE — 90833 PSYTX W PT W E/M 30 MIN: CPT | Mod: 95,,, | Performed by: PSYCHIATRY & NEUROLOGY

## 2025-03-20 PROCEDURE — 98006 SYNCH AUDIO-VIDEO EST MOD 30: CPT | Mod: 95,,, | Performed by: PSYCHIATRY & NEUROLOGY

## 2025-03-20 PROCEDURE — G2211 COMPLEX E/M VISIT ADD ON: HCPCS | Mod: 95,,, | Performed by: PSYCHIATRY & NEUROLOGY

## 2025-03-20 NOTE — PROGRESS NOTES
Outpatient Psychiatry Follow Up Visit (MD/NP)  03/20/2025    The patient location is: [address] [place] [address]   The chief complaint leading to consultation is: delusional disorder, depression, anxiety, OCD    Visit type: audiovisual    Face to Face time with patient: 37 mins  40 minutes of total time spent on the encounter, which includes face to face time and non-face to face time preparing to see the patient (eg, review of tests), Obtaining and/or reviewing separately obtained history, Documenting clinical information in the electronic or other health record, Independently interpreting results (not separately reported) and communicating results to the patient/family/caregiver, or Care coordination (not separately reported).     Each patient to whom he or she provides medical services by telemedicine is:  (1) informed of the relationship between the physician and patient and the respective role of any other health care provider with respect to management of the patient; and (2) notified that he or she may decline to receive medical services by telemedicine and may withdraw from such care at any time.    Gaby Álvarez, a 54 y.o. female, presenting for follow up visit. Met with patient.    Reason for Encounter: self-referral. Patient complains of anxiety, depression.     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     Pt reports this medical stressor has occurred along with problems with her 22 yo daughter (not getting along with her father), her son graduating from high school.   She has taught x 23 years and she reports an incident occurred at school in the month of May which has caused her significant distress. She realized that during [employer] testing, there was something written on the wall that needed to be  "flipped over so the student would not have the information during the testing.   Pt did flip the info over within a few minutes, but was very bothered that she had done this so she admitted her wrong doing to her principal.  Pt reports she ended up being written up - the first time this has ever happened to her which has been very distressing for her.  Additionally, she reports there was a meeting at school with the student and other team members and she was so concerned they would talk about her, that she decided to record the conversation on her phone.  She realized later that nothing actually did record on her phone, but she has lived in intense fear that she may lose her job.  She also feels her coworkers are treating her differently, and she is upset that no one has asked her how she is doing.   She denies hx of davina.   She denies hx of auditory/visual hallucinations. Pt is paranoid that coworkers are plotting against her and talking about her. She does feel friends and coworkers are talking about her and are plotting together.    Past Psychiatric History:  Prior diagnosis:  none  Inpatient psychiatric tx: none   Outpatient psychiatric tx:  None     Prior medications:   2005 prescribed something x 2 days - Cymbalta or Wellbutrin - sick to stomach, did not take it PCP  Lexapro, Clonazepam  She tried Buspirone x 1 week, really brought her down, crying, it was rough.      Left work due to anxiety in 2010 - mother ill at time   Since under my care:  Abilify - weaned off, Sertraline   Prior suicide attempts: none  Prior hx self harm: none   Prior psychotherapy: none  Prior psychological testing:  None     Past medical history:  Superior canal dehiscence, positional vertigo   Atopic dermatitis       INTERIM HISTORY:  "Ok, still a rough rode for me."     Pt presents for follow up.    She is taking Sertraline 100 mg daily and Abilify 5 mg daily.    Med issues: SCD, vertigo, and atopic dermatitis.     "I am doing ok, " "working on me."   Working on relationships with family.  Building trust back with ; has been painting more, which is what she is doing currently.  Not much conversation with nieces.  She reflects back on the recent fears, delusional thoughts, and paranoia.  She is more concerned how her  responded to the episode today.  Most of the day trying to build inventory, running errands, working on pool,    telling her drop it - created more pressure, "I want to talk about it."  Argument b/t  and son (he saved my life,  got defensive)  The patient questions that maybe her  thought she was trying to leave him.   Spoke to a cousin Qiana who was surprised that the patient did not reach out to her when she was struggling.  The patient realizes she should have done this.  Her  is more action oriented, and wanted to fix the situation.  The patient tends to be very emotional, and wanted to have support rather than an action plan.    The patient does feel paranoia is improving.  Things are still awkward with her niece.  1 yo came and swim, all cordial and fine; then she apologize for anything I have done, hurt heart, our rock.  It helped her to feel better.  She fears conflict and drama.   Did not talk about specifics - that was it.  She feels like her family was trying to get her to say more.  Anxiety completely consumed her - never would have thought about it. Did not know who to trust.  "Oh my gosh, almost embarrassed that it got to this." The patient adamantly denies any further suicidal ideations.  She denies any homicidal ideations.  + worry about awkwardness.  Easter coming - she hopes that she can be comfortable around her family.  We talked about pending time with her family prior to the holiday, but in small doses.  Sleep fine.   Appetite ok - wt 120 lbs watches sodium  Eating a little more   Take beta histine - not regular basis -- prn   Can feel it ears pop, full. Balance " issues - usually once a year - things that affect vertigo include chocolate, alcohol, caffeine, sodium, anxiety.  She knows herself swaying, but does not had any severe vertigo.  She is under care of Dr. Bowden.  She states that she still gets some suspicious snaps from her niece occasionally.  I suspect she is still having some ideas of reference.    Denies suicidal/homicidal ideations.    Denies symptoms of davina.  Pt denies AH/VH.  + paranoid delusions, IOR.  Paranoia and ideas of reference are attenuating.  No self harm or violence.   Does not drink alcohol.  She denies substance use.     THE [place] PROTOCOL SUICIDE SCREEN  I[]I Patient unable or unwilling to participate.   has firearms - has now secured and  ammunition.     Always ask questions 1 and 2:   I[x]I Y - Low Risk  I[]I N  1) Wish To Be Dead: In the past month, have you wished you were dead or wished you could go to sleep and not wake up?: **   I[x]I Y - Low Risk  I[]I N  2) Non-Specific Active Suicidal Thoughts: In the past month, have you actually had any thoughts about killing yourself?: **    If YES to 2, ask questions 3, 4, 5, 6 and 7:  If NO to 2, skip to question 7:    I[]I Y - Moderate Risk  I[]I N  I[]I N/A  3) Active Suicidal Ideation with Any Methods (Not Plan) without Intent to Act: In the past month, have you been thinking about how you might do this?        I[]I Y - High Risk          I[]I N  I[]I N/A  4) Active Suicidal Ideation with Some Intent to Act, without Specific Plan: In the past month, have you had these thoughts and had some intention of acting on them?: **  I[]I Y - High Risk          I[]I N  I[]I N/A  5) Active Suicidal Ideation with Specific Plan: In the past month, have you started to work out or already worked out the details of how to kill yourself?: **  I[]I Y - High Risk          I[]I N  I[]I N/A  6) Active Suicidal Ideation with Specific Plan and Intent: DO YOU INTEND TO CARRY OUT THIS PLAN?:  **    Always Ask Question 7:   Suicidal Behavior: Have you done anything, started to do anything, or prepared to do anything to end your life...  I[x]I Y - Moderate Risk  I[]I N  Ever in your lifetime?: **  I[x]I Y - High Risk          I[]I N  In the past 3 months?: **    Examples: Collected pills, obtained a gun, gave away valuables, wrote a will or suicide note, took out pills but didn't swallow any, held a gun but changed mind or it was grabbed from hand, went to the roof but didn't jump; or actually took pills, tried to shoot self, cut self, tried to hang self, etc.      NOTE: The use of standardized rating scales and safety contracts can aid in risk assessment, but do not substitute for clinical judgment. My clinical assessment is in agreement with the [place] Protocol Suicide Screen.    Psychotherapy:   Target symptoms: paranoia, anxiety, depression  Why chosen therapy is appropriate versus another modality: relevant to diagnosis, patient responds to this modality  Outcome monitoring methods: self-report, observation  Therapeutic intervention type: supportive psychotherapy, brief CBT, insight oriented   Topics discussed/themes: building skills sets for symptom management, symptom recognition, wise mind  The patient's response to the intervention is accepting. The patient's progress toward treatment goals is fair progress.  Duration of intervention:  20 minutes    Medications:   Sertraline 100 mg every other day    Abilify 5 mg daily   Xanax 0.25 mg daily prn anxiety - none in one week   Review Of Systems:     GENERAL:  weight stable 122 lbs (low was 117 lbs)   CARDIOVASCULAR:  No chest pain or recent palpitations   MUSCULOSKELETAL:  No pain or stiffness of the joints  DERM: atopic dermatitis  Neuro: SCD/vertigo   Current Evaluation:     Nutritional Screening: Considering the patient's height and weight, medications, medical history and preferences, should a referral be made to the dietitian?  "no    Constitutional  Vitals:  Most recent vital signs, dated more than 90 days prior to this appointment, were reviewed (not available)     General:  age appropriate, normal weight, neatly groomed, good eye contact      Musculoskeletal  Muscle Strength/Tone:  No tremor observed    Gait & Station:  Seated      Psychiatric  Speech:  no latency; no press   Mood & Affect:  "I am better, but it has been rough  Full    Thought Process:  Linear with questions    Associations:  No RAMAKRISHNA    Thought Content:  no suicidality, no homicidality, hallucinations: (auditory: no, visual: no), + paranoid ideations    Insight:  Fair   Judgement: Improved   Orientation:  grossly intact, person, place, situation, time/date, day of week, month of year, year   Memory: intact for content of interview, able to remember recent events- no, able to remember remote events- yes   Language: grossly intact   Attention Span & Concentration:  able to focus   Fund of Knowledge:  intact and appropriate to age and level of education, familiar with aspects of current personal life       Functioning in Relationships:  Spouse/partner:  Some conflicts  Peers: limited   Employers:  Retired, now has art business      Medications  Outpatient Encounter Medications as of 3/20/2025   Medication Sig Dispense Refill    ALPRAZolam (XANAX) 0.25 MG tablet Take one-half to one tablet po daily prn anxiety 30 tablet 0    ARIPiprazole (ABILIFY) 5 MG Tab Take 1 tablet (5 mg total) by mouth once daily. Dx Code F22 30 tablet 0    ascorbic acid, vitamin C, (VITAMIN C) 500 MG tablet Take 500 mg by mouth once daily.      betahistine HCl (BETAHISTINE, BULK, MISC) by Misc.(Non-Drug; Combo Route) route 2 (two) times a day. Taking as needed - tapered down      cyanocobalamin (VITAMIN B-12) 1000 MCG tablet Take 1,000 mcg by mouth once daily.      DUPIXENT  mg/2 mL PnIj Inject 300 mg into the skin every 14 (fourteen) days.      multivit-mins no.63/iron/folic (M-VIT ORAL) Take " by mouth once daily. Over 51 yo vitamin      sertraline (ZOLOFT) 100 MG tablet Take 1 tablet (100 mg total) by mouth every evening. 90 tablet 1    vitamin D (VITAMIN D3) 1000 units Tab Take 5,000 Units by mouth once daily.       No facility-administered encounter medications on file as of 3/20/2025.         Assessment - Diagnosis - Goals:     Impression: pt presents for intake following high anxiety/stress related to health and occupational stressors. Pt's symptoms are bordering on delusional.  Family has been very concerned and her sister in law reached out to schedule this appt for her.  Pt with limited progress with addition of Lexapro. Addition of Abilify last visits showing small benefit.  Now with titration of Lexapro and taking Abilify several months and taking medical leave of absence, the patient is improved. + wt gain on Abilify - request to wean off.  No significant decompensation off of Abilify.  Pt has returned to work, reports she is doing fairly well despite stressors of job. Work performance is good.  Plans to retire in 2021, at her 28 yr donald. Requested to reduce lexapro in case it is causing skin symptoms, med changed to Sertraline. Has seen derm - dx with psoriasis,stress related.   Anxiety is increased related to school stressors.  Pt has now retired, reports she is much improved and has started to wean off of Sertraline.  Pt reports her sister in law passed away which triggered an increase in anxiety and SCD/vertigo symptoms.  Multiple stressors which are contributing to her feeling overwhelmed - all have improved.  She tapered off of Sertraline; had flare of vertigo in interim with anxiety. Improved with resumption of Sertraline.  Pt had recurrence of paranoid ideations which led to significant decompensation and recent active SI.  Pt reports she is still symptomatic, but improved.  No further SI reported.     Delusional Disorder   MDD, single episode, moderate - pt reports mood is improving.    Generalized Anxiety Disorder  Superior Canal Dehiscence, Psoriasis      GAF: 50  Strengths and Liabilities: Strength: Patient accepts guidance/feedback, Strength: Patient is expressive/articulate., Strength: Patient is intelligent.    Treatment Goals:  Specify outcomes written in observable, behavioral terms:   Anxiety: acquiring relapse prevention skills, reducing negative automatic thoughts, reducing physical symptoms of anxiety and reducing time spent worrying (<30 minutes/day)  Depression: acquiring relapse prevention skills, increasing energy, increasing interest in usual activities and increasing motivation    Treatment Plan/Recommendations:   Medication Management: The risks and benefits of medication were discussed with the patient.    - Inpatient psychiatric hospitalization was recommended several weeks ago- pt and  decline.  They also decline IOP.   committed to being with pt 24/7 and was advised to remove firearm from home, secure all Rx and OTC meds, and remove any other potential weapons from home.   stated if suicidal ideations recur, he will take the pt to ED.   - continue Abilify 5 mg daily. Discussed risks of tardive dyskinesia, drug induced parkinsonism, metabolic side effects, including wt gain, neuroleptic malignant syndrome   - Continue Sertraline 100 mg daily   - Rare use of Xanax 0.25 mg (one-half to one tab) daily p.r.n. anxiety - primarily for air travel.  Discussed risk of decreased RT, sedation, addictive potential, and not to mix with alcohol.  [place] prescription monitoring program reviewed.  - Pt instructed to go to ER with thoughts of harming self, others   - Call to report any worsening of symptoms or problems with the medication  - Psychotherapy and IOP has been recommended; she has declined both   - Visit today included increased complexity associated with the care of the episodic problem delusional disorder, MDD addressed and managing the longitudinal care  of the patient due to the serious and/or complex managed problem(s).    -Spent 48 min face to face with the pt; >50% time spent in counseling   -Supportive therapy and psychoeducation provided  -R/B/SE's of medications discussed with the pt who expresses understanding and chooses to take medications as prescribed.   -Pt instructed to call clinic, 911 or go to nearest emergency room if sxs worsen or pt is in   crisis. The pt expresses understanding.      Return to Clinic:  3 weeks

## 2025-03-24 ENCOUNTER — PATIENT MESSAGE (OUTPATIENT)
Dept: PSYCHIATRY | Facility: CLINIC | Age: 55
End: 2025-03-24
Payer: COMMERCIAL

## 2025-03-25 ENCOUNTER — TELEPHONE (OUTPATIENT)
Dept: PSYCHIATRY | Facility: CLINIC | Age: 55
End: 2025-03-25
Payer: COMMERCIAL

## 2025-03-25 NOTE — TELEPHONE ENCOUNTER
----- Message from Stephanie Mcpherson MD sent at 3/23/2025 11:44 PM CDT -----  Three weeks please, would prefer in person

## 2025-03-25 NOTE — TELEPHONE ENCOUNTER
Please advise I do not see any openings for 3 weeks. Please let me know if there is a date and time you would like to offer in clinic visit? As of now I do not see anything

## 2025-03-28 ENCOUNTER — PATIENT MESSAGE (OUTPATIENT)
Dept: OBSTETRICS AND GYNECOLOGY | Facility: CLINIC | Age: 55
End: 2025-03-28
Payer: COMMERCIAL

## 2025-03-28 DIAGNOSIS — Z12.31 SCREENING MAMMOGRAM FOR BREAST CANCER: Primary | ICD-10-CM

## 2025-04-03 NOTE — TELEPHONE ENCOUNTER
Called and scheduled pt for follow up on 04/25/2025 @ 830 AM explained Dr Mcpherson would prefer in clinic and she stated she could not do in clinic so she scheduled for virtual visit on 04/25/2025 @ 830 AM

## 2025-04-25 ENCOUNTER — PATIENT MESSAGE (OUTPATIENT)
Dept: PSYCHIATRY | Facility: CLINIC | Age: 55
End: 2025-04-25
Payer: COMMERCIAL

## 2025-05-01 ENCOUNTER — OFFICE VISIT (OUTPATIENT)
Dept: OBSTETRICS AND GYNECOLOGY | Facility: CLINIC | Age: 55
End: 2025-05-01
Payer: COMMERCIAL

## 2025-05-01 VITALS
WEIGHT: 127.81 LBS | DIASTOLIC BLOOD PRESSURE: 71 MMHG | SYSTOLIC BLOOD PRESSURE: 101 MMHG | BODY MASS INDEX: 21.82 KG/M2 | HEART RATE: 73 BPM | HEIGHT: 64 IN

## 2025-05-01 DIAGNOSIS — N95.2 VAGINAL ATROPHY: ICD-10-CM

## 2025-05-01 DIAGNOSIS — N89.8 VAGINAL IRRITATION: ICD-10-CM

## 2025-05-01 DIAGNOSIS — Z12.4 PAP SMEAR FOR CERVICAL CANCER SCREENING: ICD-10-CM

## 2025-05-01 DIAGNOSIS — Z01.419 ENCOUNTER FOR ANNUAL ROUTINE GYNECOLOGICAL EXAMINATION: Primary | ICD-10-CM

## 2025-05-01 PROCEDURE — 99999 PR PBB SHADOW E&M-EST. PATIENT-LVL III: CPT | Mod: PBBFAC,,, | Performed by: OBSTETRICS & GYNECOLOGY

## 2025-05-01 RX ORDER — ESTRADIOL 0.1 MG/G
CREAM VAGINAL
Qty: 42.5 G | Refills: 3 | Status: SHIPPED | OUTPATIENT
Start: 2025-05-01 | End: 2026-05-10

## 2025-05-01 NOTE — PROGRESS NOTES
Chief Complaint   Patient presents with    Well Woman       HISTORY OF PRESENT ILLNESS:   Gaby Álvarez is a 55 y.o. female  who presents for well woman exam.  Patient's last menstrual period was 2017.. Menopause at age  46   Never did HRT. She has no complaints dx with lichen sclerosis based on appearance, however our biopsy in  was negative. Reports has had the issues for over 10 years. She has significant pain during sex. She then started having dry skin on her elbows and legs and cracks in her finger skin and started on dubuxa and it is improved significantly.Told her it was atopic dermatitis. She will notice splits in the skin vaginally. Tried estrogen cream last time but didn't keep up with it.  Declines STD testing.      Past Medical History:   Diagnosis Date    Anxiety disorder     Migraine           Past Surgical History:   Procedure Laterality Date     SECTION      COLONOSCOPY N/A 2023    Procedure: COLONOSCOPY;  Surgeon: Adam Farrar MD;  Location: [facility];  Service: Endoscopy;  Laterality: N/A;    GALLBLADDER SURGERY             Social History     Socioeconomic History    Marital status:    Tobacco Use    Smoking status: Never    Smokeless tobacco: Never   Substance and Sexual Activity    Alcohol use: Yes     Comment: occasionally    Drug use: No     Social Drivers of Health     Financial Resource Strain: Low Risk  (2024)    Received from [facility]    Overall Financial Resource Strain (CARDIA)     Difficulty of Paying Living Expenses: Not hard at all   Food Insecurity: No Food Insecurity (2024)    Received from [facility]    Hunger Vital Sign     Worried About Running Out of Food in the Last Year: Never true     Ran Out of Food in the Last Year: Never true   Transportation Needs: No Transportation Needs (2024)    Received from [facility]    PRAPARE - Transportation     Lack of Transportation (Medical): No     Lack of Transportation  "(Non-Medical): No   Physical Activity: Insufficiently Active (2024)    Received from [facility]    Exercise Vital Sign     Days of Exercise per Week: 3 days     Minutes of Exercise per Session: 30 min   Stress: No Stress Concern Present (2024)    Received from [facility]    [employer] [place] [employer] - Occupational Stress Questionnaire     Feeling of Stress : Not at all   Housing Stability: Unknown (2022)    Received from [facility]    Housing Stability Vital Sign     Unable to Pay for Housing in the Last Year: No     In the last 12 months, was there a time when you did not have a steady place to sleep or slept in a shelter (including now)?: No       Family History   Problem Relation Name Age of Onset    Cancer Mother      Migraines Mother      Liver disease Father      Crohn's disease Father             OB History    Para Term  AB Living   2 2 2      SAB IAB Ectopic Multiple Live Births             # Outcome Date GA Lbr Leonidas/2nd Weight Sex Type Anes PTL Lv   2 Term            1 Term                COMPREHENSIVE GYN HISTORY:  PAP History: Denies abnormal Paps  Infection History: Denies STDs. Denies PID.  Benign History: Denies uterine fibroids. Denies ovarian cysts. Denies endometriosis Denies other conditions.  Cancer History: Denies cervical cancer. Denies uterine cancer or hyperplasia. Denies ovarian cancer. Denies vulvar cancer or pre-cancer. Denies vaginal cancer or pre-cancer. Denies breast cancer. Denies colon cancer.  Cycle: 12/mon/normal, menopause at 46, no HRT    ROS:  negative      /71 (BP Location: Left arm, Patient Position: Sitting)   Pulse 73   Ht 5' 4" (1.626 m)   Wt 58 kg (127 lb 12.8 oz)   LMP 2017   BMI 21.94 kg/m²     APPEARANCE: Well nourished, well developed, in no acute distress.  NECK: Neck symmetric  ABDOMEN: Soft. No tenderness or masses. No hernias. No hepatosplenomegaly.  BREASTS: Symmetrical, no skin changes or visible lesions. " No palpable masses, nipple discharge or adenopathy bilaterally.  PELVIC:   VULVA: No lesions. Normal female genitalia. Labia minora fused with labia majora, very thing skin at introitus, no fissures noted or plaques.   URETHRAL MEATUS: Normal size and location, no lesions, no prolapse.  URETHRA: No masses, tenderness, prolapse or scarring.  VAGINA: atrophic, scant yellow discharge, no significant cystocele or rectocele.  CERVIX: atrophic, flush with vaginal walls and os stenotic, No lesions and discharge.  UTERUS: Normal size, regular shape, mobile, non-tender, bladder base nontender.  ADNEXA: No masses or tenderness.  PERINEUM: Normal, mo masses      1. Encounter for annual routine gynecological examination    2. Pap smear for cervical cancer screening    3. Vaginal atrophy    4. Vaginal irritation          A/P 1. Routine gyn annual exam. s/p normal breast exam and MMG done.  Pap without HPV cotesting ordered. STD testing:  declined. Lipid Profile, needed every 5 years, up to date. Fasting glucose, needed every 3 years, up to date.   TSH, needed every 5 years, up to date.   Colonoscopy up to date.   2. Discussed vaginal atrophy as causing vaginal dryness and pain during intercourse. Affirm done but likely 2/2 atrophy. Feel that will need topical estrogen to start with. Discussed risk of absorption is very low so low risk of breast ca, heart disease or stroke. Possible for lichen sclerosis but no white plaques and biopsy was negative. May also need vaginal dilation with pelvic floor PT. Discussed will likely need to do the topical estrogen for a month then do PT if not improved.       F/u in 1 yr or PRN

## 2025-05-08 ENCOUNTER — PATIENT MESSAGE (OUTPATIENT)
Dept: OBSTETRICS AND GYNECOLOGY | Facility: HOSPITAL | Age: 55
End: 2025-05-08
Payer: COMMERCIAL

## 2025-05-20 ENCOUNTER — OFFICE VISIT (OUTPATIENT)
Dept: PSYCHIATRY | Facility: CLINIC | Age: 55
End: 2025-05-20
Payer: COMMERCIAL

## 2025-05-20 DIAGNOSIS — F22 DELUSIONAL DISORDER: ICD-10-CM

## 2025-05-20 DIAGNOSIS — F33.41 MDD (MAJOR DEPRESSIVE DISORDER), RECURRENT, IN PARTIAL REMISSION: ICD-10-CM

## 2025-05-20 DIAGNOSIS — F41.1 GAD (GENERALIZED ANXIETY DISORDER): ICD-10-CM

## 2025-05-20 PROCEDURE — 1160F RVW MEDS BY RX/DR IN RCRD: CPT | Mod: CPTII,95,, | Performed by: PSYCHIATRY & NEUROLOGY

## 2025-05-20 PROCEDURE — 1159F MED LIST DOCD IN RCRD: CPT | Mod: CPTII,95,, | Performed by: PSYCHIATRY & NEUROLOGY

## 2025-05-20 PROCEDURE — 98006 SYNCH AUDIO-VIDEO EST MOD 30: CPT | Mod: 95,,, | Performed by: PSYCHIATRY & NEUROLOGY

## 2025-05-20 PROCEDURE — G2211 COMPLEX E/M VISIT ADD ON: HCPCS | Mod: 95,,, | Performed by: PSYCHIATRY & NEUROLOGY

## 2025-05-20 NOTE — PROGRESS NOTES
Outpatient Psychiatry Follow Up Visit (MD/NP)  05/20/2025    The patient location is: [address] [place] [address]   The chief complaint leading to consultation is: delusional disorder, depression, anxiety, OCD    Visit type: audiovisual    Face to Face time with patient: 37 mins  40 minutes of total time spent on the encounter, which includes face to face time and non-face to face time preparing to see the patient (eg, review of tests), Obtaining and/or reviewing separately obtained history, Documenting clinical information in the electronic or other health record, Independently interpreting results (not separately reported) and communicating results to the patient/family/caregiver, or Care coordination (not separately reported).     Each patient to whom he or she provides medical services by telemedicine is:  (1) informed of the relationship between the physician and patient and the respective role of any other health care provider with respect to management of the patient; and (2) notified that he or she may decline to receive medical services by telemedicine and may withdraw from such care at any time.    Gaby Álvarez, a 55 y.o. female, presenting for follow up visit. Met with patient.    Reason for Encounter: self-referral. Patient complains of anxiety, depression.     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     Pt reports this medical stressor has occurred along with problems with her 24 yo daughter (not getting along with her father), her son graduating from high school.   She has taught x 23 years and she reports an incident occurred at school in the month of May which has caused her significant distress. She realized that during [employer] testing, there was something written on the wall that needed to be  flipped over so the student would not have the information during the testing.   Pt did flip the info over within a few minutes, but was very bothered that she had done this so she admitted her wrong doing to her principal.  Pt reports she ended up being written up - the first time this has ever happened to her which has been very distressing for her.  Additionally, she reports there was a meeting at school with the student and other team members and she was so concerned they would talk about her, that she decided to record the conversation on her phone.  She realized later that nothing actually did record on her phone, but she has lived in intense fear that she may lose her job.  She also feels her coworkers are treating her differently, and she is upset that no one has asked her how she is doing.   She denies hx of davina.   She denies hx of auditory/visual hallucinations. Pt is paranoid that coworkers are plotting against her and talking about her. She does feel friends and coworkers are talking about her and are plotting together.    Past Psychiatric History:  Prior diagnosis:  none  Inpatient psychiatric tx: none   Outpatient psychiatric tx:  None     Prior medications:   2005 prescribed something x 2 days - Cymbalta or Wellbutrin - sick to stomach, did not take it PCP  Lexapro, Clonazepam  She tried Buspirone x 1 week, really brought her down, crying, it was rough.      Left work due to anxiety in 2010 - mother ill at time   Since under my care:  Abilify - weaned off, Sertraline   Prior suicide attempts: none  Prior hx self harm: none   Prior psychotherapy: none  Prior psychological testing:  None     Past medical history:  Superior canal dehiscence, positional vertigo   Atopic dermatitis       INTERIM HISTORY:  Pt presents for follow up.    She is taking Sertraline 100 mg daily only.   Med issues: SCD, vertigo, and atopic dermatitis.     History of Present Illness    CHIEF COMPLAINT:  Patient presents for a  "follow-up visit to discuss her mental health status and medication management, last seen on March 20th.    HPI:  Patient reports significant improvement in her mental health since her last visit, stating she is "doing great" and that her previous issues were "very situational" and are no longer present. She mentions feeling better and stopping her Abilify medication after taking it for approximately 2-3 weeks. Patient describes improved relationships with her family members, spending time with her nieces, including babysitting and swimming together. She had a conversation with her family to address previous issues, leading to a sense of resolution and normalcy in their interactions.    Patient reports engaging in various activities, including hosting family events, planning trips, and working on art projects. She expresses feeling hopeful about the future and describes her life as "full." Patient mentions gaining about 10 lbs, which she views positively. While she acknowledges being a "worry wart," her current level of anxiety appears to be within normal limits. She scores low on anxiety questionnaires and denies any anxiety attacks or difficulty breathing.    Patient reports sleeping "great" at night, eating 2-3 meals a day, and maintaining good fluid intake. She has been utilizing her backyard and new pool as a relaxation technique, spending time outside daily, even if not swimming, finding it very relaxing. Patient also mentions reading information on "wise mind," which has helped her find balance in her thinking. She denies any current suicidal thoughts, anxiety attacks, difficulty breathing, psychotic symptoms, or delusional thoughts.    MEDICATIONS:  Patient is on Sertraline (Zoloft) 100 mg daily, Vitamin D3 5000 units daily, a daily multi-vitamin, Estradiol, Dupixent, and B12 1000 mcg daily. She takes Xanax as needed but is not currently taking it. Patient also takes Betahistine as needed for sodium-related " issues. She started Abilify 5 mg daily for 2-3 weeks but has discontinued it.    TESTS:  Patient completed a PHQ-9 assessment during the current visit, scoring 0. She also took a IVONNE-7 test, scoring 1, which is considered within the normal range.    ALLERGIES:  Patient is allergic to Gentamicin and Buspar.    LIFESTYLE:  Patient has a history of working as an educator, having taught for many years. She currently lives at home with her , and they recently installed a pool in their backyard in November. Patient enjoys painting and is currently working on a large art order. She spends time in her backyard, sitting by the pool, watering plants, and maintaining the pool. Her backyard also features a swing, patio set, fire pit, and fountain. Patient is hosting a bridal event for about 80 people at her home for her nephew's engagement, planning decorations and preparing food. She mentions an upcoming trip to [place] and frequently uses her pool for social gatherings with family members.          Denies suicidal/homicidal ideations.    Denies symptoms of davina.  Pt denies AH/VH.  + paranoid delusions, IOR.  Paranoia and ideas of reference are attenuating.  No self harm or violence.   Does not drink alcohol.  She denies substance use.     THE [place] PROTOCOL SUICIDE SCREEN  I[]I Patient unable or unwilling to participate.   has firearms - has now secured and  ammunition.     Always ask questions 1 and 2:   I[x]I Y - Low Risk  I[]I N  1) Wish To Be Dead: In the past month, have you wished you were dead or wished you could go to sleep and not wake up?: **   I[x]I Y - Low Risk  I[]I N  2) Non-Specific Active Suicidal Thoughts: In the past month, have you actually had any thoughts about killing yourself?: **    If YES to 2, ask questions 3, 4, 5, 6 and 7:  If NO to 2, skip to question 7:    I[]I Y - Moderate Risk  I[]I N  I[]I N/A  3) Active Suicidal Ideation with Any Methods (Not Plan) without Intent to  Act: In the past month, have you been thinking about how you might do this?        I[]I Y - High Risk          I[]I N  I[]I N/A  4) Active Suicidal Ideation with Some Intent to Act, without Specific Plan: In the past month, have you had these thoughts and had some intention of acting on them?: **  I[]I Y - High Risk          I[]I N  I[]I N/A  5) Active Suicidal Ideation with Specific Plan: In the past month, have you started to work out or already worked out the details of how to kill yourself?: **  I[]I Y - High Risk          I[]I N  I[]I N/A  6) Active Suicidal Ideation with Specific Plan and Intent: DO YOU INTEND TO CARRY OUT THIS PLAN?: **    Always Ask Question 7:   Suicidal Behavior: Have you done anything, started to do anything, or prepared to do anything to end your life...  I[x]I Y - Moderate Risk  I[]I N  Ever in your lifetime?: **  I[x]I Y - High Risk          I[]I N  In the past 3 months?: **    Examples: Collected pills, obtained a gun, gave away valuables, wrote a will or suicide note, took out pills but didn't swallow any, held a gun but changed mind or it was grabbed from hand, went to the roof but didn't jump; or actually took pills, tried to shoot self, cut self, tried to hang self, etc.      NOTE: The use of standardized rating scales and safety contracts can aid in risk assessment, but do not substitute for clinical judgment. My clinical assessment is in agreement with the [place] Protocol Suicide Screen.      5/20/2025     2:47 PM   PHQ-9 Depression Patient Health Questionnaire   Patient agreed to terms: Yes   Little interest or pleasure in doing things 0   Feeling down, depressed, or hopeless 0   Trouble falling or staying asleep, or sleeping too much 0   Feeling tired or having little energy 0   Poor appetite or overeating 0   Feeling bad about yourself - or that you are a failure or have let yourself or your family down 0   Trouble concentrating on things, such as reading the newspaper or  watching television 0   Moving or speaking so slowly that other people could have noticed. Or the opposite - being so fidgety or restless that you have been moving around a lot more than usual 0   Thoughts that you would be better off dead, or of hurting yourself in some way 0   PHQ-9 Total Score 0    If you checked off any problems, how difficult have these problems made it for you to do your work, take care of things at home, or get along with other people? Not difficult at all   Interpretation Minimal or None        Patient-reported         5/20/2025     2:46 PM 3/20/2025     8:51 AM 2/6/2025     6:52 PM   IVONNE-7   1. Feeling nervous, anxious, or on edge? Not at all Not at all Not at all   2. Not being able to stop or control worrying? Not at all Not at all Not at all   3. Worrying too much about different things? Several days Not at all Several days   4. Trouble relaxing? Not at all Not at all Not at all   5. Being so restless that it is hard to sit still? Not at all Not at all Not at all   6. Becoming easily annoyed or irritable? Not at all Not at all Not at all   7. Feeling afraid as if something awful might happen? Not at all Several days Not at all   8. If you checked off any problems, how difficult have these problems made it for you to do your work, take care of things at home, or get along with other people? Not difficult at all Not difficult at all Not difficult at all   IVONNE-7 Score 1  1  1    Number answered (out of first 7) 7  7  7    Interpretation Normal  Normal  Normal        Patient-reported       Medications:   Sertraline 100 mg every other day    Abilify 5 mg daily - no longer taking   Xanax 0.25 mg daily prn anxiety - none taken   Review Of Systems:     GENERAL:  weight gain and now stable 122 lbs (low was 117 lbs)   CARDIOVASCULAR:  No chest pain or recent palpitations   MUSCULOSKELETAL:  No pain or stiffness of the joints  DERM: atopic dermatitis  Neuro: SCD/vertigo   Current Evaluation:  "    Nutritional Screening: Considering the patient's height and weight, medications, medical history and preferences, should a referral be made to the dietitian? no    Constitutional  Vitals:  Most recent vital signs, dated more than 90 days prior to this appointment, were reviewed (not available)     General:  age appropriate, normal weight, neatly groomed, good eye contact      Musculoskeletal  Muscle Strength/Tone:  No tremor observed    Gait & Station:  Seated      Psychiatric  Speech:  no latency; no press   Mood & Affect:  "I am fine"  Full    Thought Process:  Linear with questions    Associations:  No RAMAKRISHNA    Thought Content:  no suicidality, no homicidality, hallucinations: (auditory: no, visual: no), + paranoid ideations    Insight:  Fair   Judgement: Improved   Orientation:  grossly intact, person, place, situation, time/date, day of week, month of year, year   Memory: intact for content of interview, able to remember recent events- no, able to remember remote events- yes   Language: grossly intact   Attention Span & Concentration:  able to focus   Fund of Knowledge:  intact and appropriate to age and level of education, familiar with aspects of current personal life       Functioning in Relationships:  Spouse/partner:  Some conflicts  Peers: limited   Employers:  Retired, now has art business      Medications  Outpatient Encounter Medications as of 5/20/2025   Medication Sig Dispense Refill    ALPRAZolam (XANAX) 0.25 MG tablet Take one-half to one tablet po daily prn anxiety (Patient not taking: Reported on 5/1/2025) 30 tablet 0    ARIPiprazole (ABILIFY) 5 MG Tab Take 1 tablet (5 mg total) by mouth once daily. Dx Code F22 30 tablet 0    ascorbic acid, vitamin C, (VITAMIN C) 500 MG tablet Take 500 mg by mouth once daily.      betahistine HCl (BETAHISTINE, BULK, MISC) by Misc.(Non-Drug; Combo Route) route 2 (two) times a day. Taking as needed - tapered down      cyanocobalamin (VITAMIN B-12) 1000 MCG tablet " "Take 1,000 mcg by mouth once daily.      DUPIXENT  mg/2 mL PnIj Inject 300 mg into the skin every 14 (fourteen) days.      estradioL (ESTRACE) 0.01 % (0.1 mg/gram) vaginal cream Place 1 g vaginally once daily for 14 days, THEN 1 g twice a week. 42.5 g 3    multivit-mins no.63/iron/folic (M-VIT ORAL) Take by mouth once daily. Over 51 yo vitamin      sertraline (ZOLOFT) 100 MG tablet Take 1 tablet (100 mg total) by mouth every evening. 90 tablet 1    vitamin D (VITAMIN D3) 1000 units Tab Take 5,000 Units by mouth once daily.       No facility-administered encounter medications on file as of 5/20/2025.         Assessment - Diagnosis - Goals:   Delusional Disorder   MDD, single episode, IN PARTIAL REMISSION   Generalized Anxiety Disorder  Superior Canal Dehiscence, Psoriasis    GAF: 50    Assessment & Plan    F32.0 Major depressive disorder, single episode, mild  F41.1 Generalized anxiety disorder  Z88.1 Allergy status to other antibiotic agents  Z88.8 Allergy status to other drugs, medicaments and biological substances    IMPRESSION:   Significant improvement in mental health symptoms since last visit in March.   Discontinued Abilify as patient reports feeling better without it and has not been taking it for several weeks. Explained role of Abilify in managing delusional thoughts and when it becomes necessary.   No current signs of depression, anxiety, or psychotic symptoms.   Demonstrates good insight into previous episode and has strategies to manage stress.   Family relationships have improved, engaging in normal activities.   Weight gain of 10 lbs noted as positive sign of improved health.   Current worry levels assessed as normal and not causing impairment.    PLAN SUMMARY:   Continue Zoloft (sertraline) 100 mg daily as primary antidepressant treatment   Utilize "wise mind" concept for balancing emotional and rational thinking   Practice recognition and addressing of stress before it becomes overwhelming   " "Maintain current social interactions and family relationships   Continue using backyard and pool area for relaxation and family gatherings   Contact office if concerning symptoms arise before next appointment   Follow up in 3 months to ensure sustained improvement    MAJOR DEPRESSIVE DISORDER:   Continued Zoloft (sertraline) 100 mg daily as primary antidepressant treatment.   Educated on recognizing warning signs of increased stress and importance of reaching out for support.   Discussed concept of "wise mind" for balancing emotional and rational thinking.   Discussed that intrusive thoughts can be a sign of stress rather than reality.   Patient to utilize "wise mind" concept when dealing with challenging thoughts or emotions.   Recommend practicing recognition and addressing of stress before it becomes overwhelming.    GENERALIZED ANXIETY DISORDER:   Patient to continue using the backyard and pool area for relaxation and family gatherings.   Patient to maintain current social interactions and family relationships.    FOLLOW-UP:   Follow up in 3 months to ensure sustained improvement.   Contact the office if any concerning symptoms arise before the next scheduled appointment.        - Rare use of Xanax 0.25 mg (one-half to one tab) daily p.r.n. anxiety - primarily for air travel.  Discussed risk of decreased RT, sedation, addictive potential, and not to mix with alcohol.  [place] prescription monitoring program reviewed.  - Pt instructed to go to ER with thoughts of harming self, others   - Call to report any worsening of symptoms or problems with the medication  - Psychotherapy and IOP has been recommended; she has declined both   - Visit today included increased complexity associated with the care of the episodic problem delusional disorder, MDD addressed and managing the longitudinal care of the patient due to the serious and/or complex managed problem(s).    -Spent 39 min face to face with the pt; >50% time " spent in counseling   -Supportive therapy and psychoeducation provided  -R/B/SE's of medications discussed with the pt who expresses understanding and chooses to take medications as prescribed.   -Pt instructed to call clinic, 911 or go to nearest emergency room if sxs worsen or pt is in   crisis. The pt expresses understanding.      Return to Clinic: 3 MONTHS

## 2025-05-23 ENCOUNTER — PATIENT MESSAGE (OUTPATIENT)
Dept: OBSTETRICS AND GYNECOLOGY | Facility: CLINIC | Age: 55
End: 2025-05-23
Payer: COMMERCIAL

## 2025-05-23 DIAGNOSIS — N64.4 BREAST PAIN: Primary | ICD-10-CM

## 2025-05-26 ENCOUNTER — PATIENT MESSAGE (OUTPATIENT)
Dept: PSYCHIATRY | Facility: CLINIC | Age: 55
End: 2025-05-26
Payer: COMMERCIAL

## 2025-05-26 ENCOUNTER — TELEPHONE (OUTPATIENT)
Dept: PSYCHIATRY | Facility: CLINIC | Age: 55
End: 2025-05-26
Payer: COMMERCIAL

## 2025-05-26 NOTE — TELEPHONE ENCOUNTER
Called pt and left message to call office to get follow up scheduled. Also sending my chart portal message asking the same  Also going to put a recall for 3 month visit.

## 2025-05-26 NOTE — TELEPHONE ENCOUNTER
----- Message from Stephanie Mcpherson MD sent at 5/25/2025 11:48 PM CDT -----  3 MONTHS, IN PERSON PLEASE

## 2025-05-27 NOTE — TELEPHONE ENCOUNTER
Called patient. Discussed with her it is common to do breast exams in multiple positions to get the best exam and if we don't feel a mass or problem that we don't necessarily need to do more imaging. She is average risk for breast cancer on her T-C score and as of now there is not concise recommendations on doing further screening for dense breast with average risk. She is concerned because since our last exam she started having more pain in the breast. Discussed common causes of breast pain but since is a change will proceed with diagnostic mmg and Us. Discussed small risks with multiple mammograms and radiation and that this test is only indicated for masses/pains concerns for the breast and isn't a screening test that would be done every year. All questions answered. She will schedule it on the portal or let us know if there are any concerns. If breast pain is persistent even if imaging is normal the will reach out to reschedule appointments.

## 2025-06-24 ENCOUNTER — PATIENT MESSAGE (OUTPATIENT)
Dept: OBSTETRICS AND GYNECOLOGY | Facility: CLINIC | Age: 55
End: 2025-06-24
Payer: COMMERCIAL